# Patient Record
Sex: FEMALE | Race: WHITE
[De-identification: names, ages, dates, MRNs, and addresses within clinical notes are randomized per-mention and may not be internally consistent; named-entity substitution may affect disease eponyms.]

---

## 2017-07-17 ENCOUNTER — HOSPITAL ENCOUNTER (EMERGENCY)
Dept: HOSPITAL 56 - MW.ED | Age: 82
Discharge: HOME | End: 2017-07-17
Payer: MEDICARE

## 2017-07-17 VITALS — SYSTOLIC BLOOD PRESSURE: 146 MMHG | DIASTOLIC BLOOD PRESSURE: 74 MMHG

## 2017-07-17 DIAGNOSIS — Z90.710: ICD-10-CM

## 2017-07-17 DIAGNOSIS — Z88.2: ICD-10-CM

## 2017-07-17 DIAGNOSIS — Z96.659: ICD-10-CM

## 2017-07-17 DIAGNOSIS — K21.9: ICD-10-CM

## 2017-07-17 DIAGNOSIS — Z91.040: ICD-10-CM

## 2017-07-17 DIAGNOSIS — E03.9: ICD-10-CM

## 2017-07-17 DIAGNOSIS — L03.115: Primary | ICD-10-CM

## 2017-07-17 DIAGNOSIS — Z88.5: ICD-10-CM

## 2017-07-17 DIAGNOSIS — Z98.49: ICD-10-CM

## 2017-07-17 DIAGNOSIS — Z88.0: ICD-10-CM

## 2017-07-17 DIAGNOSIS — Z87.891: ICD-10-CM

## 2017-07-17 DIAGNOSIS — Z88.6: ICD-10-CM

## 2017-07-17 DIAGNOSIS — Z79.899: ICD-10-CM

## 2017-07-17 LAB
CHLORIDE SERPL-SCNC: 111 MMOL/L (ref 98–110)
SODIUM SERPL-SCNC: 140 MMOL/L (ref 136–146)

## 2017-07-17 PROCEDURE — 86140 C-REACTIVE PROTEIN: CPT

## 2017-07-17 PROCEDURE — 85025 COMPLETE CBC W/AUTO DIFF WBC: CPT

## 2017-07-17 PROCEDURE — 96375 TX/PRO/DX INJ NEW DRUG ADDON: CPT

## 2017-07-17 PROCEDURE — 36415 COLL VENOUS BLD VENIPUNCTURE: CPT

## 2017-07-17 PROCEDURE — 80053 COMPREHEN METABOLIC PANEL: CPT

## 2017-07-17 PROCEDURE — 87040 BLOOD CULTURE FOR BACTERIA: CPT

## 2017-07-17 PROCEDURE — 96365 THER/PROPH/DIAG IV INF INIT: CPT

## 2017-07-17 PROCEDURE — 73562 X-RAY EXAM OF KNEE 3: CPT

## 2017-07-17 PROCEDURE — 99284 EMERGENCY DEPT VISIT MOD MDM: CPT

## 2017-07-17 PROCEDURE — 85652 RBC SED RATE AUTOMATED: CPT

## 2017-07-17 NOTE — EDM.PDOC
ED HPI GENERAL MEDICAL PROBLEM





- General


Chief Complaint: Lower Extremity Injury/Pain


Stated Complaint: RIGHT KNEE PAIN


Time Seen by Provider: 07/17/17 06:59


Source of Information: Reports: Patient





- History of Present Illness


INITIAL COMMENTS - FREE TEXT/NARRATIVE: 


History of present illness:


[]Patient has a knee replacement on her right knee and started having 

increasing pain and redness and swelling 3 days ago. She states she has chills 

but denies any fevers or recent trauma to the knee. Patient did have a wound 

infection after the knee replacement and has been going to wound care at [facility].





Review of systems: 


As per history of present illness and below otherwise all systems reviewed and 

negative.





Past medical history: 


As per history of present illness and as reviewed below otherwise 

noncontributory.





Surgical history: 


As per history of present illness and as reviewed below otherwise 

noncontributory.





Social history: 


No reported history of drug or alcohol abuse.





Family history: 


As per history of present illness and as reviewed below otherwise 

noncontributory.





Physical exam:


General: Well developed, well nourished in NAD


HEENT: Atraumatic, normocephalic, pupils reactive, negative for conjunctival 

pallor or scleral icterus, mucous membranes moist, throat clear, neck supple, 

nontender, trachea midline.


Lungs: Clear to auscultation, breath sounds equal bilaterally, chest nontender.


Heart: S1S2, regular, negative for clicks, rubs, or JVD.


Abdomen: Soft, nondistended, nontender. Negative for masses or 

hepatosplenomegaly. Negative for costovertebral tenderness.


Pelvis: Stable nontender.


Genitourinary: Deferred.


Rectal: Deferred.


Extremities: Atraumatic, right lateral knee with erythema, warmth and 

tenderness to palpation there is no wound drainage from chronic wound on the 

lower medial knee, no effusion, no distal edema negative for cords or calf 

pain. Neurovascular unremarkable.


Neuro: Awake, alert, oriented. Cranial nerves II through XII unremarkable. 

Cerebellum unremarkable. Motor and sensory unremarkable throughout. Exam 

nonfocal.





Diagnostics:


[]X-ray negative, CBC with elevated white count with a shift





Therapeutics:


[]Pain meds and IV antibiotics started





Impression: 


[]Cellulitis over a prosthetic knee





Plan:


[]I consulted Dr. Pope who sent her PA for evaluation. Follow-up with 

orthopedics, Levaquin daily for 10 days, Toradol and Zofran for pain and nausea 

as needed.





Definitive disposition and diagnosis as appropriate pending reevaluation and 

review of above.





  ** Right Knee


Pain Score (Numeric/FACES): 10





- Related Data


 Allergies











Allergy/AdvReac Type Severity Reaction Status Date / Time


 


codeine Allergy  Vomiting Verified 08/27/16 13:55


 


latex Allergy  Rash Verified 08/29/16 11:51


 


morphine Allergy  Difficulty Verified 08/27/16 13:55





   Breathing  


 


oxycodone HCl [From Percocet] Allergy  Vomiting Verified 08/27/16 13:55


 


Penicillins Allergy  Anaphylactic Verified 08/27/16 13:55





   Shock  


 


Sulfa (Sulfonamide Allergy  Anaphylactic Verified 08/27/16 13:55





Antibiotics)   Shock  


 


all pain meds Allergy  Nausea Uncoded 07/17/17 07:03











Home Meds: 


 Home Meds





amLODIPine Besylate [Amlodipine Besylate] 10 mg PO DAILY 12/26/14 [History]


Omeprazole Magnesium [Prilosec Otc] 20 mg PO ACBRK 12/30/14 [History]


Promethazine [Phenergan] 25 mg PO Q6H PRN #30 tablet 01/02/15 [Rx]


Denosumab [Prolia] 1 dose IM ASDIRECTED 07/28/16 [History]


Levothyroxine [Synthroid] 1 tab PO DAILY 07/28/16 [History]


Ketorolac [Toradol] 10 mg PO Q6H PRN #20 tablet 07/17/17 [Rx]


Levofloxacin [Levaquin] 750 mg PO DAILY #9 tablet 07/17/17 [Rx]


Ondansetron HCl [Zofran] 4 mg PO Q8HR PRN #16 tablet 07/17/17 [Rx]











Past Medical History





- Past Health History


Medical/Surgical History: Denies Medical/Surgical History


HEENT History: Reports: Cataract


Other HEENT History: has upper denture and lower partial


Cardiovascular History: Reports: None


Respiratory History: Reports: None


Gastrointestinal History: Reports: GERD, Irritable Bowel Syndrome, Other (See 

Below)


Other Gastrointestinal History: IBS-Diarrhea


Genitourinary History: Reports: Other (See Below)


Other Genitourinary History: overactive bladder


OB/GYN History: Reports: Pregnancy


Musculoskeletal History: Reports: Arthritis


Neurological History: Reports: Other (See Below)


Other Neuro History: hx of motion sickness


Psychiatric History: Reports: None


Endocrine/Metabolic History: Reports: Hypothyroidism


Hematologic History: Reports: None


Immunologic History: Reports: Other (See Below)


Other Immunologic History: CREST syndrome


Oncologic (Cancer) History: Reports: None


Dermatologic History: Reports: None, Other (See Below)


Other Dermatologic History: Raynaud's Syndrome





- Past Surgical History


HEENT Surgical History: Reports: Cataract Surgery


Female  Surgical History: Reports: Hysterectomy, Other (See Below)


Musculoskeletal Surgical History: Reports: Knee Replacement, ORIF, Shoulder 

Surgery, Other (See Below)





Social & Family History





- Family History


Family Medical History: Noncontributory


HEENT: Reports: Cataract


Respiratory: Reports: COPD


Musculoskeletal: Reports: Arthritis


Psychiatric: Reports: Depression


Endocrine/Metabolic: Reports: Diabetes, type II





- Tobacco Use


Smoking Status *Q: Former Smoker


Years of Tobacco use: 10


Used Tobacco, but Quit: No


Second Hand Smoke Exposure: No





- Alcohol Use


Days Per Week of Alcohol Use: 0


Number of Drinks Per Day: 0


Total Drinks Per Week: 0





- Recreational Drug Use


Recreational Drug Use: No


Drug Use in Last 12 Months: No





Review of Systems





- Review of Systems


Review Of Systems: See Below (See history of present illness)





ED EXAM, GENERAL





- Physical Exam


Exam: See Below (See history of present illness)





Course





- Vital Signs


Last Recorded V/S: 


 Last Vital Signs











Temp  37.1 C   07/17/17 07:03


 


Pulse  88   07/17/17 10:30


 


Resp  16   07/17/17 10:30


 


BP  122/54 L  07/17/17 10:30


 


Pulse Ox  90 L  07/17/17 10:30














- Orders/Labs/Meds


Orders: 


 Active Orders 24 hr











 Category Date Time Status


 


 CULTURE BLOOD [BC] Stat Lab  07/17/17 07:25 Received


 


 CULTURE BLOOD [BC] Stat Lab  07/17/17 07:39 Received


 


 Sodium Chloride 0.9% [Saline Flush] Med  07/17/17 07:14 Active





 10 ml FLUSH ASDIRECTED PRN   


 


 Sodium Chloride 0.9% [Saline Flush] Med  07/17/17 07:14 Active





 2.5 ml FLUSH ASDIRECTED PRN   


 


 Blood Culture x2 Reflex Set [OM.PC] Stat Oth  07/17/17 07:13 Ordered


 


 Saline Lock Insert [OM.PC] Stat Oth  07/17/17 07:14 Ordered








 Medication Orders





Sodium Chloride (Saline Flush)  10 ml FLUSH ASDIRECTED PRN


   PRN Reason: Keep Vein Open


Sodium Chloride (Saline Flush)  2.5 ml FLUSH ASDIRECTED PRN


   PRN Reason: Keep Vein Open








Labs: 


 Laboratory Tests











  07/17/17 07/17/17 07/17/17 Range/Units





  07:25 07:25 07:25 


 


WBC  11.54 H    (4.0-11.0)  K/uL


 


RBC  4.03 L    (4.30-5.90)  M/uL


 


Hgb  11.5 L    (12.0-16.0)  g/dL


 


Hct  36.6    (36.0-46.0)  %


 


MCV  90.8    (80.0-98.0)  fL


 


MCH  28.5    (27.0-32.0)  pg


 


MCHC  31.4    (31.0-37.0)  g/dL


 


RDW Std Deviation  50.3    (28.0-62.0)  fl


 


RDW Coeff of Rai  15    (11.0-15.0)  %


 


Plt Count  189    (150-400)  K/uL


 


MPV  10.80    (7.40-12.00)  fL


 


Neut % (Auto)  83.2 H    (48.0-80.0)  %


 


Lymph % (Auto)  9.3 L    (16.0-40.0)  %


 


Mono % (Auto)  7.0    (0.0-15.0)  %


 


Eos % (Auto)  0.3    (0.0-7.0)  %


 


Baso % (Auto)  0.2    (0.0-1.5)  %


 


Neut # (Auto)  9.6 H    (1.4-5.7)  K/uL


 


Lymph # (Auto)  1.1    (0.6-2.4)  K/uL


 


Mono # (Auto)  0.8    (0.0-0.8)  K/uL


 


Eos # (Auto)  0.0    (0.0-0.7)  K/uL


 


Baso # (Auto)  0.0    (0.0-0.1)  K/uL


 


Nucleated RBC %  0.0    /100WBC


 


Nucleated RBCs #  0    K/uL


 


ESR    44 H  (0-29)  mm/hr


 


Sodium   140   (136-146)  mmol/L


 


Potassium   3.7   (3.5-5.1)  mmol/L


 


Chloride   111 H   ()  mmol/L


 


Carbon Dioxide   23   (21-31)  mmol/L


 


BUN   13   (6.0-23.0)  mg/dL


 


Creatinine   0.8   (0.6-1.5)  mg/dL


 


Est Cr Clr Drug Dosing   44.36   mL/min


 


Estimated GFR (MDRD)   > 60.0   ml/min


 


Glucose   101   ()  mg/dL


 


Calcium   8.5 L   (8.8-10.8)  mg/dL


 


Total Bilirubin   0.5   (0.1-1.5)  mg/dL


 


AST   13   (5-40)  IU/L


 


ALT   8   (8-54)  IU/L


 


Alkaline Phosphatase   84   ()  


 


C-Reactive Protein     (0.0-0.5)  mg/dL


 


Total Protein   7.1   (6.0-8.0)  g/dL


 


Albumin   3.6   (3.4-4.8)  g/dL


 


Globulin   3.5   (2.0-3.5)  g/dL


 


Albumin/Globulin Ratio   1.0 L   (1.3-2.8)  














  07/17/17 Range/Units





  07:25 


 


WBC   (4.0-11.0)  K/uL


 


RBC   (4.30-5.90)  M/uL


 


Hgb   (12.0-16.0)  g/dL


 


Hct   (36.0-46.0)  %


 


MCV   (80.0-98.0)  fL


 


MCH   (27.0-32.0)  pg


 


MCHC   (31.0-37.0)  g/dL


 


RDW Std Deviation   (28.0-62.0)  fl


 


RDW Coeff of Rai   (11.0-15.0)  %


 


Plt Count   (150-400)  K/uL


 


MPV   (7.40-12.00)  fL


 


Neut % (Auto)   (48.0-80.0)  %


 


Lymph % (Auto)   (16.0-40.0)  %


 


Mono % (Auto)   (0.0-15.0)  %


 


Eos % (Auto)   (0.0-7.0)  %


 


Baso % (Auto)   (0.0-1.5)  %


 


Neut # (Auto)   (1.4-5.7)  K/uL


 


Lymph # (Auto)   (0.6-2.4)  K/uL


 


Mono # (Auto)   (0.0-0.8)  K/uL


 


Eos # (Auto)   (0.0-0.7)  K/uL


 


Baso # (Auto)   (0.0-0.1)  K/uL


 


Nucleated RBC %   /100WBC


 


Nucleated RBCs #   K/uL


 


ESR   (0-29)  mm/hr


 


Sodium   (136-146)  mmol/L


 


Potassium   (3.5-5.1)  mmol/L


 


Chloride   ()  mmol/L


 


Carbon Dioxide   (21-31)  mmol/L


 


BUN   (6.0-23.0)  mg/dL


 


Creatinine   (0.6-1.5)  mg/dL


 


Est Cr Clr Drug Dosing   mL/min


 


Estimated GFR (MDRD)   ml/min


 


Glucose   ()  mg/dL


 


Calcium   (8.8-10.8)  mg/dL


 


Total Bilirubin   (0.1-1.5)  mg/dL


 


AST   (5-40)  IU/L


 


ALT   (8-54)  IU/L


 


Alkaline Phosphatase   ()  


 


C-Reactive Protein  18.89 H  (0.0-0.5)  mg/dL


 


Total Protein   (6.0-8.0)  g/dL


 


Albumin   (3.4-4.8)  g/dL


 


Globulin   (2.0-3.5)  g/dL


 


Albumin/Globulin Ratio   (1.3-2.8)  











Meds: 


Medications











Generic Name Dose Route Start Last Admin





  Trade Name Freq  PRN Reason Stop Dose Admin


 


Sodium Chloride  10 ml  07/17/17 07:14  





  Saline Flush  FLUSH   





  ASDIRECTED PRN   





  Keep Vein Open   


 


Sodium Chloride  2.5 ml  07/17/17 07:14  





  Saline Flush  FLUSH   





  ASDIRECTED PRN   





  Keep Vein Open   














Discontinued Medications














Generic Name Dose Route Start Last Admin





  Trade Name Freq  PRN Reason Stop Dose Admin


 


Levofloxacin/Dextrose 750 mg/  150 mls @ 100 mls/hr  07/17/17 10:02  07/17/17 10

:36





  Premix  IV  07/17/17 11:31  100 mls/hr





  ONETIME ONE   Administration


 


Ketorolac Tromethamine  15 mg  07/17/17 08:02  07/17/17 08:09





  Toradol  IVPUSH  07/17/17 08:03  15 mg





  ONETIME ONE   Administration


 


Ondansetron HCl  4 mg  07/17/17 08:03  07/17/17 08:09





  Zofran  IVPUSH  07/17/17 08:04  4 mg





  ONETIME ONE   Administration














Departure





- Departure


Time of Disposition: 12:10


Disposition: Home, Self-Care 01


Condition: Good


Clinical Impression: 


 Cellulitis of knee, right








- Discharge Information


Prescriptions: 


Ondansetron HCl [Zofran] 4 mg PO Q8HR PRN #16 tablet


 PRN Reason: Nausea


Ketorolac [Toradol] 10 mg PO Q6H PRN #20 tablet


 PRN Reason: Pain


Levofloxacin [Levaquin] 750 mg PO DAILY #9 tablet


Instructions:  Cellulitis, Adult, Easy-to-Read


Referrals: 


PCP,None [Primary Care Provider] - 


Forms:  ED Department Discharge


Additional Instructions: 


The following information is given to patients seen in the emergency department 

who are being discharged to home. This information is to outline your options 

for follow-up care. We provide all patients seen in our emergency department 

with a follow-up referral.





The need for follow-up, as well as the timing and circumstances, are variable 

depending upon the specifics of your emergency department visit.





If you don't have a primary care physician on staff, we will provide you with a 

referral. We always advise you to contact your personal physician following an 

emergency department visit to inform them of the circumstance of the visit and 

for follow-up with them and/or the need for any referrals to a consulting 

specialist.





The emergency department will also refer you to a specialist when appropriate. 

This referral assures that you have the opportunity for follow-up care with a 

specialist. All of these measure are taken in an effort to provide you with 

optimal care, which includes your follow-up.





Under all circumstances we always encourage you to contact your private 

physician who remains a resource for coordinating your care. When calling for 

follow-up care, please make the office aware that this follow-up is from your 

recent emergency room visit. If for any reason you are refused follow-up, 

please contact the [facility] Emergency 

Department at [phone] and asked to speak to the emergency department 

charge nurse.








Levaquin daily for 10 days, tramadol and Zofran for pain and nausea





Follow-up with Dr. Niko [facility]


Specialty [facility] - [facility]


[address], [address]


[place] [address]


Phone: [phone]


Fax: [phone]





- My Orders


Last 24 Hours: 


My Active Orders





07/17/17 07:13


Blood Culture x2 Reflex Set [OM.PC] Stat 





07/17/17 07:14


Sodium Chloride 0.9% [Saline Flush]   10 ml FLUSH ASDIRECTED PRN 


Sodium Chloride 0.9% [Saline Flush]   2.5 ml FLUSH ASDIRECTED PRN 


Saline Lock Insert [OM.PC] Stat 





07/17/17 07:25


CULTURE BLOOD [BC] Stat 





07/17/17 07:39


CULTURE BLOOD [BC] Stat 














- Assessment/Plan


Last 24 Hours: 


My Active Orders





07/17/17 07:13


Blood Culture x2 Reflex Set [OM.PC] Stat 





07/17/17 07:14


Sodium Chloride 0.9% [Saline Flush]   10 ml FLUSH ASDIRECTED PRN 


Sodium Chloride 0.9% [Saline Flush]   2.5 ml FLUSH ASDIRECTED PRN 


Saline Lock Insert [OM.PC] Stat 





07/17/17 07:25


CULTURE BLOOD [BC] Stat 





07/17/17 07:39


CULTURE BLOOD [BC] Stat

## 2017-07-17 NOTE — CR
EXAMINATION: Right knee

 

HISTORY: Pain

 

COMPARISON: CT dated 8/30/2016

 

TECHNIQUE: 3 views

 

FINDINGS: There is no acute osseous abnormality, dislocation, or fracture identified. Right revision
 arthroplasty hardware noted in stable position and alignment. Postoperative soft tissue changes not
ed within the right knee without evidence of a joint effusion. Bone mineralization is otherwise norm
al.

 

IMPRESSION: Right total knee hardware without an acute osseous abnormality.

## 2017-07-17 NOTE — PCM.CONS
<Mando Marmolejo - Last Filed: 07/17/17 10:08>





H&P History of Present Illness





- General


Date of Service: 07/17/17


Source of Information: Patient, Family


History Limitations: Reports: No Limitations





- History of Present Illness


Onset of Symptoms: Reports: Gradual


Duration of Symptoms: Reports: Day(s): (4)


Location: Reports: Lower Extremity, Right


Quality: Reports: Sharp, Stabbing, Throbbing


Improves with: Reports: Medication (Toradol)


Worsens with: Reports: Movement


Associated Symptoms: Reports: No Other Symptoms.  Denies: Fever/Chills


  ** Right Knee


Pain Score (Numeric/FACES): 10





- Related Data


Allergies/Adverse Reactions: 


 Allergies











Allergy/AdvReac Type Severity Reaction Status Date / Time


 


codeine Allergy  Vomiting Verified 08/27/16 13:55


 


latex Allergy  Rash Verified 08/29/16 11:51


 


morphine Allergy  Difficulty Verified 08/27/16 13:55





   Breathing  


 


oxycodone HCl [From Percocet] Allergy  Vomiting Verified 08/27/16 13:55


 


Penicillins Allergy  Anaphylactic Verified 08/27/16 13:55





   Shock  


 


Sulfa (Sulfonamide Allergy  Anaphylactic Verified 08/27/16 13:55





Antibiotics)   Shock  


 


all pain meds Allergy  Nausea Uncoded 07/17/17 07:03











Home Medications: 


 Home Meds





amLODIPine Besylate [Amlodipine Besylate] 10 mg PO DAILY 12/26/14 [History]


Omeprazole Magnesium [Prilosec Otc] 20 mg PO ACBRK 12/30/14 [History]


Promethazine [Phenergan] 25 mg PO Q6H PRN #30 tablet 01/02/15 [Rx]


Denosumab [Prolia] 1 dose IM ASDIRECTED 07/28/16 [History]


Levothyroxine [Synthroid] 1 tab PO DAILY 07/28/16 [History]


Ketorolac [Toradol] 10 mg PO Q6H PRN #20 tablet 07/17/17 [Rx]


Levofloxacin [Levaquin] 750 mg PO DAILY #9 tablet 07/17/17 [Rx]


Ondansetron HCl [Zofran] 4 mg PO Q8HR PRN #16 tablet 07/17/17 [Rx]











Past Medical History





- Past Health History


Medical/Surgical History: Denies Medical/Surgical History


HEENT History: Reports: Cataract


Other HEENT History: has upper denture and lower partial


Cardiovascular History: Reports: None


Respiratory History: Reports: None


Gastrointestinal History: Reports: GERD, Irritable Bowel Syndrome, Other (See 

Below)


Other Gastrointestinal History: IBS-Diarrhea


Genitourinary History: Reports: Other (See Below)


Other Genitourinary History: overactive bladder


OB/GYN History: Reports: Pregnancy


Musculoskeletal History: Reports: Arthritis


Neurological History: Reports: Other (See Below)


Other Neuro History: hx of motion sickness


Psychiatric History: Reports: None


Endocrine/Metabolic History: Reports: Hypothyroidism


Hematologic History: Reports: None


Immunologic History: Reports: Other (See Below)


Other Immunologic History: CREST syndrome


Oncologic (Cancer) History: Reports: None


Dermatologic History: Reports: None, Other (See Below)


Other Dermatologic History: Raynaud's Syndrome





- Infectious Disease History


Infectious Disease History: Reports: Chicken Pox, Measles, Shingles





- Past Surgical History


HEENT Surgical History: Reports: Cataract Surgery


Female  Surgical History: Reports: Hysterectomy, Other (See Below)


Musculoskeletal Surgical History: Reports: Knee Replacement, ORIF, Shoulder 

Surgery, Other (See Below)





Social & Family History





- Family History


Family Medical History: Noncontributory


HEENT: Reports: Cataract


Respiratory: Reports: COPD


Musculoskeletal: Reports: Arthritis


Psychiatric: Reports: Depression


Endocrine/Metabolic: Reports: Diabetes, type II





- Tobacco Use


Smoking Status *Q: Former Smoker


Years of Tobacco use: 10


Used Tobacco, but Quit: No


Second Hand Smoke Exposure: No





- Caffeine Use


Caffeine Use: Reports: Coffee, Soda





- Alcohol Use


Days Per Week of Alcohol Use: 0


Number of Drinks Per Day: 0


Total Drinks Per Week: 0





- Recreational Drug Use


Recreational Drug Use: No


Drug Use in Last 12 Months: No





H&P Review of Systems





- Review of Systems:


Review Of Systems: See Below


General: Denies: Fever, Chills, Malaise, Weakness, Fatigue


HEENT: Reports: No Symptoms


Pulmonary: Reports: No Symptoms


Cardiovascular: Reports: No Symptoms


Gastrointestinal: Reports: No Symptoms


Genitourinary: Reports: No Symptoms


Musculoskeletal: Reports: Leg Pain, Joint Pain, Joint Swelling


Skin: Reports: Erythema (Medial aspect of right knee.)


Psychiatric: Reports: No Symptoms


Neurological: Reports: No Symptoms


Hematologic/Lymphatic: Reports: No Symptoms


Immunologic: Reports: No Symptoms





Exam





- Exam


Exam: See Below





- Vital Signs


Vital Signs: 


 Last Vital Signs











Temp  37.1 C   07/17/17 07:03


 


Pulse  110 H  07/17/17 07:03


 


Resp  18   07/17/17 07:03


 


BP  158/76 H  07/17/17 07:03


 


Pulse Ox  97   07/17/17 07:03











Weight: 50.349 kg





- Exam


General: Alert, Oriented


HEENT: Conjunctiva Clear, Hearing Intact, Pupils Equal, Pupils Reactive


Neck: Trachea Midline


Lungs: Normal Respiratory Effort


Extremities: Normal Pulses, Other (Exam of right knee reveals no obvious 

deformities. Midline incision is clean, dry and well healed. There is an area 

approximately 3cm in diamater on the inferior medial aspect of the knee. This 

appears to be healing. It is dry, no drainage. There is erythema surrounding 

this. Joint is warm with palpation. Pain with palpation of lateral joint line. 

ROM 0-90 degrees. Stable with varus and valgus stress. EHL, gastroc, anterior 

tibialis strength 5/5. ).  No: Calf Tenderness


Skin: Warm, Dry, Intact


Neuro Extensive - Mental Status: Alert, Oriented x3, Normal Mood/Affect, Memory 

Intact


Neuro Extensive - Motor, Sensory, Reflexes: CN II-XII Intact





- Patient Data


Lab Results Last 24 hrs: 


 Laboratory Results - last 24 hr











  07/17/17 07/17/17 Range/Units





  07:25 07:25 


 


WBC  11.54 H   (4.0-11.0)  K/uL


 


RBC  4.03 L   (4.30-5.90)  M/uL


 


Hgb  11.5 L   (12.0-16.0)  g/dL


 


Hct  36.6   (36.0-46.0)  %


 


MCV  90.8   (80.0-98.0)  fL


 


MCH  28.5   (27.0-32.0)  pg


 


MCHC  31.4   (31.0-37.0)  g/dL


 


RDW Std Deviation  50.3   (28.0-62.0)  fl


 


RDW Coeff of Rai  15   (11.0-15.0)  %


 


Plt Count  189   (150-400)  K/uL


 


MPV  10.80   (7.40-12.00)  fL


 


Neut % (Auto)  83.2 H   (48.0-80.0)  %


 


Lymph % (Auto)  9.3 L   (16.0-40.0)  %


 


Mono % (Auto)  7.0   (0.0-15.0)  %


 


Eos % (Auto)  0.3   (0.0-7.0)  %


 


Baso % (Auto)  0.2   (0.0-1.5)  %


 


Neut # (Auto)  9.6 H   (1.4-5.7)  K/uL


 


Lymph # (Auto)  1.1   (0.6-2.4)  K/uL


 


Mono # (Auto)  0.8   (0.0-0.8)  K/uL


 


Eos # (Auto)  0.0   (0.0-0.7)  K/uL


 


Baso # (Auto)  0.0   (0.0-0.1)  K/uL


 


Nucleated RBC %  0.0   /100WBC


 


Nucleated RBCs #  0   K/uL


 


Sodium   140  (136-146)  mmol/L


 


Potassium   3.7  (3.5-5.1)  mmol/L


 


Chloride   111 H  ()  mmol/L


 


Carbon Dioxide   23  (21-31)  mmol/L


 


BUN   13  (6.0-23.0)  mg/dL


 


Creatinine   0.8  (0.6-1.5)  mg/dL


 


Est Cr Clr Drug Dosing   44.36  mL/min


 


Estimated GFR (MDRD)   > 60.0  ml/min


 


Glucose   101  ()  mg/dL


 


Calcium   8.5 L  (8.8-10.8)  mg/dL


 


Total Bilirubin   0.5  (0.1-1.5)  mg/dL


 


AST   13  (5-40)  IU/L


 


ALT   8  (8-54)  IU/L


 


Alkaline Phosphatase   84  ()  


 


Total Protein   7.1  (6.0-8.0)  g/dL


 


Albumin   3.6  (3.4-4.8)  g/dL


 


Globulin   3.5  (2.0-3.5)  g/dL


 


Albumin/Globulin Ratio   1.0 L  (1.3-2.8)  











Result Diagrams: 


 07/17/17 07:25





 07/17/17 07:25





Consult PN Assessment/Plan


Procedures: 


Procedures





BLOOD CULTURE FOR BACTERIA (08/27/16)


BLOOD TYPING SEROLOGIC ABO (12/29/14)


BLOOD TYPING SEROLOGIC RH(D) (12/29/14)


C-REACTIVE PROTEIN (08/29/16)


COMPATIBILITY TEST ANTIGLOB (12/29/14)


COMPATIBILITY TEST INCUBATE (12/29/14)


COMPATIBILITY TEST SPIN (12/29/14)


COMPLETE CBC AUTOMATED (04/29/16)


COMPLETE CBC W/AUTO DIFF WBC (08/29/16)


COMPREHEN METABOLIC PANEL (08/27/16)


CT LOWER EXTREMITY W/DYE (03/29/17)


CT LOWER EXTREMITY W/O DYE (10/14/14)


CULTR BACTERIA EXCEPT BLOOD (08/27/16)


CULTURE OTHR SPECIMN AEROBIC (08/27/16)


MAGGIE MUSC/FASCIA 20 SQ CM/< (07/29/16)


MAGGIE SUBQ TISSUE 20 SQ CM/< (04/29/16)


DECALCIFY TISSUE (12/29/14)


ELECTROCARDIOGRAM TRACING (04/29/16)


EMERGENCY DEPT VISIT (08/27/16)


EXTREMITY STUDY (08/31/16)


FLUOROSCOPE EXAMINATION (11/21/14)


GAIT TRAINING THERAPY (03/27/15)


HEMATOCRIT (12/29/14)


HEMOGLOBIN (12/29/14)


HOT OR COLD PACKS THERAPY (03/27/15)


LEG SURGERY PROCEDURE (12/29/14)


METABOLIC PANEL TOTAL CA (04/29/16)


MRI JNT OF LWR EXTRE W/O DYE (11/22/14)


OFFICE/OUTPATIENT VISIT EST (08/29/16)


OFFICE/OUTPATIENT VISIT EST (03/31/15)


PT EVALUATION (01/30/15)


RBC ANTIBODY SCREEN (12/29/14)


RBC SED RATE AUTOMATED (08/29/16)


REMOVAL OF SUPPORT IMPLANT (11/21/14)


ROUTINE VENIPUNCTURE (08/29/16)


SMEAR GRAM STAIN (07/29/16)


THER/PROPH/DIAG INJ IV PUSH (08/27/16)


THER/PROPH/DIAG INJ SC/IM (08/27/16)


THERAPEUTIC EXERCISES (03/27/15)


TISSUE EXAM BY PATHOLOGIST (12/29/14)


TOTAL KNEE ARTHROPLASTY (12/29/14)


TX/PRO/DX INJ NEW DRUG ADDON (08/27/16)


URINALYSIS AUTO W/SCOPE (12/29/14)


URINE BACTERIA CULTURE (12/29/14)


VASOPNEUMATIC DEVICE THERAPY (02/26/15)


X-RAY EXAM KNEE 4 OR MORE (07/28/16)


X-RAY EXAM OF ANKLE (11/20/14)


X-RAY EXAM OF FOOT (04/07/14)


X-RAY EXAM OF KNEE 1 OR 2 (08/29/16)


X-RAY EXAM OF KNEE 3 (11/24/15)


X-RAY EXAM OF LOWER LEG (08/27/16)








Problem List Initiated/Reviewed/Updated: Yes


Plan: 





ESR and CRP ordered.


D/C home on Levaquin 750 mg for 10 days and Toradol 10 mg for 5 days.


Activity as tolerated. May use walker if needed.


Followup in the clinic on Thursday 07/20 1:30 pm with Mando Marmolejo PA-C.


Questions or concerns prior to followup, call the ortho clinic [phone].











<Mary Carmen Pope R - Last Filed: 07/17/17 12:18>





H&P History of Present Illness





- History of Present Illness


Initial Comments - Free Text/Narative: 





Pt has h/o TKA ~1.5 years ago. Postoperative course complicated by persistent 

wound along medial aspect of knee. She has been followed by wound nurse at 

[facility] for this. Denies new injury. Denies fever/chills. Noted increased pain 

over past few days. Pain improved with Toradol. She has h/o CREST syndrome.





Exam





- Vital Signs


Vital Signs: 


 Last Vital Signs











Temp  98.7 F   07/17/17 07:03


 


Pulse  88   07/17/17 10:30


 


Resp  16   07/17/17 10:30


 


BP  122/54 L  07/17/17 10:30


 


Pulse Ox  90 L  07/17/17 10:30














- Patient Data


Lab Results Last 24 hrs: 


 Laboratory Results - last 24 hr











  07/17/17 07/17/17 07/17/17 Range/Units





  07:25 07:25 07:25 


 


WBC  11.54 H    (4.0-11.0)  K/uL


 


RBC  4.03 L    (4.30-5.90)  M/uL


 


Hgb  11.5 L    (12.0-16.0)  g/dL


 


Hct  36.6    (36.0-46.0)  %


 


MCV  90.8    (80.0-98.0)  fL


 


MCH  28.5    (27.0-32.0)  pg


 


MCHC  31.4    (31.0-37.0)  g/dL


 


RDW Std Deviation  50.3    (28.0-62.0)  fl


 


RDW Coeff of Rai  15    (11.0-15.0)  %


 


Plt Count  189    (150-400)  K/uL


 


MPV  10.80    (7.40-12.00)  fL


 


Neut % (Auto)  83.2 H    (48.0-80.0)  %


 


Lymph % (Auto)  9.3 L    (16.0-40.0)  %


 


Mono % (Auto)  7.0    (0.0-15.0)  %


 


Eos % (Auto)  0.3    (0.0-7.0)  %


 


Baso % (Auto)  0.2    (0.0-1.5)  %


 


Neut # (Auto)  9.6 H    (1.4-5.7)  K/uL


 


Lymph # (Auto)  1.1    (0.6-2.4)  K/uL


 


Mono # (Auto)  0.8    (0.0-0.8)  K/uL


 


Eos # (Auto)  0.0    (0.0-0.7)  K/uL


 


Baso # (Auto)  0.0    (0.0-0.1)  K/uL


 


Nucleated RBC %  0.0    /100WBC


 


Nucleated RBCs #  0    K/uL


 


ESR    44 H  (0-29)  mm/hr


 


Sodium   140   (136-146)  mmol/L


 


Potassium   3.7   (3.5-5.1)  mmol/L


 


Chloride   111 H   ()  mmol/L


 


Carbon Dioxide   23   (21-31)  mmol/L


 


BUN   13   (6.0-23.0)  mg/dL


 


Creatinine   0.8   (0.6-1.5)  mg/dL


 


Est Cr Clr Drug Dosing   44.36   mL/min


 


Estimated GFR (MDRD)   > 60.0   ml/min


 


Glucose   101   ()  mg/dL


 


Calcium   8.5 L   (8.8-10.8)  mg/dL


 


Total Bilirubin   0.5   (0.1-1.5)  mg/dL


 


AST   13   (5-40)  IU/L


 


ALT   8   (8-54)  IU/L


 


Alkaline Phosphatase   84   ()  


 


C-Reactive Protein     (0.0-0.5)  mg/dL


 


Total Protein   7.1   (6.0-8.0)  g/dL


 


Albumin   3.6   (3.4-4.8)  g/dL


 


Globulin   3.5   (2.0-3.5)  g/dL


 


Albumin/Globulin Ratio   1.0 L   (1.3-2.8)  














  07/17/17 Range/Units





  07:25 


 


WBC   (4.0-11.0)  K/uL


 


RBC   (4.30-5.90)  M/uL


 


Hgb   (12.0-16.0)  g/dL


 


Hct   (36.0-46.0)  %


 


MCV   (80.0-98.0)  fL


 


MCH   (27.0-32.0)  pg


 


MCHC   (31.0-37.0)  g/dL


 


RDW Std Deviation   (28.0-62.0)  fl


 


RDW Coeff of Rai   (11.0-15.0)  %


 


Plt Count   (150-400)  K/uL


 


MPV   (7.40-12.00)  fL


 


Neut % (Auto)   (48.0-80.0)  %


 


Lymph % (Auto)   (16.0-40.0)  %


 


Mono % (Auto)   (0.0-15.0)  %


 


Eos % (Auto)   (0.0-7.0)  %


 


Baso % (Auto)   (0.0-1.5)  %


 


Neut # (Auto)   (1.4-5.7)  K/uL


 


Lymph # (Auto)   (0.6-2.4)  K/uL


 


Mono # (Auto)   (0.0-0.8)  K/uL


 


Eos # (Auto)   (0.0-0.7)  K/uL


 


Baso # (Auto)   (0.0-0.1)  K/uL


 


Nucleated RBC %   /100WBC


 


Nucleated RBCs #   K/uL


 


ESR   (0-29)  mm/hr


 


Sodium   (136-146)  mmol/L


 


Potassium   (3.5-5.1)  mmol/L


 


Chloride   ()  mmol/L


 


Carbon Dioxide   (21-31)  mmol/L


 


BUN   (6.0-23.0)  mg/dL


 


Creatinine   (0.6-1.5)  mg/dL


 


Est Cr Clr Drug Dosing   mL/min


 


Estimated GFR (MDRD)   ml/min


 


Glucose   ()  mg/dL


 


Calcium   (8.8-10.8)  mg/dL


 


Total Bilirubin   (0.1-1.5)  mg/dL


 


AST   (5-40)  IU/L


 


ALT   (8-54)  IU/L


 


Alkaline Phosphatase   ()  


 


C-Reactive Protein  18.89 H  (0.0-0.5)  mg/dL


 


Total Protein   (6.0-8.0)  g/dL


 


Albumin   (3.4-4.8)  g/dL


 


Globulin   (2.0-3.5)  g/dL


 


Albumin/Globulin Ratio   (1.3-2.8)  











Result Diagrams: 


 07/17/17 07:25





 07/17/17 07:25





Consult PN Assessment/Plan


Procedures: 


Procedures





BLOOD CULTURE FOR BACTERIA (08/27/16)


BLOOD TYPING SEROLOGIC ABO (12/29/14)


BLOOD TYPING SEROLOGIC RH(D) (12/29/14)


C-REACTIVE PROTEIN (08/29/16)


COMPATIBILITY TEST ANTIGLOB (12/29/14)


COMPATIBILITY TEST INCUBATE (12/29/14)


COMPATIBILITY TEST SPIN (12/29/14)


COMPLETE CBC AUTOMATED (04/29/16)


COMPLETE CBC W/AUTO DIFF WBC (08/29/16)


COMPREHEN METABOLIC PANEL (08/27/16)


CT LOWER EXTREMITY W/DYE (03/29/17)


CT LOWER EXTREMITY W/O DYE (10/14/14)


CULTR BACTERIA EXCEPT BLOOD (08/27/16)


CULTURE OTHR SPECIMN AEROBIC (08/27/16)


MAGGIE MUSC/FASCIA 20 SQ CM/< (07/29/16)


MAGGIE SUBQ TISSUE 20 SQ CM/< (04/29/16)


DECALCIFY TISSUE (12/29/14)


ELECTROCARDIOGRAM TRACING (04/29/16)


EMERGENCY DEPT VISIT (08/27/16)


EXTREMITY STUDY (08/31/16)


FLUOROSCOPE EXAMINATION (11/21/14)


GAIT TRAINING THERAPY (03/27/15)


HEMATOCRIT (12/29/14)


HEMOGLOBIN (12/29/14)


HOT OR COLD PACKS THERAPY (03/27/15)


LEG SURGERY PROCEDURE (12/29/14)


METABOLIC PANEL TOTAL CA (04/29/16)


MRI JNT OF LWR EXTRE W/O DYE (11/22/14)


OFFICE/OUTPATIENT VISIT EST (08/29/16)


OFFICE/OUTPATIENT VISIT EST (03/31/15)


PT EVALUATION (01/30/15)


RBC ANTIBODY SCREEN (12/29/14)


RBC SED RATE AUTOMATED (08/29/16)


REMOVAL OF SUPPORT IMPLANT (11/21/14)


ROUTINE VENIPUNCTURE (08/29/16)


SMEAR GRAM STAIN (07/29/16)


THER/PROPH/DIAG INJ IV PUSH (08/27/16)


THER/PROPH/DIAG INJ SC/IM (08/27/16)


THERAPEUTIC EXERCISES (03/27/15)


TISSUE EXAM BY PATHOLOGIST (12/29/14)


TOTAL KNEE ARTHROPLASTY (12/29/14)


TX/PRO/DX INJ NEW DRUG ADDON (08/27/16)


URINALYSIS AUTO W/SCOPE (12/29/14)


URINE BACTERIA CULTURE (12/29/14)


VASOPNEUMATIC DEVICE THERAPY (02/26/15)


X-RAY EXAM KNEE 4 OR MORE (07/28/16)


X-RAY EXAM OF ANKLE (11/20/14)


X-RAY EXAM OF FOOT (04/07/14)


X-RAY EXAM OF KNEE 1 OR 2 (08/29/16)


X-RAY EXAM OF KNEE 3 (11/24/15)


X-RAY EXAM OF LOWER LEG (08/27/16)

## 2017-07-20 ENCOUNTER — HOSPITAL ENCOUNTER (OUTPATIENT)
Dept: HOSPITAL 56 - MW.MS | Age: 82
Setting detail: OBSERVATION
LOS: 2 days | Discharge: HOME | End: 2017-07-22
Attending: ORTHOPAEDIC SURGERY | Admitting: ORTHOPAEDIC SURGERY
Payer: MEDICARE

## 2017-07-20 DIAGNOSIS — Z88.2: ICD-10-CM

## 2017-07-20 DIAGNOSIS — Z88.5: ICD-10-CM

## 2017-07-20 DIAGNOSIS — Z88.8: ICD-10-CM

## 2017-07-20 DIAGNOSIS — M17.11: ICD-10-CM

## 2017-07-20 DIAGNOSIS — Z79.899: ICD-10-CM

## 2017-07-20 DIAGNOSIS — M79.89: ICD-10-CM

## 2017-07-20 DIAGNOSIS — T81.4XXA: Primary | ICD-10-CM

## 2017-07-20 DIAGNOSIS — Z96.652: ICD-10-CM

## 2017-07-20 DIAGNOSIS — Z98.890: ICD-10-CM

## 2017-07-20 DIAGNOSIS — Z79.82: ICD-10-CM

## 2017-07-20 DIAGNOSIS — Z96.651: ICD-10-CM

## 2017-07-20 LAB
CHLORIDE SERPL-SCNC: 103 MMOL/L (ref 98–110)
SODIUM SERPL-SCNC: 136 MMOL/L (ref 136–146)

## 2017-07-20 PROCEDURE — 96376 TX/PRO/DX INJ SAME DRUG ADON: CPT

## 2017-07-20 PROCEDURE — 73700 CT LOWER EXTREMITY W/O DYE: CPT

## 2017-07-20 PROCEDURE — 80048 BASIC METABOLIC PNL TOTAL CA: CPT

## 2017-07-20 PROCEDURE — G0379 DIRECT REFER HOSPITAL OBSERV: HCPCS

## 2017-07-20 PROCEDURE — 36415 COLL VENOUS BLD VENIPUNCTURE: CPT

## 2017-07-20 PROCEDURE — 96361 HYDRATE IV INFUSION ADD-ON: CPT

## 2017-07-20 PROCEDURE — 85025 COMPLETE CBC W/AUTO DIFF WBC: CPT

## 2017-07-20 PROCEDURE — 96374 THER/PROPH/DIAG INJ IV PUSH: CPT

## 2017-07-20 PROCEDURE — 87040 BLOOD CULTURE FOR BACTERIA: CPT

## 2017-07-20 PROCEDURE — 96375 TX/PRO/DX INJ NEW DRUG ADDON: CPT

## 2017-07-20 PROCEDURE — G0378 HOSPITAL OBSERVATION PER HR: HCPCS

## 2017-07-20 PROCEDURE — 86140 C-REACTIVE PROTEIN: CPT

## 2017-07-20 PROCEDURE — 73562 X-RAY EXAM OF KNEE 3: CPT

## 2017-07-20 PROCEDURE — 85652 RBC SED RATE AUTOMATED: CPT

## 2017-07-20 RX ADMIN — SODIUM CHLORIDE SCH MLS/HR: 9 INJECTION, SOLUTION INTRAMUSCULAR; INTRAVENOUS; SUBCUTANEOUS at 16:25

## 2017-07-20 RX ADMIN — ONDANSETRON PRN MG: 2 INJECTION, SOLUTION INTRAMUSCULAR; INTRAVENOUS at 16:25

## 2017-07-20 RX ADMIN — KETOROLAC TROMETHAMINE SCH MG: 15 INJECTION, SOLUTION INTRAMUSCULAR; INTRAVENOUS at 22:32

## 2017-07-20 RX ADMIN — KETOROLAC TROMETHAMINE SCH MG: 15 INJECTION, SOLUTION INTRAMUSCULAR; INTRAVENOUS at 16:25

## 2017-07-20 NOTE — PCM.SN
- Free Text/Narrative


Note: 





Full H&P from clinic note available in paper chart--was faxed to floor earlier.





Patient is s/p R TKA in the past. She had a persistent medial wound that was 

treated with I&D approximately one year ago. No involvement of the knee joint 

was noted. The wound eventually healed. On Sunday she noted increase pain, 

swelling, and drainage. Presented to the ER on Monday. Was evaluated that day 

and started on Levaquin. Has noticed an improvement in her pain and swelling 

but has noticed persistent drainage. Evaluated in clinic. Due to proximity to 

the knee joint, I recommended admission and IV abx.





Exam of LLE shows TKA incision to be well healed. Small area of erythema and 

fluctuance along the medial joint line. Serous drainage expressed from wound. 

ROM 0-95 degrees with minimal pain. Stable to varus/valgus stressing. Patella 

tracks centrally. NVI.





XR reviewed from ER show good position of the TKA prosthesis without evidence 

of loosening.





Ass: L knee soft tissue infection





1. knee immobilizer


2. WBAT RLE


3. Cubicin


4. ESR/CRP elevated, WBC normal--continue to monitor


5. BC x 2


6. consider aspiration if pain/swelling persists

## 2017-07-21 RX ADMIN — SODIUM CHLORIDE SCH MLS/HR: 9 INJECTION, SOLUTION INTRAMUSCULAR; INTRAVENOUS; SUBCUTANEOUS at 16:02

## 2017-07-21 RX ADMIN — KETOROLAC TROMETHAMINE SCH MG: 15 INJECTION, SOLUTION INTRAMUSCULAR; INTRAVENOUS at 05:05

## 2017-07-21 RX ADMIN — KETOROLAC TROMETHAMINE SCH MG: 15 INJECTION, SOLUTION INTRAMUSCULAR; INTRAVENOUS at 16:02

## 2017-07-21 RX ADMIN — KETOROLAC TROMETHAMINE SCH MG: 15 INJECTION, SOLUTION INTRAMUSCULAR; INTRAVENOUS at 10:10

## 2017-07-21 RX ADMIN — KETOROLAC TROMETHAMINE SCH MG: 15 INJECTION, SOLUTION INTRAMUSCULAR; INTRAVENOUS at 23:40

## 2017-07-21 RX ADMIN — ONDANSETRON PRN MG: 2 INJECTION, SOLUTION INTRAMUSCULAR; INTRAVENOUS at 12:53

## 2017-07-21 NOTE — CT
EXAM DATE: 17



PATIENT'S AGE: 81





Patient: [employer]



Facility: [place], [place]

Patient ID: [id number]

Site Patient ID: [id number].

Site Accession #: [id number].

: 1935

Study: CT Knee Right [id number]/21/2017 1:12:44 PM

Ordering Physician: Niko Lentz



Final Report: 

Indication:

Draining wound. History of knee prosthesis.



Comparison:

2017 CT.



Technique:

Noncontrast images centered on the right knee prosthesis. Axial, coronal and 
sagittal 2D reformats.



Findings:

Moderately large somewhat complex appearing knee joint effusion. Prominent 
artifact from the prosthesis. No periarticular fluid collection of significance 
appreciated. Femoral stem of the prosthesis extends above the field of view. No 
loosening of the femoral prosthesis or fracture of the visualized femur. Tibial 
implant shows no subsidence, osteolysis or fracture. The fibula is intact. 
Patellar resurfacing is unchanged in appearance. 



Impression:

Slight increase from comparison small to moderate knee joint effusion. No extra-
articular abscess. No CT findings for osteomyelitis although sensitivity is 
low. No CT evidence for complication associated with total knee arthroplasty.



Please note that all CT scans at this facility use dose modulation, iterative 
reconstruction, and/or weight-based dosing when appropriate to reduce radiation 
dose to as low as reasonably achievable.



Dictated by Patrick Jacome MD @ 2017 1:40PM

(Electronic Signature)



Report Signed by Proxy.
MON[facility]

## 2017-07-21 NOTE — PCM.SN
[id number] Agree with above note. Patient states she feels better. Labs 

improved. BC pending.





Exam of RLE shows less erythema and swelling. Minimal joint effusion. ~1cm 

pustule medially. TTP directly over the pustule. With palpation around the 

pustule, copious amounts of cloudy fluid expressed. Unable to palpate any 

fluctuance or induration. Unsure of site of drainage. No calf TTP. AT/EHL/

gastroc 5/5. Sensation intact. DP 2+.





Recommended that we formally explore the wound. Verbal consent obtained from 

the patient. Area around wound cleansed with Chloroprep solution. Small sterile 

hemostat was used to probe wound. Move drainage noted. Sterile Qtip used to 

probe wound. No tunneling noted. Patient tolerated well. Wet 2x2 placed into 

wound and dry dressing applied over.





1. continue wet-dry dressings bid


2. continue Cubicin


3. recheck labs in am


4. await BC results


5. discontinue immobilizaer


6. possible discharge home tomorrow on Zyvox vs continued Cubicin








<Chuyita Mata R - Last Filed: 07/21/17 13:02>





- Free Text/Narrative


Note: 


patient resting comfortably in bed


reports good nights sleep with sleeping pill


minimal pain


has been wearing immobilizer - difficult to ambulate with


reports overall feeling better since admission





vss, afeb (tmax 99.1)


knee immobilizer in place


gauze dressing with moderate serous drainage


ROM at knee deferred


WBC 10.44 (13.36 on 7/20)


ESR 73 (74 on 7/20)


CRP 27.31 (31.42 on 7/20)


blood cultures pending





s/p L TKA, L knee soft tissue infection





continue Cubicin IV today


activity as tolerated


will consider d/ch to home today

## 2017-07-22 VITALS — SYSTOLIC BLOOD PRESSURE: 127 MMHG | DIASTOLIC BLOOD PRESSURE: 58 MMHG

## 2017-07-22 RX ADMIN — KETOROLAC TROMETHAMINE SCH MG: 15 INJECTION, SOLUTION INTRAMUSCULAR; INTRAVENOUS at 04:48

## 2017-07-22 RX ADMIN — KETOROLAC TROMETHAMINE SCH MG: 15 INJECTION, SOLUTION INTRAMUSCULAR; INTRAVENOUS at 11:30

## 2017-07-22 RX ADMIN — SODIUM CHLORIDE SCH MLS/HR: 9 INJECTION, SOLUTION INTRAMUSCULAR; INTRAVENOUS; SUBCUTANEOUS at 15:15

## 2017-07-22 NOTE — PCM.PN
- General Info


Date of Service: 07/22/17


Functional Status: Reports: Pain Controlled





- Review of Systems


General: Reports: No Symptoms


HEENT: Reports: No Symptoms


Pulmonary: Reports: No Symptoms


Cardiovascular: Reports: No Symptoms


Gastrointestinal: Reports: No Symptoms


Genitourinary: Reports: No Symptoms


Systems Review Comment:: 


Patient states she is feeling much better. No c/o pain in knee. Denies fever/

chills. CT showed no abscess formation with mild joint effusion.








- Patient Data


Vitals - most recent: 


 Last Vital Signs











Temp  97.5 F   07/22/17 08:00


 


Pulse  84   07/22/17 08:00


 


Resp  22 H  07/22/17 08:00


 


BP  127/58 L  07/22/17 09:34


 


Pulse Ox  95   07/22/17 08:00











Weight - most recent: 116.3 kg


I&O - last 24 hours: 


 Intake & Output











 07/21/17 07/22/17 07/22/17





 22:59 06:59 14:59


 


Intake Total 630 400 


 


Output Total 450 800 


 


Balance 180 -400 











Lab Results last 24 hrs: 


 Laboratory Results - last 24 hr











  07/22/17 07/22/17 Range/Units





  06:30 06:30 


 


WBC  9.41   (4.0-11.0)  K/uL


 


RBC  3.40 L   (4.30-5.90)  M/uL


 


Hgb  9.5 L   (12.0-16.0)  g/dL


 


Hct  30.4 L   (36.0-46.0)  %


 


MCV  89.4   (80.0-98.0)  fL


 


MCH  27.9   (27.0-32.0)  pg


 


MCHC  31.3   (31.0-37.0)  g/dL


 


RDW Std Deviation  49.1   (28.0-62.0)  fl


 


RDW Coeff of Rai  15   (11.0-15.0)  %


 


Plt Count  229   (150-400)  K/uL


 


MPV  10.10   (7.40-12.00)  fL


 


Neut % (Auto)  73.6   (48.0-80.0)  %


 


Lymph % (Auto)  15.8 L   (16.0-40.0)  %


 


Mono % (Auto)  8.3   (0.0-15.0)  %


 


Eos % (Auto)  2.0   (0.0-7.0)  %


 


Baso % (Auto)  0.3   (0.0-1.5)  %


 


Neut # (Auto)  6.9 H   (1.4-5.7)  K/uL


 


Lymph # (Auto)  1.5   (0.6-2.4)  K/uL


 


Mono # (Auto)  0.8   (0.0-0.8)  K/uL


 


Eos # (Auto)  0.2   (0.0-0.7)  K/uL


 


Baso # (Auto)  0.0   (0.0-0.1)  K/uL


 


Nucleated RBC %  0.0   /100WBC


 


Nucleated RBCs #  0   K/uL


 


ESR  98 H   (0-29)  mm/hr


 


C-Reactive Protein   21.70 H  (0.0-0.5)  mg/dL











Jose J Results last 24 hrs: 


 Microbiology











 07/20/17 18:00 Aerobic Blood Culture - Preliminary





 Blood - Venous - Lab Draw    NO GROWTH AFTER 1 DAY





 Anaerobic Blood Culture - Preliminary





    NO GROWTH AFTER 1 DAY


 


 07/20/17 17:48 Aerobic Blood Culture - Preliminary





 Blood - Venous    NO GROWTH AFTER 1 DAY





 Anaerobic Blood Culture - Preliminary





    NO GROWTH AFTER 1 DAY











Med Orders - Current: 


 Current Medications





Acetaminophen (Tylenol Extra Strength)  1,000 mg PO Q6H PRN


   PRN Reason: Pain


   Last Admin: 07/22/17 09:35 Dose:  1,000 mg


Amlodipine Besylate (Norvasc)  10 mg PO DAILY [facility]


   Last Admin: 07/22/17 09:34 Dose:  10 mg


Hydromorphone HCl (Dilaudid)  0.5 - 1 mg IVPUSH Q3H PRN


   PRN Reason: Pain


   Last Admin: 07/21/17 11:01 Dose:  1 mg


Daptomycin 200 mg/ Sodium (Chloride)  10 mls @ 300 mls/hr IV Q24H [facility]


   Last Admin: 07/21/17 16:02 Dose:  300 mls/hr


Ketorolac Tromethamine (Toradol)  15 mg IVPUSH Q6H [facility]


   Stop: 07/25/17 14:36


   Last Admin: 07/22/17 04:48 Dose:  15 mg


Levothyroxine Sodium (Synthroid)  88 mcg PO DAILY@0730 [facility]


   Last Admin: 07/22/17 06:48 Dose:  88 mcg


Non-Formulary Medication (Denosumab [Prolia])  1 dose IM ASDIRECTED [facility]


Ondansetron HCl (Zofran)  4 mg IV Q6HR PRN


   PRN Reason: NAUSEA/VOMITING


   Last Admin: 07/21/17 12:53 Dose:  4 mg


Promethazine HCl (Phenergan)  12.5 mg IM Q6H PRN


   PRN Reason: Nausea


Sodium Chloride (Saline Flush)  10 ml FLUSH ASDIRECTED PRN


   PRN Reason: Keep Vein Open


Sodium Chloride (Saline Flush)  2.5 ml FLUSH ASDIRECTED PRN


   PRN Reason: Keep Vein Open


Temazepam (Restoril)  15 mg PO BEDTIME PRN


   PRN Reason: Insomnia


   Last Admin: 07/20/17 22:33 Dose:  15 mg





Discontinued Medications





Daptomycin (Cubicin)  200 mg IVPUSH DAILY [facility]


Lactated Ringer's (Ringers, Lactated)  1,000 mls @ 100 mls/hr IV ASDIRECTED [facility]


   Last Admin: 07/21/17 01:47 Dose:  100 mls/hr


Ketorolac Tromethamine (Toradol)  15 mg IVPUSH Q6H [facility]


   Stop: 07/25/17 14:36











- Exam


General: alert, oriented


Physical Findings Comments:: 


Exam of RLE shows wound over medial aspect of knee. Much less drainage on 

dressing than previous dressing changes. No surrounding erythema, induration. 

ROM 0-100 degrees without pain. TTP over wound, but also improved. No calf TTP. 

AT/EHL/gastroc 5/5. Sensation intact. DP 2+.








- Problem List Review


Problem List Initiated/Reviewed/Updated: Yes





- My Orders


Last 24 Hours: 


My Active Orders





07/21/17 10:51


Sodium Chloride 0.9% [Saline Flush]   10 ml FLUSH ASDIRECTED PRN 


Sodium Chloride 0.9% [Saline Flush]   2.5 ml FLUSH ASDIRECTED PRN 


Convert IV to Saline Lock [OM.PC] Routine 





07/21/17 10:52


HYDROmorphone [Dilaudid]   0.5 - 1 mg IVPUSH Q3H PRN 





07/21/17 11:30


Dressing Change [Wound Care] [RC] Q12H 


Denosumab [Prolia]   1 dose IM ASDIRECTED 


Levothyroxine [Synthroid]   88 mcg PO DAILY@0730 


amLODIPine [Norvasc]   10 mg PO DAILY 














- Plan


Plan:: 


1. cubicin today


2. d/c home after Cubicin


3. dressing change-wet to dry prior to discharge. Patient instructed to change 

dressing tomorrow. She has wound nurse visit on Monday.


4. no immobilizer


5. ROM and WBAT RLE


6. Zyvox 600mg po bid x 10 days--prescription called to [facility]


7. Tramadol prn for pain control


8. f/u with me Tuesday 11am


9. advised to call if symptoms increase prior to f/u appointment

## 2017-08-28 ENCOUNTER — HOSPITAL ENCOUNTER (INPATIENT)
Dept: HOSPITAL 56 - MW.MS | Age: 82
LOS: 3 days | Discharge: HOME | DRG: 546 | End: 2017-08-31
Attending: ORTHOPAEDIC SURGERY | Admitting: ORTHOPAEDIC SURGERY
Payer: MEDICARE

## 2017-08-28 DIAGNOSIS — Z79.899: ICD-10-CM

## 2017-08-28 DIAGNOSIS — M34.1: Primary | ICD-10-CM

## 2017-08-28 DIAGNOSIS — E03.9: ICD-10-CM

## 2017-08-28 DIAGNOSIS — Z91.040: ICD-10-CM

## 2017-08-28 DIAGNOSIS — T81.4XXA: ICD-10-CM

## 2017-08-28 DIAGNOSIS — Z88.0: ICD-10-CM

## 2017-08-28 DIAGNOSIS — Z88.8: ICD-10-CM

## 2017-08-28 RX ADMIN — SODIUM CHLORIDE SCH MLS/HR: 9 INJECTION, SOLUTION INTRAMUSCULAR; INTRAVENOUS; SUBCUTANEOUS at 18:25

## 2017-08-28 RX ADMIN — KETOROLAC TROMETHAMINE SCH MG: 30 INJECTION, SOLUTION INTRAMUSCULAR at 18:15

## 2017-08-28 NOTE — PCM.HP
<Mando Marmolejo - Last Filed: 08/28/17 17:53>





H&P History of Present Illness





- General


Date of Service: 08/28/17


Admit Problem/Dx: 


 Admission Diagnosis/Problem





Admission Diagnosis/Problem      Wound of skin








Source of Information: Patient


History Limitations: Reports: No Limitations





- History of Present Illness


Onset of Symptoms: Reports: Gradual


Duration of Symptoms: Reports: Day(s): (5 Days.), Getting Worse


Location: Reports: Lower Extremity, Right


Worsens with: Reports: Other (Wrose with activity.)


Associated Symptoms: Reports: No Other Symptoms





- Related Data


Allergies/Adverse Reactions: 


 Allergies











Allergy/AdvReac Type Severity Reaction Status Date / Time


 


codeine Allergy  Vomiting Verified 08/27/16 13:55


 


latex Allergy  Rash Verified 08/29/16 11:51


 


morphine Allergy  Difficulty Verified 08/27/16 13:55





   Breathing  


 


oxycodone HCl [From Percocet] Allergy  Vomiting Verified 08/27/16 13:55


 


Penicillins Allergy  Anaphylactic Verified 08/27/16 13:55





   Shock  


 


Sulfa (Sulfonamide Allergy  Anaphylactic Verified 08/27/16 13:55





Antibiotics)   Shock  


 


all pain meds Allergy  Nausea Uncoded 07/17/17 07:03











Home Medications: 


 Home Meds





amLODIPine Besylate [Amlodipine Besylate] 10 mg PO DAILY 12/26/14 [History]


Omeprazole Magnesium [Prilosec Otc] 20 mg PO ACBRK 12/30/14 [History]


Promethazine [Phenergan] 25 mg PO Q6H PRN #30 tablet 01/02/15 [Rx]


Denosumab [Prolia] 60 mg IM Q180D 07/28/16 [History]


Levothyroxine [Synthroid] 88 mcg PO ACBREAKFAST 07/28/16 [History]


Ondansetron HCl [Zofran] 4 mg PO Q8HR PRN #16 tablet 07/17/17 [Rx]


traMADol [Ultram] 100 mg PO Q6H PRN #80 tablet 07/22/17 [Rx]











Past Medical History





- Past Health History


Medical/Surgical History: Denies Medical/Surgical History


HEENT History: Reports: Cataract


Other HEENT History: has upper denture and lower partial


Cardiovascular History: Reports: None


Respiratory History: Reports: None


Gastrointestinal History: Reports: GERD, Irritable Bowel Syndrome, Other (See 

Below)


Other Gastrointestinal History: IBS-Diarrhea


Genitourinary History: Reports: Other (See Below)


Other Genitourinary History: overactive bladder


OB/GYN History: Reports: Pregnancy


Musculoskeletal History: Reports: Arthritis


Neurological History: Reports: Other (See Below)


Other Neuro History: hx of motion sickness


Psychiatric History: Reports: None


Endocrine/Metabolic History: Reports: Hypothyroidism


Hematologic History: Reports: None


Immunologic History: Reports: Other (See Below)


Other Immunologic History: CREST syndrome


Oncologic (Cancer) History: Reports: None


Dermatologic History: Reports: None, Other (See Below)


Other Dermatologic History: Raynaud's Syndrome





- Infectious Disease History


Infectious Disease History: Reports: Chicken Pox, Measles, MRSA, Mumps, Rubella

, Shingles





- Past Surgical History


Head Surgeries/Procedures: Reports: None


HEENT Surgical History: Reports: Cataract Surgery


Female  Surgical History: Reports: Hysterectomy


Musculoskeletal Surgical History: Reports: Knee Replacement, ORIF, Shoulder 

Surgery, Other (See Below)





Social & Family History





- Family History


Family Medical History: Noncontributory


HEENT: Reports: Cataract


Respiratory: Reports: COPD


Musculoskeletal: Reports: Arthritis


Psychiatric: Reports: Depression


Endocrine/Metabolic: Reports: Diabetes, type II





- Tobacco Use


Smoking Status *Q: Never Smoker


Years of Tobacco use: 10


Used Tobacco, but Quit: No


Second Hand Smoke Exposure: No





- Caffeine Use


Caffeine Use: Reports: Coffee





- Alcohol Use


Days Per Week of Alcohol Use: 0


Number of Drinks Per Day: 0


Total Drinks Per Week: 0





- Recreational Drug Use


Recreational Drug Use: No


Drug Use in Last 12 Months: No





H&P Review of Systems





- Review of Systems:


Review Of Systems: See Below


General: Reports: No Symptoms


Pulmonary: Reports: No Symptoms


Cardiovascular: Reports: No Symptoms


Gastrointestinal: Reports: No Symptoms


Genitourinary: Reports: No Symptoms


Musculoskeletal: Reports: Leg Pain, Joint Pain, Joint Swelling


Skin: Reports: Other (Medial wound on right knee. Draining.)


Psychiatric: Reports: No Symptoms


Neurological: Reports: No Symptoms





Exam





- Exam


Exam: See Below





- Vital Signs


Weight: 116.3 kg





- Exam


General: Alert, Oriented


HEENT: Conjunctiva Clear, Hearing Intact, Mucosa Moist & Pink


Neck: Trachea Midline


Lungs: Normal Respiratory Effort


Cardiovascular: Regular Rate


GI/Abdominal Exam: Soft, Non-Tender (Right knee ROM- lacking 15 degrees of full 

extension to 90 degrees flexion. Knee is warm with palpation. Erythema on 

lateral aspect of knee.)


Neurological: Cranial Nerves Intact


Neuro Extensive - Mental Status: Alert, Oriented x3, Normal Mood/Affect


Psychiatric: Alert, Normal Affect, Normal Mood





*Q Meaningful Use (ADM)





- VTE *Q


VTE Criteria *Q: 








- Stroke *Q


Stroke Criteria *Q: 








- AMI *Q


AMI Criteria *Q: 





Problem List Initiated/Reviewed/Updated: Yes


Orders Last 24hrs: 


 Active Orders 24 hr











 Category Date Time Status


 


 Patient Status [ADT] Routine ADT  08/28/17 17:35 Active


 


 Activity as Tolerated [RC] .Routine Care  08/28/17 17:41 Active


 


 Antiembolic Devices [RC] PER UNIT ROUTINE Care  08/28/17 17:42 Active


 


 Dressing Change [Wound Care] [RC] ASDIRECTED Care  08/28/17 17:41 Active


 


 Neurovascular Check [RC] Q2HR Care  08/28/17 17:42 Active


 


 RT Incentive Spirometry [RC] ASDIRECTED Care  08/28/17 17:41 Active


 


 Vital Signs [RC] Q4H Care  08/28/17 17:41 Active


 


 Regular Diet [DIET] Diet  08/28/17 Breakfast Active


 


 Acetaminophen [Tylenol Extra Strength] Med  08/28/17 17:43 Active





 1,000 mg PO Q6H PRN   


 


 DAPTOmycin [Cubicin] 500 mg Med  08/28/17 17:45 Active





 Sodium Chloride 0.9% [Normal Saline] 10 ml   





 IVPUSH Q24H   


 


 Denosumab [Prolia] Med  08/28/17 17:45 Active





 60 mg SUBCUT ASDIRECTED   


 


 Ketorolac [Toradol] Med  08/28/17 17:45 Active





 30 mg IVPUSH Q6H   


 


 Levothyroxine [Synthroid] Med  08/29/17 07:30 Active





 88 mcg PO ACBREAKFAST   


 


 Linezolid [Zyvox] Med  08/28/17 18:00 Active





 600 mg PO Q12H   


 


 Omeprazole Med  08/29/17 07:30 Active





 20 mg PO ACBRK   


 


 Ondansetron [Zofran] Med  08/28/17 17:43 Active





 4 mg IV Q6HR PRN   


 


 Promethazine [Phenergan] Med  08/28/17 17:43 Active





 25 mg PO Q6H PRN   


 


 amLODIPine [Norvasc] Med  08/29/17 09:00 Active





 10 mg PO DAILY   


 


 traMADol [Ultram] Med  08/28/17 17:43 Active





 100 mg PO Q6H PRN   


 


 Antiembolic Hose [OM.PC] Routine Oth  08/28/17 17:42 Ordered


 


 Ice Therapy [OM.PC] Routine Oth  08/28/17 17:41 Ordered








 Medication Orders





Acetaminophen (Tylenol Extra Strength)  1,000 mg PO Q6H PRN


   PRN Reason: Pain


Amlodipine Besylate (Norvasc)  10 mg PO DAILY [facility]


Denosumab (Prolia)  60 mg SUBCUT ASDIRECTED [facility]


Daptomycin 500 mg/ Sodium (Chloride)  10 mls @ 200 mls/hr IVPUSH Q24H [facility]


Ketorolac Tromethamine (Toradol)  30 mg IVPUSH Q6H [facility]


Levothyroxine Sodium (Synthroid)  88 mcg PO ACBREAKFAST [facility]


Linezolid (Zyvox)  600 mg PO Q12H [facility]


Omeprazole (Omeprazole)  20 mg PO ACBRK [facility]


Ondansetron HCl (Zofran)  4 mg IV Q6HR PRN


   PRN Reason: NAUSEA/VOMITING


Promethazine HCl (Phenergan)  25 mg PO Q6H PRN


   PRN Reason: Nausea/Vomiting


Tramadol HCl (Ultram)  100 mg PO Q6H PRN


   PRN Reason: Pain








Assessment/Plan Comment:: 





Admit for IV Cubicin 500mg daily.








<Mary Carmen Pope R - Last Filed: 08/29/17 10:57>





H&P History of Present Illness





- General


Admit Problem/Dx: 


 Admission Diagnosis/Problem





Admission Diagnosis/Problem      Wound of skin











Exam





- Vital Signs


Vital Signs: 


 Last Vital Signs











Temp  96.8 F   08/29/17 08:34


 


Pulse  68   08/29/17 08:34


 


Resp  20   08/29/17 08:34


 


BP  110/53 L  08/29/17 09:10


 


Pulse Ox  93 L  08/29/17 08:34














*Q Meaningful Use (ADM)





- VTE *Q


VTE Criteria *Q: 








- Stroke *Q


Stroke Criteria *Q: 








- AMI *Q


AMI Criteria *Q: 





Orders Last 24hrs: 


 Active Orders 24 hr











 Category Date Time Status


 


 Patient Status [ADT] Routine ADT  08/28/17 17:35 Active


 


 Activity as Tolerated [RC] .Routine Care  08/28/17 17:41 Active


 


 Antiembolic Devices [RC] PER UNIT ROUTINE Care  08/28/17 17:42 Active


 


 Dressing Change [Wound Care] [RC] Q12H Care  08/28/17 17:41 Active


 


 Neurovascular Check [RC] Q2HR Care  08/28/17 17:42 Active


 


 RT Incentive Spirometry [RC] ASDIRECTED Care  08/28/17 17:41 Active


 


 Vital Signs [RC] Q4H Care  08/28/17 17:41 Active


 


 Acetaminophen [Tylenol Extra Strength] Med  08/28/17 17:43 Active





 1,000 mg PO Q6H PRN   


 


 DAPTOmycin [Cubicin] 500 mg Med  08/28/17 17:45 Active





 Sodium Chloride 0.9% [Normal Saline] 10 ml   





 IVPUSH Q24H   


 


 Ketorolac [Toradol] Med  08/29/17 12:00 Active





 15 mg IVPUSH Q6H   


 


 Levothyroxine [Synthroid] Med  08/29/17 07:30 Active





 88 mcg PO ACBREAKFAST   


 


 Omeprazole Med  08/29/17 07:30 Active





 20 mg PO ACBRK   


 


 Ondansetron [Zofran] Med  08/28/17 17:43 Active





 4 mg IV Q6HR PRN   


 


 Promethazine [Phenergan] Med  08/28/17 17:43 Active





 25 mg PO Q6H PRN   


 


 amLODIPine [Norvasc] Med  08/29/17 09:00 Active





 10 mg PO DAILY   


 


 traMADol [Ultram] Med  08/28/17 17:43 Active





 100 mg PO Q6H PRN   


 


 Antiembolic Hose [OM.PC] Routine Oth  08/28/17 17:42 Ordered


 


 Ice Therapy [OM.PC] Routine Oth  08/28/17 17:41 Ordered








 Medication Orders





Acetaminophen (Tylenol Extra Strength)  1,000 mg PO Q6H PRN


   PRN Reason: Pain


   Last Admin: 08/29/17 07:23  Dose: 1,000 mg


   Admin: 08/28/17 21:15  Dose: 1,000 mg


Amlodipine Besylate (Norvasc)  10 mg PO DAILY [facility]


   Last Admin: 08/29/17 09:10  Dose: 10 mg


Daptomycin 500 mg/ Sodium (Chloride)  10 mls @ 200 mls/hr IVPUSH Q24H [facility]


   Last Admin: 08/28/17 18:25  Dose: 200 mls/hr


Ketorolac Tromethamine (Toradol)  15 mg IVPUSH Q6H [facility]


Levothyroxine Sodium (Synthroid)  88 mcg PO ACBREAKFAST [facility]


   Last Admin: 08/29/17 06:44  Dose: 88 mcg


Omeprazole (Omeprazole)  20 mg PO ACBRK [facility]


   Last Admin: 08/29/17 06:44  Dose: 20 mg


Ondansetron HCl (Zofran)  4 mg IV Q6HR PRN


   PRN Reason: NAUSEA/VOMITING


   Last Admin: 08/29/17 09:13  Dose: 4 mg


Promethazine HCl (Phenergan)  25 mg PO Q6H PRN


   PRN Reason: Nausea/Vomiting


Tramadol HCl (Ultram)  100 mg PO Q6H PRN


   PRN Reason: Pain








Assessment/Plan Comment:: 


Late addendum: Patient was seen and examined at 1800 on August 28, 2017. The 

patient was resting comfortably in her bed. She was noted to have a fever of 

101.





Exam of the right knee shows no obvious effusion. The incision site is clean 

and well-healed. The medial wound is present. There is cloudy fluid that is 

expressed from the wound. There is surrounding erythema. Her motion is 0-90. 

She has no calf tenderness. AT/EHL/gastroc 5/5.  Sensation grossly intact. DP/

PT pulses 2+.





At this time I discussed treatment options with the patient. I am unsure why 

she continues to have a draining wound. She had completely healed the wound on 

her last visit and her inflammatory labs had returned returned to normal. There 

does not appear to be any sign of involvement of the left knee joint. She 

responded well to the Cubicin on her last visit. I would like to start her on 

this and see how she does. If she fails to improve, we may need to consider a 

formal irrigation and debridement. We will continue to follow the patient 

closely in the hospital. She agrees with the plan.

## 2017-08-29 RX ADMIN — KETOROLAC TROMETHAMINE SCH MG: 30 INJECTION, SOLUTION INTRAMUSCULAR at 00:30

## 2017-08-29 RX ADMIN — KETOROLAC TROMETHAMINE SCH MG: 30 INJECTION, SOLUTION INTRAMUSCULAR at 05:44

## 2017-08-29 RX ADMIN — SODIUM CHLORIDE SCH MLS/HR: 9 INJECTION, SOLUTION INTRAMUSCULAR; INTRAVENOUS; SUBCUTANEOUS at 17:18

## 2017-08-29 RX ADMIN — OMEPRAZOLE SCH MG: 20 CAPSULE, DELAYED RELEASE ORAL at 06:44

## 2017-08-29 NOTE — PCM.PN
- General Info


Date of Service: 08/29/17


Admission Dx/Problem (Free Text): 





Painful R total knee


Functional Status: Reports: Pain Controlled, Tolerating Diet, Ambulating, 

Urinating





- Review of Systems


General: Reports: No Symptoms


HEENT: Reports: No Symptoms


Pulmonary: Reports: No Symptoms


Cardiovascular: Reports: No Symptoms


Gastrointestinal: Reports: No Symptoms


Musculoskeletal: Reports: Leg Pain, Joint Pain, Joint Swelling


Skin: Reports: Other (Right knee medial wound, draining.)


Neurological: Reports: No Symptoms





- Patient Data


Vitals - Most Recent: 


 Last Vital Signs











Temp  36.4 C   08/29/17 05:00


 


Pulse  70   08/29/17 05:00


 


Resp  20   08/29/17 05:00


 


BP  104/46 L  08/29/17 05:00


 


Pulse Ox  92 L  08/29/17 05:00











Weight - Most Recent: 49.487 kg


I&O - Last 24 Hours: 


 Intake & Output











 08/28/17 08/29/17 08/29/17





 22:59 06:59 14:59


 


Intake Total  150 


 


Output Total  650 


 


Balance  -500 











Med Orders - Current: 


 Current Medications





Acetaminophen (Tylenol Extra Strength)  1,000 mg PO Q6H PRN


   PRN Reason: Pain


   Last Admin: 08/29/17 07:23 Dose:  1,000 mg


Amlodipine Besylate (Norvasc)  10 mg PO DAILY Pending sale to [facility]


Denosumab (Prolia)  60 mg SUBCUT ASDIRECTED Pending sale to [facility]


Daptomycin 500 mg/ Sodium (Chloride)  10 mls @ 200 mls/hr IVPUSH Q24H Pending [facility] to [facility]


   Last Admin: 08/28/17 18:25 Dose:  200 mls/hr


Ketorolac Tromethamine (Toradol)  30 mg IVPUSH Q6H Pending sale to [facility]


   Last Admin: 08/29/17 05:44 Dose:  30 mg


Levothyroxine Sodium (Synthroid)  88 mcg PO ACBREAKFAST Pending sale to [facility]


   Last Admin: 08/29/17 06:44 Dose:  88 mcg


Linezolid (Zyvox)  600 mg PO Q12H Pending sale to [facility]


   Last Admin: 08/29/17 05:43 Dose:  600 mg


Omeprazole (Omeprazole)  20 mg PO ACBRK Pending sale to [facility]


   Last Admin: 08/29/17 06:44 Dose:  20 mg


Ondansetron HCl (Zofran)  4 mg IV Q6HR PRN


   PRN Reason: NAUSEA/VOMITING


Promethazine HCl (Phenergan)  25 mg PO Q6H PRN


   PRN Reason: Nausea/Vomiting


Tramadol HCl (Ultram)  100 mg PO Q6H PRN


   PRN Reason: Pain











- Exam


General: Alert, Oriented


HEENT: Pupils Reactive


Neck: Trachea Midline


Lungs: Normal Respiratory Effort


Cardiovascular: Regular Rate


Extremities: Other (Dressing clean, dry and in place. Right knee ROM- lacking 10

 of extension, 90 flexion. Anterior tibialis, extensor hallucis longus and 

gastrocnemius strength +5/5 bilaterally. Sensation intact. Dorsalis pedis and 

posterior tibial pulses +2 bilaterally. )


Wound/Incisions: Dressing Dry and Intact


Neurological: No New Focal Deficit


Psy/Mental Status: Alert, Normal Affect, Normal Mood





- Problem List Review


Problem List Initiated/Reviewed/Updated: Yes





- My Orders


Last 24 Hours: 


My Active Orders





08/28/17 17:41


Activity as Tolerated [RC] .Routine 


Dressing Change [Wound Care] [RC] Q12H 


RT Incentive Spirometry [RC] ASDIRECTED 


Vital Signs [RC] Q4H 


Ice Therapy [OM.PC] Routine 





08/28/17 17:42


Antiembolic Devices [RC] PER UNIT ROUTINE 


Neurovascular Check [RC] Q2HR 


Antiembolic Hose [OM.PC] Routine 





08/28/17 17:43


Acetaminophen [Tylenol Extra Strength]   1,000 mg PO Q6H PRN 


Ondansetron [Zofran]   4 mg IV Q6HR PRN 


Promethazine [Phenergan]   25 mg PO Q6H PRN 


traMADol [Ultram]   100 mg PO Q6H PRN 





08/28/17 17:45


DAPTOmycin [Cubicin] 500 mg   Sodium Chloride 0.9% [Normal Saline] 10 ml IVPUSH 

Q24H 


Denosumab [Prolia]   60 mg SUBCUT ASDIRECTED 


Ketorolac [Toradol]   30 mg IVPUSH Q6H 





08/28/17 18:00


Linezolid [Zyvox]   600 mg PO Q12H 





08/29/17 07:30


Levothyroxine [Synthroid]   88 mcg PO ACBREAKFAST 


Omeprazole   20 mg PO ACBRK 





08/29/17 09:00


amLODIPine [Norvasc]   10 mg PO DAILY 














- Assessment


Assessment:: 





Patient up in bed this AM.


States she is feeling better.


Pain controlled.


Tolerating diet.


Ambulating.








- Plan


Plan:: 





Continue Cubicin 500mg daily.


Continue pain management.


Encourage ambulation.

## 2017-08-29 NOTE — PCM.SN
- Free Text/Narrative


Note: 


Patient seen and examined. Agree with Jaleel PAC note. Patient states that her 

knee pain is markedly better today. She has been ambulating to the bathroom. 

She has no other complaints.





Exam of the knee shows no significant swelling. No joint effusion is noted. 

Motion is 0-95 without pain. There is some cloudy serous drainage present on 

the dressing, however this is markedly less than yesterday. She has mild 

tenderness over the open wound site. There are no other areas of tenderness or 

swelling. AT/EHL/gastroc 5/5.  Sensation grossly intact. DP/PT pulses 2+.





At this time will plan on continuing IV antibiotics. We are awaiting blood 

cultures. Will plan on performing repeat CBC, ESR, and CRP tomorrow. Further 

treatment options will be discussed following that. Patient and family agree 

with the plan.

## 2017-08-30 PROCEDURE — 0S9C3ZX DRAINAGE OF RIGHT KNEE JOINT, PERCUTANEOUS APPROACH, DIAGNOSTIC: ICD-10-PCS | Performed by: ORTHOPAEDIC SURGERY

## 2017-08-30 RX ADMIN — OMEPRAZOLE SCH MG: 20 CAPSULE, DELAYED RELEASE ORAL at 06:30

## 2017-08-30 RX ADMIN — SODIUM CHLORIDE SCH MLS/HR: 9 INJECTION, SOLUTION INTRAMUSCULAR; INTRAVENOUS; SUBCUTANEOUS at 17:13

## 2017-08-30 NOTE — PCM.SN
- Free Text/Narrative


Note: 





Patients inflammatory labs elevated. Patient still with some pain in knee 

although feels it is improving. Drainage persists.





VSS, afeb





Exam of R knee shows persistent medial wound. Cloudy drainage persists. ROM 0-

90 degrees. Minimal erythema, much improved. No calf TTP. AT/EHL/gastroc 5/5. 

Sensation intact. DP 2+.





ASS: R medial knee wound





Plan:


1. Due to increased inflammatory labs recommend R knee aspiration. Risks of 

procedure d/w patient, she agrees to proceed. Informed consent was obtained. 

Using sterile technique, ~7ml of purulent fluid aspirated. Patient tolerated 

procedure well.


2. Synovial studies show findings c/w R KNEE PERIPROSTHETIC INFECTION. Most 

likely chronic due to persistent medial knee wound. 


3. Discussed case with Dr. Gilberto Breen at [facility] in [place]. I feel 

should would be better served at a higher level of care due to availability of 

infectious disease, rehab, etc. He will see the patient for definitive surgery. 

If she remains stable, we will plan to send as outpatient.


4. Discussed plan with Agata. She would like to discuss with her family. 


5. Would plan to continue Cubicin in the interim to suppress infection.


6. Await final cultures--blood and synovial fluid.

## 2017-08-30 NOTE — PCM.PN
- General Info


Date of Service: 08/30/17


Functional Status: Reports: Pain Controlled, Tolerating Diet, Ambulating, 

Urinating





- Review of Systems


General: Reports: No Symptoms


HEENT: Reports: No Symptoms


Pulmonary: Reports: No Symptoms


Cardiovascular: Reports: No Symptoms


Gastrointestinal: Reports: No Symptoms


Musculoskeletal: Reports: Leg Pain, Joint Pain, Joint Swelling


Neurological: Reports: No Symptoms


Psychiatric: Reports: No Symptoms





- Patient Data


Vitals - Most Recent: 


 Last Vital Signs











Temp  37.2 C   08/30/17 04:00


 


Pulse  96   08/30/17 04:00


 


Resp  14   08/30/17 04:00


 


BP  120/55 L  08/30/17 04:00


 


Pulse Ox  97   08/30/17 04:00











Weight - Most Recent: 49.487 kg


I&O - Last 24 Hours: 


 Intake & Output











 08/29/17 08/30/17 08/30/17





 22:59 06:59 14:59


 


Intake Total 350 700 


 


Output Total 700 875 


 


Balance -350 -175 











Lab Results Last 24 Hours: 


 Laboratory Results - last 24 hr











  08/30/17 Range/Units





  07:25 


 


WBC  11.83 H  (4.0-11.0)  K/uL


 


RBC  3.52 L  (4.30-5.90)  M/uL


 


Hgb  9.8 L  (12.0-16.0)  g/dL


 


Hct  31.6 L  (36.0-46.0)  %


 


MCV  89.8  (80.0-98.0)  fL


 


MCH  27.8  (27.0-32.0)  pg


 


MCHC  31.0  (31.0-37.0)  g/dL


 


RDW Std Deviation  49.5  (28.0-62.0)  fl


 


RDW Coeff of Rai  15  (11.0-15.0)  %


 


Plt Count  475 H  (150-400)  K/uL


 


MPV  9.70  (7.40-12.00)  fL


 


Neut % (Auto)  74.6  (48.0-80.0)  %


 


Lymph % (Auto)  14.1 L  (16.0-40.0)  %


 


Mono % (Auto)  6.9  (0.0-15.0)  %


 


Eos % (Auto)  4.1  (0.0-7.0)  %


 


Baso % (Auto)  0.3  (0.0-1.5)  %


 


Neut # (Auto)  8.8 H  (1.4-5.7)  K/uL


 


Lymph # (Auto)  1.7  (0.6-2.4)  K/uL


 


Mono # (Auto)  0.8  (0.0-0.8)  K/uL


 


Eos # (Auto)  0.5  (0.0-0.7)  K/uL


 


Baso # (Auto)  0.0  (0.0-0.1)  K/uL


 


Nucleated RBC %  0.0  /100WBC


 


Nucleated RBCs #  0  K/uL


 


ESR  83 H  (0-29)  mm/hr











Med Orders - Current: 


 Current Medications





Acetaminophen (Tylenol Extra Strength)  1,000 mg PO Q6H PRN


   PRN Reason: Pain


   Last Admin: 08/30/17 03:41 Dose:  1,000 mg


Amlodipine Besylate (Norvasc)  10 mg PO DAILY [facility]


   Last Admin: 08/29/17 09:10 Dose:  10 mg


Daptomycin 500 mg/ Sodium (Chloride)  10 mls @ 200 mls/hr IVPUSH Q24H [facility]


   Last Admin: 08/29/17 17:18 Dose:  200 mls/hr


Levothyroxine Sodium (Synthroid)  88 mcg PO ACBREAKFAST [facility]


   Last Admin: 08/30/17 06:30 Dose:  88 mcg


Omeprazole (Omeprazole)  20 mg PO ACBRK [facility]


   Last Admin: 08/30/17 06:30 Dose:  20 mg


Ondansetron HCl (Zofran)  4 mg IV Q6HR PRN


   PRN Reason: NAUSEA/VOMITING


   Last Admin: 08/29/17 09:13 Dose:  4 mg


Promethazine HCl (Phenergan)  25 mg PO Q6H PRN


   PRN Reason: Nausea/Vomiting


   Last Admin: 08/29/17 13:27 Dose:  12.5 mg


Tramadol HCl (Ultram)  100 mg PO Q6H PRN


   PRN Reason: Pain





Discontinued Medications





Ketorolac Tromethamine (Toradol)  30 mg IVPUSH Q6H [facility]


   Last Admin: 08/29/17 05:44 Dose:  30 mg


Ketorolac Tromethamine (Toradol)  15 mg IVPUSH Q6H [facility]


   Last Admin: 08/29/17 12:08 Dose:  15 mg


Linezolid (Zyvox)  600 mg PO Q12H [facility]


   Last Admin: 08/29/17 05:43 Dose:  600 mg











- Exam


General: Alert, Oriented


HEENT: Pupils Equal, Pupils Reactive


Neck: Trachea Midline


Lungs: Normal Respiratory Effort


Cardiovascular: Regular Rate


Extremities: Other (Right knee: Again noted cloudy serous drainage present on 

the dressing. Eryhtema over lateral aspect of the knee is resolved. Warmth with 

palpation also resolved. Swelling improved. She is not tender with palpation. 

Painless ROM 0-95 degrees. AT/EHL/gastroc 5/5. Sensation intact. DP/PT pulses +

2 bilaterally.)





- Problem List Review


Problem List Initiated/Reviewed/Updated: Yes





- My Orders


Last 24 Hours: 


My Active Orders





08/29/17 07:30


Levothyroxine [Synthroid]   88 mcg PO ACBREAKFAST 


Omeprazole   20 mg PO ACBRK 





08/29/17 09:00


amLODIPine [Norvasc]   10 mg PO DAILY 





08/30/17 07:25


C-REACTIVE PROTEIN [CHEM] Routine 














- Assessment


Assessment:: 





Patient up to chair this AM.


States she is feeling better.


Pain controlled.


Tolerating diet.


Ambulating.








- Plan


Plan:: 


Inflammatory labs repeated this AM.


White count is trending down.


CRP and ESR again elevated.


Continue IV antibiotics and pain medication.


Encourage ambulation.

## 2017-08-31 VITALS — SYSTOLIC BLOOD PRESSURE: 115 MMHG | DIASTOLIC BLOOD PRESSURE: 56 MMHG

## 2017-08-31 PROCEDURE — 02HV33Z INSERTION OF INFUSION DEVICE INTO SUPERIOR VENA CAVA, PERCUTANEOUS APPROACH: ICD-10-PCS

## 2017-08-31 RX ADMIN — OMEPRAZOLE SCH MG: 20 CAPSULE, DELAYED RELEASE ORAL at 06:30

## 2017-08-31 RX ADMIN — SODIUM CHLORIDE SCH MLS/HR: 9 INJECTION, SOLUTION INTRAMUSCULAR; INTRAVENOUS; SUBCUTANEOUS at 17:33

## 2017-08-31 NOTE — PCM.SN
- Free Text/Narrative


Note: 


Patient is an 81-year-old female who has a long history of right knee 

difficulties. Her pain began in June 2014 without a specific injury. It 

progressed to the point where she was unable to bear weight. She does have a 

history of crest syndrome. 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 X-rays 

taken at that time along with a CAT scan showed a depression fracture of the 

right lateral tibial plateau along with an insufficiency fracture through the 

posterior aspect of the lateral femoral condyle. We treated this initially with 

nonweightbearing. She subsequently underwent a right total knee arthroplasty on 

December 29, 2014. Due to her bony deformity, we did proceed with an LCCK 

implant. She progressed well with physical therapy following the procedure.





On April 27, 2016 she presented to clinic with pain and induration along the 

medial aspect of her knee. This was not associated with the previous incision. 

She failed to respond to conservative treatment and on April 29, 2016 she 

underwent irrigation and debridement of the wound. At the time of surgery 

cultures were obtained which were positive for MSSA. The wound tunneled 

medially. There was no intrusion into the knee joint. She had a long course 

postoperatively that required wound therapy and VAC placement. Eventually the 

wound fully healed.





On July 20, 2017, she developed an open area along the medial aspect of the 

knee again. We did treat this with a course of IV Cubicin which did decrease 

the pain and swelling in her knee. She also underwent a CT scan of the knee 

which did not show any collection of fluid or other abnormality. She continued 

with wound therapy. On her appointment on August 14, 2017 the wound had 

completely healed and there was no further drainage.





Unfortunately, she noticed increased swelling and drainage from the right knee 

on August 27, 2017. She developed increased inflammatory laboratory studies and 

was hospitalized. She was started on Cubicin. An aspirate of her knee performed 

on August 30, 2017 showed a white count of 126,000. No crystals were noted. The 

Gram stain was positive for moderate gram-positive cocci in clusters. Final 

culture is currently pending. Blood cultures were negative 2 days. Her white 

count has normalized. A PICC line was placed today. Will plan on continuing the 

Cubicin.





We did contact the orthopedic department at [facility] in [place], [place]. 

copies of her x-rays will be FedEx to them. Copies of her notes and final 

culture results will be sent as well. The family has requested that she be 

referred to [place] as they do have family in that area. The orthopedic 

department has said that they will contact the patient once they have received 

all of the information.





I did discuss the plan with both the patient and her daughter. They are in 

agreement with this. If she develops increased drainage or begins having more 

pain over the weekend, she is advised to return to the hospital.

## 2017-08-31 NOTE — PCM.PN
- General Info


Date of Service: 08/31/17


Functional Status: Reports: Pain Controlled, Tolerating Diet, Ambulating, 

Urinating





- Review of Systems


General: Reports: No Symptoms


HEENT: Reports: No Symptoms


Pulmonary: Reports: No Symptoms


Cardiovascular: Reports: No Symptoms


Gastrointestinal: Reports: No Symptoms


Musculoskeletal: Reports: Leg Pain, Joint Pain, Joint Swelling


Neurological: Reports: No Symptoms





- Patient Data


Vitals - Most Recent: 


 Last Vital Signs











Temp  36.8 C   08/31/17 04:00


 


Pulse  90   08/31/17 04:00


 


Resp  14   08/31/17 04:00


 


BP  110/52 L  08/31/17 04:00


 


Pulse Ox  94 L  08/31/17 04:00











Weight - Most Recent: 49.487 kg


I&O - Last 24 Hours: 


 Intake & Output











 08/30/17 08/31/17 08/31/17





 22:59 06:59 14:59


 


Intake Total 860 760 


 


Output Total 400 1050 


 


Balance 460 -290 











Lab Results Last 24 Hours: 


 Laboratory Results - last 24 hr











  08/30/17 08/30/17 08/30/17 Range/Units





  07:25 07:25 11:00 


 


WBC     (4.0-11.0)  K/uL


 


RBC     (4.30-5.90)  M/uL


 


Hgb     (12.0-16.0)  g/dL


 


Hct     (36.0-46.0)  %


 


MCV     (80.0-98.0)  fL


 


MCH     (27.0-32.0)  pg


 


MCHC     (31.0-37.0)  g/dL


 


RDW Std Deviation     (28.0-62.0)  fl


 


RDW Coeff of Rai     (11.0-15.0)  %


 


Plt Count     (150-400)  K/uL


 


MPV     (7.40-12.00)  fL


 


Neut % (Auto)     (48.0-80.0)  %


 


Lymph % (Auto)     (16.0-40.0)  %


 


Mono % (Auto)     (0.0-15.0)  %


 


Eos % (Auto)     (0.0-7.0)  %


 


Baso % (Auto)     (0.0-1.5)  %


 


Neut # (Auto)     (1.4-5.7)  K/uL


 


Lymph # (Auto)     (0.6-2.4)  K/uL


 


Mono # (Auto)     (0.0-0.8)  K/uL


 


Eos # (Auto)     (0.0-0.7)  K/uL


 


Baso # (Auto)     (0.0-0.1)  K/uL


 


Nucleated RBC %     /100WBC


 


Nucleated RBCs #     K/uL


 


ESR  83 H    (0-29)  mm/hr


 


C-Reactive Protein   34.50 H   (0.0-0.5)  mg/dL


 


Fluid Type    SYN  


 


Fluid Color    RED  


 


Fluid Appearance    CLOUDY  


 


Fluid WBC    126.39  K/uL


 


Fluid RBC    0.26  M/uL


 


Fluid Mononuclear Cell    3.5  %


 


Fl Polymorphonucl Cell    96.5  %


 


Fluid Crystals    NONE SEEN  














  08/31/17 Range/Units





  06:14 


 


WBC  9.83  (4.0-11.0)  K/uL


 


RBC  3.32 L  (4.30-5.90)  M/uL


 


Hgb  9.2 L  (12.0-16.0)  g/dL


 


Hct  29.4 L  (36.0-46.0)  %


 


MCV  88.6  (80.0-98.0)  fL


 


MCH  27.7  (27.0-32.0)  pg


 


MCHC  31.3  (31.0-37.0)  g/dL


 


RDW Std Deviation  48.5  (28.0-62.0)  fl


 


RDW Coeff of Rai  15  (11.0-15.0)  %


 


Plt Count  452 H  (150-400)  K/uL


 


MPV  9.40  (7.40-12.00)  fL


 


Neut % (Auto)  75.1  (48.0-80.0)  %


 


Lymph % (Auto)  14.1 L  (16.0-40.0)  %


 


Mono % (Auto)  6.9  (0.0-15.0)  %


 


Eos % (Auto)  3.5  (0.0-7.0)  %


 


Baso % (Auto)  0.4  (0.0-1.5)  %


 


Neut # (Auto)  7.4 H  (1.4-5.7)  K/uL


 


Lymph # (Auto)  1.4  (0.6-2.4)  K/uL


 


Mono # (Auto)  0.7  (0.0-0.8)  K/uL


 


Eos # (Auto)  0.3  (0.0-0.7)  K/uL


 


Baso # (Auto)  0.0  (0.0-0.1)  K/uL


 


Nucleated RBC %  0.0  /100WBC


 


Nucleated RBCs #  0  K/uL


 


ESR   (0-29)  mm/hr


 


C-Reactive Protein   (0.0-0.5)  mg/dL


 


Fluid Type   


 


Fluid Color   


 


Fluid Appearance   


 


Fluid WBC   K/uL


 


Fluid RBC   M/uL


 


Fluid Mononuclear Cell   %


 


Fl Polymorphonucl Cell   %


 


Fluid Crystals   











Jose J Results Last 24 Hours: 


 Microbiology











 08/30/17 11:00 Gram Stain - Final





 Joint / Synovial Fluid - Knee, Right 











Med Orders - Current: 


 Current Medications





Acetaminophen (Tylenol Extra Strength)  1,000 mg PO Q6H PRN


   PRN Reason: Pain


   Last Admin: 08/31/17 03:43 Dose:  1,000 mg


Amlodipine Besylate (Norvasc)  10 mg PO DAILY [facility]


   Last Admin: 08/30/17 09:42 Dose:  10 mg


Daptomycin 500 mg/ Sodium (Chloride)  10 mls @ 200 mls/hr IVPUSH Q24H [facility]


   Last Admin: 08/30/17 17:13 Dose:  200 mls/hr


Levothyroxine Sodium (Synthroid)  88 mcg PO ACBREAKFAST [facility]


   Last Admin: 08/31/17 06:30 Dose:  88 mcg


Omeprazole (Omeprazole)  20 mg PO ACBRK [facility]


   Last Admin: 08/31/17 06:30 Dose:  20 mg


Ondansetron HCl (Zofran)  4 mg IV Q6HR PRN


   PRN Reason: NAUSEA/VOMITING


   Last Admin: 08/29/17 09:13 Dose:  4 mg


Promethazine HCl (Phenergan)  25 mg PO Q6H PRN


   PRN Reason: Nausea/Vomiting


   Last Admin: 08/31/17 06:35 Dose:  12.5 mg


Tramadol HCl (Ultram)  100 mg PO Q6H PRN


   PRN Reason: Pain





Discontinued Medications





Ketorolac Tromethamine (Toradol)  30 mg IVPUSH Q6H [facility]


   Last Admin: 08/29/17 05:44 Dose:  30 mg


Ketorolac Tromethamine (Toradol)  15 mg IVPUSH Q6H [facility]


   Last Admin: 08/29/17 12:08 Dose:  15 mg


Linezolid (Zyvox)  600 mg PO Q12H [facility]


   Last Admin: 08/29/17 05:43 Dose:  600 mg











- Exam


General: Alert, Oriented


HEENT: Pupils Equal, Pupils Reactive


Neck: Trachea Midline


Lungs: Normal Respiratory Effort


Cardiovascular: Regular Rate


Extremities: Other (Right knee: Again noted cloudy serous drainage present on 

the dressing. Painless ROM 0-95 degrees. AT/EHL/gastroc 5/5. Sensation intact. 

DP/PT pulses +2 bilaterally.)





- Problem List Review


Problem List Initiated/Reviewed/Updated: Yes





- My Orders


Last 24 Hours: 


My Active Orders





08/30/17 11:00


CULTURE BODY FLUID + SMEAR [RM] Stat 














- Assessment


Assessment:: 





Patient up to chair this AM.


Pain controlled.


Tolerating diet.


Ambulating.








- Plan


Plan:: 


WBC repeated today- 9.83.


R knee synovial studies did show infection-- Dr. Pope did discuss plan with 

patient yesterday.


Continue Cubicin 500 mg IV daily. 


Continue pain management and dressing changes.

## 2017-08-31 NOTE — PCM.SN
- Free Text/Narrative


Note: 


Discharge summary





Dressing change prior to discharge.


See discharge plan for complete list of discharge medications and instructions.





Dictation #: [id number]

## 2017-09-01 ENCOUNTER — HOSPITAL ENCOUNTER (INPATIENT)
Dept: HOSPITAL 56 - MW.ED | Age: 82
LOS: 2 days | Discharge: NURSING, REHAB OR LONG-TERM CARE | DRG: 561 | End: 2017-09-03
Attending: ORTHOPAEDIC SURGERY | Admitting: ORTHOPAEDIC SURGERY
Payer: MEDICARE

## 2017-09-01 DIAGNOSIS — E03.9: ICD-10-CM

## 2017-09-01 DIAGNOSIS — R11.2: ICD-10-CM
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
## 2017-09-01 DIAGNOSIS — Z96.651: ICD-10-CM

## 2017-09-01 DIAGNOSIS — Z88.8: ICD-10-CM

## 2017-09-01 DIAGNOSIS — Z91.040: ICD-10-CM

## 2017-09-01 DIAGNOSIS — I73.00: ICD-10-CM

## 2017-09-01 DIAGNOSIS — T84.53XA: ICD-10-CM

## 2017-09-01 DIAGNOSIS — M00.9: Primary | ICD-10-CM

## 2017-09-01 DIAGNOSIS — B95.61: ICD-10-CM

## 2017-09-01 DIAGNOSIS — Z79.899: ICD-10-CM

## 2017-09-01 DIAGNOSIS — T40.695A: ICD-10-CM

## 2017-09-01 DIAGNOSIS — M34.1: ICD-10-CM

## 2017-09-01 DIAGNOSIS — Z88.0: ICD-10-CM

## 2017-09-01 DIAGNOSIS — T39.395A: ICD-10-CM

## 2017-09-01 LAB
CHLORIDE SERPL-SCNC: 102 MMOL/L (ref 98–110)
SODIUM SERPL-SCNC: 138 MMOL/L (ref 136–146)

## 2017-09-01 PROCEDURE — 96374 THER/PROPH/DIAG INJ IV PUSH: CPT

## 2017-09-01 PROCEDURE — 71010: CPT

## 2017-09-01 PROCEDURE — 85652 RBC SED RATE AUTOMATED: CPT

## 2017-09-01 PROCEDURE — 87040 BLOOD CULTURE FOR BACTERIA: CPT

## 2017-09-01 PROCEDURE — 82150 ASSAY OF AMYLASE: CPT

## 2017-09-01 PROCEDURE — 83605 ASSAY OF LACTIC ACID: CPT

## 2017-09-01 PROCEDURE — G0480 DRUG TEST DEF 1-7 CLASSES: HCPCS

## 2017-09-01 PROCEDURE — 96375 TX/PRO/DX INJ NEW DRUG ADDON: CPT

## 2017-09-01 PROCEDURE — 96376 TX/PRO/DX INJ SAME DRUG ADON: CPT

## 2017-09-01 PROCEDURE — 86140 C-REACTIVE PROTEIN: CPT

## 2017-09-01 PROCEDURE — 73560 X-RAY EXAM OF KNEE 1 OR 2: CPT

## 2017-09-01 PROCEDURE — 85610 PROTHROMBIN TIME: CPT

## 2017-09-01 PROCEDURE — 99285 EMERGENCY DEPT VISIT HI MDM: CPT

## 2017-09-01 PROCEDURE — 85025 COMPLETE CBC W/AUTO DIFF WBC: CPT

## 2017-09-01 PROCEDURE — 96361 HYDRATE IV INFUSION ADD-ON: CPT

## 2017-09-01 PROCEDURE — 80074 ACUTE HEPATITIS PANEL: CPT

## 2017-09-01 PROCEDURE — 83690 ASSAY OF LIPASE: CPT

## 2017-09-01 PROCEDURE — 80053 COMPREHEN METABOLIC PANEL: CPT

## 2017-09-01 PROCEDURE — 36415 COLL VENOUS BLD VENIPUNCTURE: CPT

## 2017-09-01 PROCEDURE — 76705 ECHO EXAM OF ABDOMEN: CPT

## 2017-09-01 PROCEDURE — 83540 ASSAY OF IRON: CPT

## 2017-09-01 RX ADMIN — KETOROLAC TROMETHAMINE PRN MG: 15 INJECTION, SOLUTION INTRAMUSCULAR; INTRAVENOUS at 17:14

## 2017-09-01 NOTE — PCM.HP
H&P History of Present Illness





- General


Date of Service: 09/01/17


Source of Information: Patient, Family, Old Records


History Limitations: Reports: No Limitations





- History of Present Illness


Initial Comments - Free Text/Narative: 





Patient is an 81-year-old female who has a long history of right knee 

difficulties. Her pain began in June 2014 without a specific injury. It 

progressed to the point where she was unable to bear weight. She does have a 

history of CREST syndrome. 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 X-rays 

taken at that time along with a CAT scan showed a depression fracture of the 

right lateral tibial plateau along with an insufficiency fracture through the 

posterior aspect of the lateral femoral condyle. We treated this initially with 

nonweightbearing. She subsequently underwent a right total knee arthroplasty on 

December 29, 2014. Due to her bony deformity, we did proceed with an LCCK 

implant. She progressed well with physical therapy following the procedure.





On April 27, 2016 she presented to clinic with pain and induration along the 

medial aspect of her knee. This was not associated with the previous incision. 

She failed to respond to conservative treatment and on April 29, 2016 she 

underwent irrigation and debridement of the wound. At the time of surgery 

cultures were obtained which were positive for MSSA. The wound tunneled 

medially. There was no intrusion into the knee joint. She had a long course 

postoperatively that required wound therapy and VAC placement. Eventually the 

wound fully healed.





On July 20, 2017, she developed an open area along the medial aspect of the 

knee again. We did treat this with a course of IV Cubicin which did decrease 

the pain and swelling in her knee. She also underwent a CT scan of the knee 

which did not show any collection of fluid or other abnormality. She continued 

with wound therapy. On her appointment on August 14, 2017 the wound had 

completely healed and there was no further drainage.





Unfortunately, she noticed increased swelling and drainage from the right knee 

on August 27, 2017. She developed increased inflammatory laboratory studies and 

was hospitalized. She was started on Cubicin. An aspirate of her knee performed 

on August 30, 2017 showed a white count of 126,000. No crystals were noted. The 

Gram stain was positive for moderate gram-positive cocci in clusters. Final 

culture +MSSA. Blood cultures were negative 2 days. Her white count had 

normalized. A PICC line was placed.





We did contact the orthopedic department at [facility] in [place], [place]. 

copies of her x-rays will be FedEx to them. Copies of her notes and final 

culture results will be sent as well. The family has requested that she be 

referred to [facility] for definitive treatment of the knee as they do have family 

in that area. The orthopedic department has said that they will contact the 

patient once they have received all of the information.





Patient was discharged home yesterday after placement of a PICC line. Received 

Cubicin last evening. She was feeling quite good yesterday and wBC count had 

normalized. Over night and this am she began aching all over. [place] tired and 

was having trouble ambulating. I spoke with patient via telephone and 

recommended she go to the ER. In the ER, she had an extensive work up including 

labs, XR, and blood cultures. Vitals were stable. WBC elevated at 12. ESR and 

CRP elevated but trending down. she has been taking Tylenol scheduled for pain. 

She has an intolerance to most medications and takes phenergan regularly. Has 

not been able to drink much or eat since she was discharged last evening.


Quality: Reports: Dull


Severity: Moderate


Improves with: Reports: Rest


Worsens with: Reports: Movement


Associated Symptoms: Reports: Loss of Appetite, Nausea/Vomiting, Weakness.  

Denies: Chest Pain, Cough, Fever/Chills, Headaches, Shortness of Breath, Syncope


  ** Right Knee


Pain Score (Numeric/FACES): 10





- Related Data


Allergies/Adverse Reactions: 


 Allergies











Allergy/AdvReac Type Severity Reaction Status Date / Time


 


codeine Allergy  Vomiting Verified 09/01/17 12:10


 


latex Allergy  Rash Verified 09/01/17 12:10


 


morphine Allergy  Difficulty Verified 09/01/17 12:10





   Breathing  


 


oxycodone HCl [From Percocet] Allergy  Vomiting Verified 09/01/17 12:10


 


Penicillins Allergy  Anaphylactic Verified 09/01/17 12:10





   Shock  


 


Sulfa (Sulfonamide Allergy  Anaphylactic Verified 09/01/17 12:10





Antibiotics)   Shock  


 


all pain meds Allergy  Nausea Uncoded 07/17/17 07:03











Home Medications: 


 Home Meds





amLODIPine Besylate [Amlodipine Besylate] 10 mg PO DAILY 12/26/14 [History]


Omeprazole Magnesium [Prilosec Otc] 20 mg PO ACBRK 12/30/14 [History]


Promethazine [Phenergan] 25 mg PO Q6H PRN #30 tablet 01/02/15 [Rx]


Denosumab [Prolia] 60 mg IM Q180D 07/28/16 [History]


Levothyroxine [Synthroid] 88 mcg PO ACBREAKFAST 07/28/16 [History]


Ondansetron HCl [Zofran] 4 mg PO Q8HR PRN #16 tablet 07/17/17 [Rx]


traMADol [Ultram] 100 mg PO Q6H PRN #80 tablet 07/22/17 [Rx]


Acetaminophen [Tylenol Extra Strength] 1,000 mg PO Q6H PRN  tablet 08/31/17 [Rx]











Past Medical History





- Past Health History


Medical/Surgical History: Denies Medical/Surgical History


HEENT History: Reports: Cataract


Other HEENT History: has upper denture and lower partial


Cardiovascular History: Reports: None


Respiratory History: Reports: None


Gastrointestinal History: Reports: GERD, Irritable Bowel Syndrome, Other (See 

Below)


Other Gastrointestinal History: IBS-Diarrhea


Genitourinary History: Reports: Other (See Below)


Other Genitourinary History: overactive bladder


OB/GYN History: Reports: Pregnancy


Musculoskeletal History: Reports: Arthritis


Neurological History: Reports: Other (See Below)


Other Neuro History: hx of motion sickness


Psychiatric History: Reports: None


Endocrine/Metabolic History: Reports: Hypothyroidism


Hematologic History: Reports: None


Immunologic History: Reports: Other (See Below)


Other Immunologic History: CREST syndrome


Oncologic (Cancer) History: Reports: None


Dermatologic History: Reports: None, Other (See Below)


Other Dermatologic History: Raynaud's Syndrome





- Infectious Disease History


Infectious Disease History: Reports: Chicken Pox, Measles, Mumps, Shingles





- Past Surgical History


Head Surgeries/Procedures: Reports: None


HEENT Surgical History: Reports: Cataract Surgery


Female  Surgical History: Reports: Hysterectomy


Musculoskeletal Surgical History: Reports: Knee Replacement, ORIF, Shoulder 

Surgery





Social & Family History





- Family History


Family Medical History: Noncontributory


HEENT: Reports: Cataract


Respiratory: Reports: COPD


Musculoskeletal: Reports: Arthritis


Psychiatric: Reports: Depression


Endocrine/Metabolic: Reports: Diabetes, type II





- Tobacco Use


Smoking Status *Q: Never Smoker


Years of Tobacco use: 10


Used Tobacco, but Quit: No


Second Hand Smoke Exposure: No





- Caffeine Use


Caffeine Use: Reports: Coffee





- Alcohol Use


Days Per Week of Alcohol Use: 0


Number of Drinks Per Day: 0


Total Drinks Per Week: 0





- Recreational Drug Use


Recreational Drug Use: No


Drug Use in Last 12 Months: No





H&P Review of Systems





- Review of Systems:


Review Of Systems: See Below


General: Reports: Fatigue, Decreased Appetite.  Denies: Fever, Chills


HEENT: Reports: No Symptoms


Pulmonary: Reports: No Symptoms.  Denies: Shortness of Breath, Cough


Cardiovascular: Reports: No Symptoms.  Denies: Chest Pain, Palpitations


Gastrointestinal: Reports: No Symptoms.  Denies: Abdominal Pain


Genitourinary: Reports: No Symptoms


Musculoskeletal: Reports: Joint Pain


Skin: Reports: Wound


Psychiatric: Reports: No Symptoms


Neurological: Reports: Weakness.  Denies: Confusion, Headache


Hematologic/Lymphatic: Reports: No Symptoms


Immunologic: Reports: No Symptoms





Exam





- Exam


Exam: See Below





- Vital Signs


Vital Signs: 


 Last Vital Signs











Temp  97.5 F   09/01/17 12:05


 


Pulse  97   09/01/17 12:50


 


Resp  16   09/01/17 12:50


 


BP  152/85 H  09/01/17 12:50


 


Pulse Ox  94 L  09/01/17 12:50











Weight: 43.3 kg





- Exam


General: Alert, Oriented, 4


HEENT: Conjunctiva Clear, Hearing Intact, Nares Patent


Neck: Supple, Trachea Midline, 2


Lungs: Normal Respiratory Effort


Cardiovascular: Regular Rhythm


GI/Abdominal Exam: Soft, Non-Tender


 (Female) Exam: Deferred


Rectal (Female) Exam: Deferred


Psychiatric: Alert, Normal Affect, Normal Mood


Physical Exam Comments:: 


exam of RLE shows well healed anterior incision. Medial wound persists with 

cloudy drainage. No surrounding erythema or induration. ROM 0-90 degrees. No 

calf TTP. AT/EHL/gastroc 5/5. Sensation intact. DP 2+.








- Patient Data


Lab Results Last 24 hrs: 


 Laboratory Results - last 24 hr











  09/01/17 09/01/17 09/01/17 Range/Units





  13:00 13:00 13:00 


 


WBC  12.20 H    (4.0-11.0)  K/uL


 


RBC  3.52 L    (4.30-5.90)  M/uL


 


Hgb  9.8 L    (12.0-16.0)  g/dL


 


Hct  30.9 L    (36.0-46.0)  %


 


MCV  87.8    (80.0-98.0)  fL


 


MCH  27.8    (27.0-32.0)  pg


 


MCHC  31.7    (31.0-37.0)  g/dL


 


RDW Std Deviation  47.7    (28.0-62.0)  fl


 


RDW Coeff of Rai  15    (11.0-15.0)  %


 


Plt Count  492 H    (150-400)  K/uL


 


MPV  9.70    (7.40-12.00)  fL


 


Neut % (Auto)  84.7 H    (48.0-80.0)  %


 


Lymph % (Auto)  9.3 L    (16.0-40.0)  %


 


Mono % (Auto)  5.3    (0.0-15.0)  %


 


Eos % (Auto)  0.5    (0.0-7.0)  %


 


Baso % (Auto)  0.2    (0.0-1.5)  %


 


Neut # (Auto)  10.3 H    (1.4-5.7)  K/uL


 


Lymph # (Auto)  1.1    (0.6-2.4)  K/uL


 


Mono # (Auto)  0.7    (0.0-0.8)  K/uL


 


Eos # (Auto)  0.1    (0.0-0.7)  K/uL


 


Baso # (Auto)  0.0    (0.0-0.1)  K/uL


 


Nucleated RBC %  0.0    /100WBC


 


Nucleated RBCs #  0    K/uL


 


ESR  78 H    (0-29)  mm/hr


 


Lactate   1.0   (0.20-2.00)  mmol/L


 


Sodium    138  (136-146)  mmol/L


 


Potassium    3.9  (3.5-5.1)  mmol/L


 


Chloride    102  ()  mmol/L


 


Carbon Dioxide    23  (21-31)  mmol/L


 


BUN    15  (6.0-23.0)  mg/dL


 


Creatinine    0.7  (0.6-1.5)  mg/dL


 


Est Cr Clr Drug Dosing    43.81  mL/min


 


Estimated GFR (MDRD)    > 60.0  ml/min


 


Glucose    95  ()  mg/dL


 


Calcium    9.1  (8.8-10.8)  mg/dL


 


Total Bilirubin    0.2  (0.1-1.5)  mg/dL


 


AST    312 H  (5-40)  IU/L


 


ALT    115 H  (8-54)  IU/L


 


Alkaline Phosphatase    115  ()  


 


C-Reactive Protein    22.05 H  (0.0-0.5)  mg/dL


 


Total Protein    6.7  (6.0-8.0)  g/dL


 


Albumin    3.1 L  (3.4-4.8)  g/dL


 


Globulin    3.6 H  (2.0-3.5)  g/dL


 


Albumin/Globulin Ratio    0.9 L  (1.3-2.8)  











Result Diagrams: 


 09/01/17 13:00





 09/01/17 13:00


Jose J Results Last 24 hrs: 


XR R knee shows prosthesis in good position. No acute abnormality. Microbiology











 09/01/17 13:36 Anaerobic Blood Culture - Final





 Blood - Venous - Lab Draw 














*Q Meaningful Use (ADM)





- VTE *Q


VTE Criteria *Q: 








- Stroke *Q


Stroke Criteria *Q: 








- AMI *Q


AMI Criteria *Q: 








- Problem List


(1) Infection of total joint prosthesis


SNOMED Code(s): 764652029


   ICD Code: T84.50XA - INFECT/INFLM REACTION DUE TO UNSP INT JOINT PROSTH, 

INIT   Status: Acute   Current Visit: No   


Qualifiers: 


   Encounter type: subsequent encounter   Qualified Code(s): T84.50XD - 

Infection and inflammatory reaction due to unspecified internal joint prosthesis

, subsequent encounter   


Problem List Initiated/Reviewed/Updated: Yes


Orders Last 24hrs: 


 Active Orders 24 hr











 Category Date Time Status


 


 Echo 2D wo Cont [US] Stat Exams  09/01/17 14:36 Ordered


 


 ACETAMINOPHEN [CHEM] Stat Lab  09/01/17 14:32 Ordered


 


 CULTURE BLOOD [BC] Stat Lab  09/01/17 13:00 Received


 


 CULTURE BLOOD [BC] Stat Lab  09/01/17 13:36 Results


 


 INR,PT,PROTHROMBIN TIME [COAG] Stat Lab  09/01/17 14:35 Ordered


 


 Sodium Chloride 0.9% [Normal Saline] 1,000 ml Med  09/01/17 12:45 Active





 IV ASDIRECTED   


 


 Blood Culture x2 Reflex Set [OM.PC] Stat Oth  09/01/17 12:45 Ordered








 Medication Orders





Sodium Chloride (Normal Saline)  1,000 mls @ 999 mls/hr IV ASDIRECTED HANSA


   Last Admin: 09/01/17 13:03  Dose: 999 mls/hr








Assessment/Plan Comment:: 


1. Admit for continued observation and IV abx


2. BC ordered in ER--await results


3. watch labs--WBC, ALT/AST


4. hospitalist consult for assistance with medical management


5. dressing change R knee twice daily and prn

## 2017-09-01 NOTE — CR
EXAMINATION:  Fluoro and ultrasound guided left-sided PICC line placement.

 

HISTORY: Long-term antibiotics.

 

TECHNIQUE/FINDINGS:  After written informed consent was obtained from the patient using ultrasound an
d Fluoro guidance under aseptic conditions utilizing 1% lidocaine as local anesthesia left basilic ve
in was accessed and a dual lumen 5 [place] PICC catheter was deployed with its tip in the distal super
ior vena cava. The catheter flushes and withdraws blood well. The catheter is  flushed with the dilut
ed heparin. The catheter secured well.

 

 

IMPRESSION: Successful Fluoro and ultrasound guided PICC line placement.

## 2017-09-01 NOTE — US
EXAMINATION: Right upper quadrant ultrasound

 

HISTORY: Elevated liver function tests

 

COMPARISON: None

 

TECHNIQUE: Grayscale, color Doppler, and spectral Doppler images obtained.

 

FINDINGS: The visualized pancreas appears grossly normal. The liver is normal in contour and echotext
ure without a focal hepatic mass.

 

The gallbladder wall thickness is normal. No pericholecystic fluid or shadowing gallstone. Common fly
e duct measures 9 mm without a definite filling defect. Right kidney measures 10.1 cm [id number] wi
thout evidence of hydronephrosis. Small renal cortical cysts noted. Sonographic Aragon sign is negati
ve.

 

IMPRESSION: Grossly unremarkable right upper quadrant ultrasound.

## 2017-09-01 NOTE — US
EXAMINATION:  Fluoro and ultrasound guided left-sided PICC line placement.

 

HISTORY: Long-term antibiotics.

 

TECHNIQUE/FINDINGS:  After written informed consent was obtained from the patient using ultrasound an
d Fluoro guidance under aseptic conditions utilizing 1% lidocaine as local anesthesia left basilic ve
in was accessed and a dual lumen 5 [place] PICC catheter was deployed with its tip in the distal super
ior vena cava. The catheter flushes and withdraws blood well. The catheter is  flushed with the dilut
ed heparin. The catheter secured well.

 

 

IMPRESSION: Successful Fluoro and ultrasound guided PICC line placement.

## 2017-09-01 NOTE — CR
EXAMINATION: Portable chest radiograph.

 

HISTORY: Septic knee.

 

FINDINGS: 

The trachea is midline. The cardiomediastinal silhouette is within normal limits. No pulmonary infilt
rates, effusions or pneumothorax. There is a left-sided PICC line with tip in good position in the SV
C.

 

Degenerative changes noted within the right shoulder.

 

IMPRESSION: 

No acute cardiopulmonary process.

## 2017-09-01 NOTE — PCM.HP
H&P History of Present Illness





- General


Admit Problem/Dx: 


 Admission Diagnosis/Problem





Admission Diagnosis/Problem      Infection of knee











- History of Present Illness


Initial Comments - Free Text/Narative: 





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 

admission for MSSA prosthetic joint infection.  She is being treated with 

daptomycin.  She takes 6 grams ot tylenol a day for pain.  She reports unable 

to tolerated narcotics or NSAIDs due to nausea.  She presents to the ED today 

with complaint of nausea, vomiting and generalized weakness.  She denies any 

fevers or chills.  She denies any blood in her stool.  


  ** Right Knee


Pain Score (Numeric/FACES): 10





- Related Data


Allergies/Adverse Reactions: 


 Allergies











Allergy/AdvReac Type Severity Reaction Status Date / Time


 


codeine Allergy  Vomiting Verified 09/01/17 12:10


 


latex Allergy  Rash Verified 09/01/17 12:10


 


morphine Allergy  Difficulty Verified 09/01/17 12:10





   Breathing  


 


oxycodone HCl [From Percocet] Allergy  Vomiting Verified 09/01/17 12:10


 


Penicillins Allergy  Anaphylactic Verified 09/01/17 12:10





   Shock  


 


Sulfa (Sulfonamide Allergy  Anaphylactic Verified 09/01/17 12:10





Antibiotics)   Shock  


 


all pain meds Allergy  Nausea Uncoded 07/17/17 07:03











Home Medications: 


 Home Meds





amLODIPine Besylate [Amlodipine Besylate] 10 mg PO DAILY 12/26/14 [History]


Omeprazole Magnesium [Prilosec Otc] 20 mg PO ACBRK 12/30/14 [History]


Promethazine [Phenergan] 25 mg PO Q6H PRN #30 tablet 01/02/15 [Rx]


Denosumab [Prolia] 60 mg IM Q180D 07/28/16 [History]


Levothyroxine [Synthroid] 88 mcg PO ACBREAKFAST 07/28/16 [History]


Ondansetron HCl [Zofran] 4 mg PO Q8HR PRN #16 tablet 07/17/17 [Rx]


traMADol [Ultram] 100 mg PO Q6H PRN #80 tablet 07/22/17 [Rx]


Acetaminophen [Tylenol Extra Strength] 1,000 mg PO Q6H PRN  tablet 08/31/17 [Rx]


traMADol [Ultram] 100 mg PO Q6H PRN #80 tablet 09/03/17 [Rx]











Past Medical History





- Past Health History


Medical/Surgical History: Denies Medical/Surgical History


HEENT History: Reports: Cataract


Other HEENT History: has upper denture and lower partial


Cardiovascular History: Reports: None


Respiratory History: Reports: None


Gastrointestinal History: Reports: GERD, Irritable Bowel Syndrome, Other (See 

Below)


Other Gastrointestinal History: IBS-Diarrhea


Genitourinary History: Reports: Other (See Below)


Other Genitourinary History: overactive bladder


OB/GYN History: Reports: Pregnancy


Musculoskeletal History: Reports: Arthritis


Neurological History: Reports: Other (See Below)


Other Neuro History: hx of motion sickness


Psychiatric History: Reports: None


Endocrine/Metabolic History: Reports: Hypothyroidism


Hematologic History: Reports: None


Immunologic History: Reports: Other (See Below)


Other Immunologic History: CREST syndrome


Oncologic (Cancer) History: Reports: None


Dermatologic History: Reports: None, Other (See Below)


Other Dermatologic History: Raynaud's Syndrome





- Infectious Disease History


Infectious Disease History: Reports: Chicken Pox, Measles, Mumps, Shingles





- Past Surgical History


Head Surgeries/Procedures: Reports: None


HEENT Surgical History: Reports: Cataract Surgery


Female  Surgical History: Reports: Hysterectomy


Musculoskeletal Surgical History: Reports: Knee Replacement, ORIF, Shoulder 

Surgery





Social & Family History





- Family History


Family Medical History: Noncontributory


HEENT: Reports: Cataract


Respiratory: Reports: COPD


Musculoskeletal: Reports: Arthritis


Psychiatric: Reports: Depression


Endocrine/Metabolic: Reports: Diabetes, type II





- Tobacco Use


Smoking Status *Q: Never Smoker


Years of Tobacco use: 10


Used Tobacco, but Quit: No


Second Hand Smoke Exposure: No





- Caffeine Use


Caffeine Use: Reports: Coffee





- Alcohol Use


Days Per Week of Alcohol Use: 0


Number of Drinks Per Day: 0


Total Drinks Per Week: 0





- Recreational Drug Use


Recreational Drug Use: No


Drug Use in Last 12 Months: No





H&P Review of Systems





- Review of Systems:


Review Of Systems: ROS reveals no pertinent complaints other than HPI.





Exam





- Exam


Exam: See Below





- Vital Signs


Vital Signs: 


 Last Vital Signs











Temp  36.4 C   09/01/17 12:05


 


Pulse  100   09/01/17 14:57


 


Resp  16   09/01/17 14:57


 


BP  132/72   09/01/17 14:57


 


Pulse Ox  93 L  09/01/17 14:57











Weight: 43.3 kg





- Exam


General: Alert, Oriented, 4


Neck: Supple, Trachea Midline, 2


Lungs: Clear to Auscultation, Normal Respiratory Effort


Cardiovascular: Regular Rate, Regular Rhythm


Extremities: Normal Inspection, Non-Tender, No Pedal Edema


Skin: Warm, Dry, Intact


Neuro Extensive - Mental Status: Normal Cognition





- Patient Data


Lab Results Last 24 hrs: 


 Laboratory Results - last 24 hr











  09/01/17 09/01/17 09/01/17 Range/Units





  13:00 13:00 13:00 


 


WBC  12.20 H    (4.0-11.0)  K/uL


 


RBC  3.52 L    (4.30-5.90)  M/uL


 


Hgb  9.8 L    (12.0-16.0)  g/dL


 


Hct  30.9 L    (36.0-46.0)  %


 


MCV  87.8    (80.0-98.0)  fL


 


MCH  27.8    (27.0-32.0)  pg


 


MCHC  31.7    (31.0-37.0)  g/dL


 


RDW Std Deviation  47.7    (28.0-62.0)  fl


 


RDW Coeff of Rai  15    (11.0-15.0)  %


 


Plt Count  492 H    (150-400)  K/uL


 


MPV  9.70    (7.40-12.00)  fL


 


Neut % (Auto)  84.7 H    (48.0-80.0)  %


 


Lymph % (Auto)  9.3 L    (16.0-40.0)  %


 


Mono % (Auto)  5.3    (0.0-15.0)  %


 


Eos % (Auto)  0.5    (0.0-7.0)  %


 


Baso % (Auto)  0.2    (0.0-1.5)  %


 


Neut # (Auto)  10.3 H    (1.4-5.7)  K/uL


 


Lymph # (Auto)  1.1    (0.6-2.4)  K/uL


 


Mono # (Auto)  0.7    (0.0-0.8)  K/uL


 


Eos # (Auto)  0.1    (0.0-0.7)  K/uL


 


Baso # (Auto)  0.0    (0.0-0.1)  K/uL


 


Nucleated RBC %  0.0    /100WBC


 


Nucleated RBCs #  0    K/uL


 


ESR  78 H    (0-29)  mm/hr


 


INR     (0.86-1.11)  


 


Lactate   1.0   (0.20-2.00)  mmol/L


 


Sodium    138  (136-146)  mmol/L


 


Potassium    3.9  (3.5-5.1)  mmol/L


 


Chloride    102  ()  mmol/L


 


Carbon Dioxide    23  (21-31)  mmol/L


 


BUN    15  (6.0-23.0)  mg/dL


 


Creatinine    0.7  (0.6-1.5)  mg/dL


 


Est Cr Clr Drug Dosing    43.81  mL/min


 


Estimated GFR (MDRD)    > 60.0  ml/min


 


Glucose    95  ()  mg/dL


 


Calcium    9.1  (8.8-10.8)  mg/dL


 


Iron     ()  ug/dL


 


Total Bilirubin    0.2  (0.1-1.5)  mg/dL


 


AST    312 H  (5-40)  IU/L


 


ALT    115 H  (8-54)  IU/L


 


Alkaline Phosphatase    115  ()  


 


C-Reactive Protein    22.05 H  (0.0-0.5)  mg/dL


 


Total Protein    6.7  (6.0-8.0)  g/dL


 


Albumin    3.1 L  (3.4-4.8)  g/dL


 


Globulin    3.6 H  (2.0-3.5)  g/dL


 


Albumin/Globulin Ratio    0.9 L  (1.3-2.8)  


 


Amylase     (10-90)  U/L


 


Lipase     (7-80)  U/L


 


Acetaminophen     ug/mL














  09/01/17 09/01/17 09/01/17 Range/Units





  13:00 13:00 13:00 


 


WBC     (4.0-11.0)  K/uL


 


RBC     (4.30-5.90)  M/uL


 


Hgb     (12.0-16.0)  g/dL


 


Hct     (36.0-46.0)  %


 


MCV     (80.0-98.0)  fL


 


MCH     (27.0-32.0)  pg


 


MCHC     (31.0-37.0)  g/dL


 


RDW Std Deviation     (28.0-62.0)  fl


 


RDW Coeff of Rai     (11.0-15.0)  %


 


Plt Count     (150-400)  K/uL


 


MPV     (7.40-12.00)  fL


 


Neut % (Auto)     (48.0-80.0)  %


 


Lymph % (Auto)     (16.0-40.0)  %


 


Mono % (Auto)     (0.0-15.0)  %


 


Eos % (Auto)     (0.0-7.0)  %


 


Baso % (Auto)     (0.0-1.5)  %


 


Neut # (Auto)     (1.4-5.7)  K/uL


 


Lymph # (Auto)     (0.6-2.4)  K/uL


 


Mono # (Auto)     (0.0-0.8)  K/uL


 


Eos # (Auto)     (0.0-0.7)  K/uL


 


Baso # (Auto)     (0.0-0.1)  K/uL


 


Nucleated RBC %     /100WBC


 


Nucleated RBCs #     K/uL


 


ESR     (0-29)  mm/hr


 


INR   1.08   (0.86-1.11)  


 


Lactate     (0.20-2.00)  mmol/L


 


Sodium     (136-146)  mmol/L


 


Potassium     (3.5-5.1)  mmol/L


 


Chloride     ()  mmol/L


 


Carbon Dioxide     (21-31)  mmol/L


 


BUN     (6.0-23.0)  mg/dL


 


Creatinine     (0.6-1.5)  mg/dL


 


Est Cr Clr Drug Dosing     mL/min


 


Estimated GFR (MDRD)     ml/min


 


Glucose     ()  mg/dL


 


Calcium     (8.8-10.8)  mg/dL


 


Iron    18 L  ()  ug/dL


 


Total Bilirubin     (0.1-1.5)  mg/dL


 


AST     (5-40)  IU/L


 


ALT     (8-54)  IU/L


 


Alkaline Phosphatase     ()  


 


C-Reactive Protein     (0.0-0.5)  mg/dL


 


Total Protein     (6.0-8.0)  g/dL


 


Albumin     (3.4-4.8)  g/dL


 


Globulin     (2.0-3.5)  g/dL


 


Albumin/Globulin Ratio     (1.3-2.8)  


 


Amylase     (10-90)  U/L


 


Lipase     (7-80)  U/L


 


Acetaminophen  3.6    ug/mL














  09/01/17 Range/Units





  13:00 


 


WBC   (4.0-11.0)  K/uL


 


RBC   (4.30-5.90)  M/uL


 


Hgb   (12.0-16.0)  g/dL


 


Hct   (36.0-46.0)  %


 


MCV   (80.0-98.0)  fL


 


MCH   (27.0-32.0)  pg


 


MCHC   (31.0-37.0)  g/dL


 


RDW Std Deviation   (28.0-62.0)  fl


 


RDW Coeff of Rai   (11.0-15.0)  %


 


Plt Count   (150-400)  K/uL


 


MPV   (7.40-12.00)  fL


 


Neut % (Auto)   (48.0-80.0)  %


 


Lymph % (Auto)   (16.0-40.0)  %


 


Mono % (Auto)   (0.0-15.0)  %


 


Eos % (Auto)   (0.0-7.0)  %


 


Baso % (Auto)   (0.0-1.5)  %


 


Neut # (Auto)   (1.4-5.7)  K/uL


 


Lymph # (Auto)   (0.6-2.4)  K/uL


 


Mono # (Auto)   (0.0-0.8)  K/uL


 


Eos # (Auto)   (0.0-0.7)  K/uL


 


Baso # (Auto)   (0.0-0.1)  K/uL


 


Nucleated RBC %   /100WBC


 


Nucleated RBCs #   K/uL


 


ESR   (0-29)  mm/hr


 


INR   (0.86-1.11)  


 


Lactate   (0.20-2.00)  mmol/L


 


Sodium   (136-146)  mmol/L


 


Potassium   (3.5-5.1)  mmol/L


 


Chloride   ()  mmol/L


 


Carbon Dioxide   (21-31)  mmol/L


 


BUN   (6.0-23.0)  mg/dL


 


Creatinine   (0.6-1.5)  mg/dL


 


Est Cr Clr Drug Dosing   mL/min


 


Estimated GFR (MDRD)   ml/min


 


Glucose   ()  mg/dL


 


Calcium   (8.8-10.8)  mg/dL


 


Iron   ()  ug/dL


 


Total Bilirubin   (0.1-1.5)  mg/dL


 


AST   (5-40)  IU/L


 


ALT   (8-54)  IU/L


 


Alkaline Phosphatase   ()  


 


C-Reactive Protein   (0.0-0.5)  mg/dL


 


Total Protein   (6.0-8.0)  g/dL


 


Albumin   (3.4-4.8)  g/dL


 


Globulin   (2.0-3.5)  g/dL


 


Albumin/Globulin Ratio   (1.3-2.8)  


 


Amylase  26  (10-90)  U/L


 


Lipase  22  (7-80)  U/L


 


Acetaminophen   ug/mL











Result Diagrams: 


 09/03/17 05:24





 09/03/17 05:24


Jose J Results Last 24 hrs: 


 Microbiology











 09/01/17 13:36 Anaerobic Blood Culture - Final





 Blood - Venous - Lab Draw 














*Q Meaningful Use (ADM)





- VTE *Q


VTE Criteria *Q: 








- Stroke *Q


Stroke Criteria *Q: 








- AMI *Q


AMI Criteria *Q: 





Problem List Initiated/Reviewed/Updated: Yes


Orders Last 24hrs: 


 Active Orders 24 hr











 Category Date Time Status


 


 Admission Status [Patient Status] [ADT] Stat ADT  09/01/17 14:44 Active


 


 Activity as Tolerated [RC] .Routine Care  09/01/17 15:11 Active


 


 Antiembolic Devices [RC] PER UNIT ROUTINE Care  09/01/17 15:15 Active


 


 Dressing Change [Wound Care] [RC] ASDIRECTED Care  09/01/17 15:11 Active


 


 Intake and Output [RC] ASDIRECTED Care  09/01/17 15:10 Active


 


 May Shower [RC] ASDIRECTED Care  09/01/17 15:11 Active


 


 Neurovascular Check [RC] Q8HR Care  09/01/17 15:10 Active


 


 Notify Provider Consults [RC] ASDIRECTED Care  09/01/17 15:15 Active


 


 Notify Provider Vital Signs [RC] ASDIRECTED Care  09/01/17 15:11 Active


 


 Vital Signs [RC] Q8HR Care  09/01/17 15:10 Active


 


 Consult to Physician [CONS] Routine Cons  09/01/17 15:10 Active


 


 Regular Diet [DIET] Diet  09/01/17 Dinner Active


 


 COMPREHENSIVE METABOLIC PN,CMP [CHEM] AM Lab  09/02/17 05:11 Ordered


 


 COMPREHENSIVE METABOLIC PN,CMP [CHEM] AM Lab  09/03/17 05:11 Ordered


 


 COMPREHENSIVE METABOLIC PN,CMP [CHEM] AM Lab  09/04/17 05:11 Ordered


 


 CULTURE BLOOD [BC] Stat Lab  09/01/17 13:00 Received


 


 CULTURE BLOOD [BC] Stat Lab  09/01/17 13:36 Results


 


 HEPATITIS PANEL, ACUTE [REF] Stat Lab  09/01/17 14:48 Ordered


 


 DAPTOmycin [Cubicin] 500 mg Med  09/01/17 17:00 Active





 Sodium Chloride 0.9% [Normal Saline] 10 ml   





 IV Q24H   


 


 Enoxaparin [Lovenox] Med  09/01/17 15:30 Active





 30 mg SUBCUT Q24H   


 


 HYDROmorphone [Dilaudid] Med  09/01/17 15:14 Active





 0.5 - 1 mg IVPUSH Q3H PRN   


 


 Ketorolac [Toradol] Med  09/01/17 15:14 Active





 15 mg IVPUSH Q6H PRN   


 


 Lactated Ringers [Ringers, Lactated] 1,000 ml Med  09/01/17 15:15 Active





 IV ASDIRECTED   


 


 Levothyroxine [Synthroid] Med  09/02/17 07:30 Active





 88 mcg PO ACBREAKFAST   


 


 Omeprazole Med  09/02/17 07:30 Active





 20 mg PO ACBRK   


 


 Ondansetron [Zofran] Med  09/01/17 15:14 Active





 4 mg IV Q8HR PRN   


 


 Promethazine [Phenergan] Med  09/01/17 15:16 Active





 25 mg PO Q6H PRN   


 


 Scopolamine [Transderm-Scop] Med  09/01/17 15:15 Active





 1.5 mg TRDERM Q72H   


 


 Sodium Chloride 0.9% [Normal Saline] 1,000 ml Med  09/01/17 12:45 Active





 IV ASDIRECTED   


 


 amLODIPine [Norvasc] Med  09/02/17 09:00 Active





 10 mg PO DAILY   


 


 traMADol [Ultram] Med  09/01/17 15:23 Active





 100 mg PO Q6H PRN   


 


 Blood Culture x2 Reflex Set [OM.PC] Stat Oth  09/01/17 12:45 Ordered


 


 Sequential Compression Device [OM.PC] Routine Oth  09/01/17 15:10 Ordered








 Medication Orders





Amlodipine Besylate (Norvasc)  10 mg PO DAILY [facility]


Enoxaparin Sodium (Lovenox)  30 mg SUBCUT Q24H [facility]


   Last Admin: 09/01/17 15:58  Dose: 30 mg


Hydromorphone HCl (Dilaudid)  0.5 - 1 mg IVPUSH Q3H PRN


   PRN Reason: Pain


Sodium Chloride (Normal Saline)  1,000 mls @ 999 mls/hr IV ASDIRECTED [facility]


   Last Admin: 09/01/17 13:03  Dose: 999 mls/hr


Lactated Ringer's (Ringers, Lactated)  1,000 mls @ 125 mls/hr IV ASDIRECTED [facility]


   Last Admin: 09/01/17 15:59  Dose: 125 mls/hr


Daptomycin 500 mg/ Sodium (Chloride)  10 mls @ 300 mls/hr IV Q24H [facility]


Ketorolac Tromethamine (Toradol)  15 mg IVPUSH Q6H PRN


   PRN Reason: Pain


Levothyroxine Sodium (Synthroid)  88 mcg PO ACBREAKFAST [facility]


Omeprazole (Omeprazole)  20 mg PO ACBRK [facility]


Ondansetron HCl (Zofran)  4 mg IV Q8HR PRN


   PRN Reason: NAUSEA/VOMITING


Promethazine HCl (Phenergan)  25 mg PO Q6H PRN


   PRN Reason: Nausea/Vomiting


Scopolamine (Transderm-Scop)  1.5 mg TRDERM Q72H [facility]


   Stop: 09/04/17 15:16


   Last Admin: 09/01/17 15:58  Dose: 1.5 mg


Tramadol HCl (Ultram)  100 mg PO Q6H PRN


   PRN Reason: Breakthrough Pain








Assessment/Plan Comment:: 


82 yo female with joint prosthesis infection.  Patient is being treat with 

daptomycin


Elevated LFTs: would stop Tylenol. no evidence of acute Tylenol poisoning.  INR 

normal, no encephalopathy.  Acute viral hepatitis panel ordered,  liver 

ultrasound pending,  will continue to trend hepatic enzymes.

## 2017-09-01 NOTE — CR
EXAMINATION:  Fluoro and ultrasound guided left-sided PICC line placement.

 

HISTORY: Long-term antibiotics.

 

TECHNIQUE/FINDINGS:  After written informed consent was obtained from the patient using ultrasound an
d Fluoro guidance under aseptic conditions utilizing 1% lidocaine as local anesthesia left basilic ve
in was accessed and a dual lumen 5 [place] PICC catheter was deployed with its tip in the distal super
ior vena cava. The catheter flushes and withdraws blood well. The catheter is  flushed with the dilut
ed heparin. The catheter secured well.

 

 

IMPRESSION: Successful Fluoro and ultrasound guided PICC line placement.

## 2017-09-01 NOTE — DISCH
DATE OF DISCHARGE:

08/31/2017

 

PRIMARY CARE PHYSICIAN:

Mary Carmen Pope MD

 

ADMITTING DIAGNOSES:

1. Infection of total joint prosthesis.

2. Wound infection after surgery.

 

OTHER MEDICAL DIAGNOSES:

1. CREST syndrome.

2. Hypothyroidism.

 

DISCHARGE DIAGNOSES:

1. Infection of total joint prosthesis.

2. Wound infection after surgery.

3. CREST syndrome.

4. Hypothyroidism.

 

BRIEF HISTORY:

Agata Sebastian is an 81-year-old female, who underwent a total knee arthroplasty in

December of 2014.  She has had persistent drainage from the medial aspect of her

wound since April of 2017.  She did undergo irrigation and debridement of this 
in April of 2016. At that

time wound culture was + MSSA. Her wound eventually healed and she did well 
until recently.

She was healing well until approximately 2 weeks ago when she experienced

persistent drainage.  She was examined in the clinic on Monday, August 28th.

She was then admitted for IV antibiotics.

 

OPERATION:

None.

 

HOSPITAL COURSE:

Pain was controlled via p.o. pain medications.  She did receive 500 mg of

Cubicin IV daily.  Upon discharge, her vital signs were stable, she was

afebrile. Her WBC was WNL. Blood cultures remained negative. Wound culture was 

positive for MSSA.

 

DISCHARGE MEDICATIONS:

1. Cubicin  mg.  [facility] will [facility].

 

DISCHARGE INSTRUCTIONS:

She will follow up in the clinic as scheduled.

 

For a complete medication reconciliation and discharge instructions, please

refer to the patient's EHR.  If the patient has questions or concerns prior to

followup, she has been advised to call the clinic.

 

 

KEILA DIALLO

DD:  08/31/2017 15:29:25

DT:  09/01/2017 06:42:14

Job #:  [id number]

MONTSERRAT

## 2017-09-01 NOTE — EDM.PDOC
ED HPI GENERAL MEDICAL PROBLEM





- General


Chief Complaint: General


Stated Complaint: INFECTED KNEE


Time Seen by Provider: 09/01/17 12:42


Source of Information: Reports: Patient





- History of Present Illness


INITIAL COMMENTS - FREE TEXT/NARRATIVE: 





HISTORY AND PHYSICAL:





History of present illness:


Patient is an 89-year-old female that's brought to the emergency room with 

complaints of muscle aches and body pain. He was recently admitted to the 

hospital for a right septic knee and was treated with IV antibiotics and knee 

tap and drainage of excess fluid. Patient was discharged a day ago and kept on 

Cubicin through her PICC line. Since being discharged she has had increased 

body aches and pains. Reports that she's had thick purulent drainage from the 

right knee. Concerned that she is unable to move the right leg or bear weight 

as it causes too much pain.


History of a total right knee replacement approximately 2 years ago. States she 

plans on following up in [place] for removal and replacement of hardware. 





Review of systems: 


As per history of present illness and below otherwise all systems reviewed and 

negative.





Past medical history: 


As per history of present illness and as reviewed below otherwise 

noncontributory.





Surgical history: 


As per history of present illness and as reviewed below otherwise 

noncontributory.





Social history: 


No reported history of drug or alcohol abuse.





Family history: 


As per history of present illness and as reviewed below otherwise 

noncontributory.





Physical exam:


Gen.: Nontoxic appearing 81-year-old female, able to speak in full sentences 

without shortness of breath, alert and oriented 3.


HEENT: Atraumatic, normocephalic, pupils reactive, negative for conjunctival 

pallor or scleral icterus, mucous membranes moist, throat clear, neck supple, 

nontender, trachea midline.


Lungs: Clear to auscultation, breath sounds equal bilaterally, chest nontender.


Heart: S1S2, regular, negative for clicks, rubs, or JVD.


Abdomen: Soft, nondistended, nontender. Negative for masses or 

hepatosplenomegaly. Negative for costovertebral tenderness.


Pelvis: Stable nontender.


Genitourinary: Deferred.


Rectal: Deferred.


Skin: Open drainage site to right medial knee, just draining copious amounts of 

green purulent drainage. Skin is warm to touch. No erythema noted


Extremities: Atraumatic, negative for cords or calf pain. Neurovascular 

unremarkable.


Neuro: Awake, alert, oriented. Cranial nerves II through XII unremarkable. 

Cerebellum unremarkable. Motor and sensory unremarkable throughout. Exam 

nonfocal.








1245- Dr. Mary Carmen Pope was consulted 





Diagnostics:


CBC, CMP, lactic, ESR, CRP, chest x-ray, right knee x-ray





Therapeutics:


IV fluid, Toradol, Zofran, Dilaudid as needed





Impression: 


1. Septic arthritis





Definitive disposition and diagnosis as appropriate pending reevaluation and 

review of above.


  ** Right Knee


Pain Score (Numeric/FACES): 10





- Related Data


 Allergies











Allergy/AdvReac Type Severity Reaction Status Date / Time


 


codeine Allergy  Vomiting Verified 09/01/17 12:10


 


latex Allergy  Rash Verified 09/01/17 12:10


 


morphine Allergy  Difficulty Verified 09/01/17 12:10





   Breathing  


 


oxycodone HCl [From Percocet] Allergy  Vomiting Verified 09/01/17 12:10


 


Penicillins Allergy  Anaphylactic Verified 09/01/17 12:10





   Shock  


 


Sulfa (Sulfonamide Allergy  Anaphylactic Verified 09/01/17 12:10





Antibiotics)   Shock  


 


all pain meds Allergy  Nausea Uncoded 07/17/17 07:03











Home Meds: 


 Home Meds





amLODIPine Besylate [Amlodipine Besylate] 10 mg PO DAILY 12/26/14 [History]


Omeprazole Magnesium [Prilosec Otc] 20 mg PO ACBRK 12/30/14 [History]


Promethazine [Phenergan] 25 mg PO Q6H PRN #30 tablet 01/02/15 [Rx]


Denosumab [Prolia] 60 mg IM Q180D 07/28/16 [History]


Levothyroxine [Synthroid] 88 mcg PO ACBREAKFAST 07/28/16 [History]


Ondansetron HCl [Zofran] 4 mg PO Q8HR PRN #16 tablet 07/17/17 [Rx]


traMADol [Ultram] 100 mg PO Q6H PRN #80 tablet 07/22/17 [Rx]


Acetaminophen [Tylenol Extra Strength] 1,000 mg PO Q6H PRN  tablet 08/31/17 [Rx]











Past Medical History





- Past Health History


Medical/Surgical History: Denies Medical/Surgical History


HEENT History: Reports: Cataract


Other HEENT History: has upper denture and lower partial


Cardiovascular History: Reports: None


Respiratory History: Reports: None


Gastrointestinal History: Reports: GERD, Irritable Bowel Syndrome, Other (See 

Below)


Other Gastrointestinal History: IBS-Diarrhea


Genitourinary History: Reports: Other (See Below)


Other Genitourinary History: overactive bladder


OB/GYN History: Reports: Pregnancy


Musculoskeletal History: Reports: Arthritis


Neurological History: Reports: Other (See Below)


Other Neuro History: hx of motion sickness


Psychiatric History: Reports: None


Endocrine/Metabolic History: Reports: Hypothyroidism


Hematologic History: Reports: None


Immunologic History: Reports: Other (See Below)


Other Immunologic History: CREST syndrome


Oncologic (Cancer) History: Reports: None


Dermatologic History: Reports: None, Other (See Below)


Other Dermatologic History: Raynaud's Syndrome





- Infectious Disease History


Infectious Disease History: Reports: Chicken Pox, Measles, Mumps, Shingles





- Past Surgical History


Head Surgeries/Procedures: Reports: None


HEENT Surgical History: Reports: Cataract Surgery


Female  Surgical History: Reports: Hysterectomy


Musculoskeletal Surgical History: Reports: Knee Replacement, ORIF, Shoulder 

Surgery





Social & Family History





- Family History


Family Medical History: Noncontributory


HEENT: Reports: Cataract


Respiratory: Reports: COPD


Musculoskeletal: Reports: Arthritis


Psychiatric: Reports: Depression


Endocrine/Metabolic: Reports: Diabetes, type II





- Tobacco Use


Smoking Status *Q: Never Smoker


Years of Tobacco use: 10


Used Tobacco, but Quit: No


Second Hand Smoke Exposure: No





- Caffeine Use


Caffeine Use: Reports: Coffee





- Alcohol Use


Days Per Week of Alcohol Use: 0


Number of Drinks Per Day: 0


Total Drinks Per Week: 0





- Recreational Drug Use


Recreational Drug Use: No


Drug Use in Last 12 Months: No





ED ROS GENERAL





- Review of Systems


Review Of Systems: ROS reveals no pertinent complaints other than HPI.





ED EXAM, GENERAL





- Physical Exam


Exam: See Below (See dictation)





Course





- Vital Signs


Last Recorded V/S: 


 Last Vital Signs











Temp  37.2 C   09/01/17 20:00


 


Pulse  99   09/01/17 20:00


 


Resp  18   09/01/17 20:00


 


BP  128/86   09/01/17 20:00


 


Pulse Ox  91 L  09/01/17 20:00














- Orders/Labs/Meds


Orders: 


 Active Orders 24 hr











 Category Date Time Status


 


 Admission Status [Patient Status] [ADT] Stat ADT  09/01/17 14:44 Active


 


 Activity as Tolerated [RC] .Routine Care  09/01/17 15:11 Active


 


 Dressing Change [Wound Care] [RC] BID Care  09/01/17 15:11 Active


 


 Intake and Output [RC] ASDIRECTED Care  09/01/17 15:10 Active


 


 May Shower [RC] ASDIRECTED Care  09/01/17 15:11 Active


 


 Neurovascular Check [RC] Q8HR Care  09/01/17 15:10 Active


 


 Notify Provider Vital Signs [RC] ASDIRECTED Care  09/01/17 15:11 Active


 


 Vital Signs [RC] Q4H Care  09/01/17 15:10 Active


 


 Consult to Physician [CONS] Routine Cons  09/01/17 15:10 Active


 


 Regular Diet [DIET] Diet  09/01/17 Dinner Active


 


 COMPREHENSIVE METABOLIC PN,CMP [CHEM] AM Lab  09/02/17 05:11 Ordered


 


 COMPREHENSIVE METABOLIC PN,CMP [CHEM] AM Lab  09/03/17 05:11 Ordered


 


 COMPREHENSIVE METABOLIC PN,CMP [CHEM] AM Lab  09/04/17 05:11 Ordered


 


 CULTURE BLOOD [BC] Stat Lab  09/01/17 13:00 Received


 


 CULTURE BLOOD [BC] Stat Lab  09/01/17 13:36 Results


 


 HEPATITIS PANEL, ACUTE [REF] Stat Lab  09/01/17 14:48 Ordered


 


 HYDROmorphone [Dilaudid] Med  09/01/17 15:14 Active





 0.5 - 1 mg IVPUSH Q3H PRN   


 


 Ketorolac [Toradol] Med  09/01/17 15:14 Active





 15 mg IVPUSH Q6H PRN   


 


 Lactated Ringers [Ringers, Lactated] 1,000 ml Med  09/01/17 15:15 Active





 IV ASDIRECTED   


 


 Ondansetron [Zofran] Med  09/01/17 15:14 Active





 4 mg IV Q8HR PRN   


 


 Scopolamine [Transderm-Scop] Med  09/01/17 15:15 Active





 1.5 mg TRDERM Q72H   


 


 Sodium Chloride 0.9% [Normal Saline] 1,000 ml Med  09/01/17 12:45 Active





 IV ASDIRECTED   


 


 Blood Culture x2 Reflex Set [OM.PC] Stat Oth  09/01/17 12:45 Ordered


 


 Sequential Compression Device [OM.PC] Routine Oth  09/01/17 15:10 Ordered








 Medication Orders





Amlodipine Besylate (Norvasc)  10 mg PO DAILY [facility]AN[facility]


Enoxaparin Sodium (Lovenox)  30 mg SUBCUT Q24H [facility]ANSA


   Last Admin: 09/01/17 15:58  Dose: 30 mg


Hydromorphone HCl (Dilaudid)  0.5 - 1 mg IVPUSH Q3H PRN


   PRN Reason: Pain


   Last Admin: 09/01/17 18:47  Dose: 1 mg


Sodium Chloride (Normal Saline)  1,000 mls @ 999 mls/hr IV ASDIRECTED [facility]


   Last Admin: 09/01/17 13:03  Dose: 999 mls/hr


Lactated Ringer's (Ringers, Lactated)  1,000 mls @ 125 mls/hr IV ASDIRECTED [facility]


   Last Admin: 09/01/17 15:59  Dose: 125 mls/hr


Daptomycin 500 mg/ Sodium (Chloride)  10 mls @ 300 mls/hr IV Q24H [facility]


   Last Admin: 09/01/17 16:14  Dose: 300 mls/hr


Ketorolac Tromethamine (Toradol)  15 mg IVPUSH Q6H PRN


   PRN Reason: Pain


   Last Admin: 09/01/17 17:14  Dose: 15 mg


Levothyroxine Sodium (Synthroid)  88 mcg PO ACBREAKFAST [facility]ANSA


Omeprazole (Omeprazole)  20 mg PO ACBRK [facility]ANSA


Ondansetron HCl (Zofran)  4 mg IV Q8HR PRN


   PRN Reason: NAUSEA/VOMITING


Promethazine HCl (Phenergan)  25 mg PO Q6H PRN


   PRN Reason: Nausea/Vomiting


   Last Admin: 09/01/17 20:28  Dose: 25 mg


Scopolamine (Transderm-Scop)  1.5 mg TRDERM Q72H [facility]


   Stop: 09/04/17 15:16


   Last Admin: 09/01/17 15:58  Dose: 1.5 mg


Tramadol HCl (Ultram)  100 mg PO Q6H PRN


   PRN Reason: Breakthrough Pain


   Last Admin: 09/01/17 16:13  Dose: 100 mg








Labs: 


 Laboratory Tests











  09/01/17 09/01/17 09/01/17 Range/Units





  13:00 13:00 13:00 


 


WBC  12.20 H    (4.0-11.0)  K/uL


 


RBC  3.52 L    (4.30-5.90)  M/uL


 


Hgb  9.8 L    (12.0-16.0)  g/dL


 


Hct  30.9 L    (36.0-46.0)  %


 


MCV  87.8    (80.0-98.0)  fL


 


MCH  27.8    (27.0-32.0)  pg


 


MCHC  31.7    (31.0-37.0)  g/dL


 


RDW Std Deviation  47.7    (28.0-62.0)  fl


 


RDW Coeff of Rai  15    (11.0-15.0)  %


 


Plt Count  492 H    (150-400)  K/uL


 


MPV  9.70    (7.40-12.00)  fL


 


Neut % (Auto)  84.7 H    (48.0-80.0)  %


 


Lymph % (Auto)  9.3 L    (16.0-40.0)  %


 


Mono % (Auto)  5.3    (0.0-15.0)  %


 


Eos % (Auto)  0.5    (0.0-7.0)  %


 


Baso % (Auto)  0.2    (0.0-1.5)  %


 


Neut # (Auto)  10.3 H    (1.4-5.7)  K/uL


 


Lymph # (Auto)  1.1    (0.6-2.4)  K/uL


 


Mono # (Auto)  0.7    (0.0-0.8)  K/uL


 


Eos # (Auto)  0.1    (0.0-0.7)  K/uL


 


Baso # (Auto)  0.0    (0.0-0.1)  K/uL


 


Nucleated RBC %  0.0    /100WBC


 


Nucleated RBCs #  0    K/uL


 


ESR  78 H    (0-29)  mm/hr


 


INR     (0.86-1.11)  


 


Lactate   1.0   (0.20-2.00)  mmol/L


 


Sodium    138  (136-146)  mmol/L


 


Potassium    3.9  (3.5-5.1)  mmol/L


 


Chloride    102  ()  mmol/L


 


Carbon Dioxide    23  (21-31)  mmol/L


 


BUN    15  (6.0-23.0)  mg/dL


 


Creatinine    0.7  (0.6-1.5)  mg/dL


 


Est Cr Clr Drug Dosing    43.81  mL/min


 


Estimated GFR (MDRD)    > 60.0  ml/min


 


Glucose    95  ()  mg/dL


 


Calcium    9.1  (8.8-10.8)  mg/dL


 


Iron     ()  ug/dL


 


Total Bilirubin    0.2  (0.1-1.5)  mg/dL


 


AST    312 H  (5-40)  IU/L


 


ALT    115 H  (8-54)  IU/L


 


Alkaline Phosphatase    115  ()  


 


C-Reactive Protein    22.05 H  (0.0-0.5)  mg/dL


 


Total Protein    6.7  (6.0-8.0)  g/dL


 


Albumin    3.1 L  (3.4-4.8)  g/dL


 


Globulin    3.6 H  (2.0-3.5)  g/dL


 


Albumin/Globulin Ratio    0.9 L  (1.3-2.8)  


 


Amylase     (10-90)  U/L


 


Lipase     (7-80)  U/L


 


Acetaminophen     ug/mL














  09/01/17 09/01/17 09/01/17 Range/Units





  13:00 13:00 13:00 


 


WBC     (4.0-11.0)  K/uL


 


RBC     (4.30-5.90)  M/uL


 


Hgb     (12.0-16.0)  g/dL


 


Hct     (36.0-46.0)  %


 


MCV     (80.0-98.0)  fL


 


MCH     (27.0-32.0)  pg


 


MCHC     (31.0-37.0)  g/dL


 


RDW Std Deviation     (28.0-62.0)  fl


 


RDW Coeff of Rai     (11.0-15.0)  %


 


Plt Count     (150-400)  K/uL


 


MPV     (7.40-12.00)  fL


 


Neut % (Auto)     (48.0-80.0)  %


 


Lymph % (Auto)     (16.0-40.0)  %


 


Mono % (Auto)     (0.0-15.0)  %


 


Eos % (Auto)     (0.0-7.0)  %


 


Baso % (Auto)     (0.0-1.5)  %


 


Neut # (Auto)     (1.4-5.7)  K/uL


 


Lymph # (Auto)     (0.6-2.4)  K/uL


 


Mono # (Auto)     (0.0-0.8)  K/uL


 


Eos # (Auto)     (0.0-0.7)  K/uL


 


Baso # (Auto)     (0.0-0.1)  K/uL


 


Nucleated RBC %     /100WBC


 


Nucleated RBCs #     K/uL


 


ESR     (0-29)  mm/hr


 


INR   1.08   (0.86-1.11)  


 


Lactate     (0.20-2.00)  mmol/L


 


Sodium     (136-146)  mmol/L


 


Potassium     (3.5-5.1)  mmol/L


 


Chloride     ()  mmol/L


 


Carbon Dioxide     (21-31)  mmol/L


 


BUN     (6.0-23.0)  mg/dL


 


Creatinine     (0.6-1.5)  mg/dL


 


Est Cr Clr Drug Dosing     mL/min


 


Estimated GFR (MDRD)     ml/min


 


Glucose     ()  mg/dL


 


Calcium     (8.8-10.8)  mg/dL


 


Iron    18 L  ()  ug/dL


 


Total Bilirubin     (0.1-1.5)  mg/dL


 


AST     (5-40)  IU/L


 


ALT     (8-54)  IU/L


 


Alkaline Phosphatase     ()  


 


C-Reactive Protein     (0.0-0.5)  mg/dL


 


Total Protein     (6.0-8.0)  g/dL


 


Albumin     (3.4-4.8)  g/dL


 


Globulin     (2.0-3.5)  g/dL


 


Albumin/Globulin Ratio     (1.3-2.8)  


 


Amylase     (10-90)  U/L


 


Lipase     (7-80)  U/L


 


Acetaminophen  3.6    ug/mL














  09/01/17 Range/Units





  13:00 


 


WBC   (4.0-11.0)  K/uL


 


RBC   (4.30-5.90)  M/uL


 


Hgb   (12.0-16.0)  g/dL


 


Hct   (36.0-46.0)  %


 


MCV   (80.0-98.0)  fL


 


MCH   (27.0-32.0)  pg


 


MCHC   (31.0-37.0)  g/dL


 


RDW Std Deviation   (28.0-62.0)  fl


 


RDW Coeff of Rai   (11.0-15.0)  %


 


Plt Count   (150-400)  K/uL


 


MPV   (7.40-12.00)  fL


 


Neut % (Auto)   (48.0-80.0)  %


 


Lymph % (Auto)   (16.0-40.0)  %


 


Mono % (Auto)   (0.0-15.0)  %


 


Eos % (Auto)   (0.0-7.0)  %


 


Baso % (Auto)   (0.0-1.5)  %


 


Neut # (Auto)   (1.4-5.7)  K/uL


 


Lymph # (Auto)   (0.6-2.4)  K/uL


 


Mono # (Auto)   (0.0-0.8)  K/uL


 


Eos # (Auto)   (0.0-0.7)  K/uL


 


Baso # (Auto)   (0.0-0.1)  K/uL


 


Nucleated RBC %   /100WBC


 


Nucleated RBCs #   K/uL


 


ESR   (0-29)  mm/hr


 


INR   (0.86-1.11)  


 


Lactate   (0.20-2.00)  mmol/L


 


Sodium   (136-146)  mmol/L


 


Potassium   (3.5-5.1)  mmol/L


 


Chloride   ()  mmol/L


 


Carbon Dioxide   (21-31)  mmol/L


 


BUN   (6.0-23.0)  mg/dL


 


Creatinine   (0.6-1.5)  mg/dL


 


Est Cr Clr Drug Dosing   mL/min


 


Estimated GFR (MDRD)   ml/min


 


Glucose   ()  mg/dL


 


Calcium   (8.8-10.8)  mg/dL


 


Iron   ()  ug/dL


 


Total Bilirubin   (0.1-1.5)  mg/dL


 


AST   (5-40)  IU/L


 


ALT   (8-54)  IU/L


 


Alkaline Phosphatase   ()  


 


C-Reactive Protein   (0.0-0.5)  mg/dL


 


Total Protein   (6.0-8.0)  g/dL


 


Albumin   (3.4-4.8)  g/dL


 


Globulin   (2.0-3.5)  g/dL


 


Albumin/Globulin Ratio   (1.3-2.8)  


 


Amylase  26  (10-90)  U/L


 


Lipase  22  (7-80)  U/L


 


Acetaminophen   ug/mL











Meds: 


Medications











Generic Name Dose Route Start Last Admin





  Trade Name Freq  PRN Reason Stop Dose Admin


 


Amlodipine Besylate  10 mg  09/02/17 09:00  





  Norvasc  PO   





  DAILY HANSA   


 


Enoxaparin Sodium  30 mg  09/01/17 15:30  09/01/17 15:58





  Lovenox  SUBCUT   30 mg





  Q24H HANSA   Administration


 


Hydromorphone HCl  0.5 - 1 mg  09/01/17 15:14  09/01/17 18:47





  Dilaudid  IVPUSH   1 mg





  Q3H PRN   Administration





  Pain   


 


Sodium Chloride  1,000 mls @ 999 mls/hr  09/01/17 12:45  09/01/17 13:03





  Normal Saline  IV   999 mls/hr





  ASDIRECTED [facility]ANSA   Administration


 


Lactated Ringer's  1,000 mls @ 125 mls/hr  09/01/17 15:15  09/01/17 15:59





  Ringers, Lactated  IV   125 mls/hr





  ASDIRECTED [facility]ANSA   Administration


 


Daptomycin 500 mg/ Sodium  10 mls @ 300 mls/hr  09/01/17 17:00  09/01/17 16:14





  Chloride  IV   300 mls/hr





  Q24H HANSA   Administration


 


Ketorolac Tromethamine  15 mg  09/01/17 15:14  09/01/17 17:14





  Toradol  IVPUSH   15 mg





  Q6H PRN   Administration





  Pain   


 


Levothyroxine Sodium  88 mcg  09/02/17 07:30  





  Synthroid  PO   





  ACBREAKFAST HANSA   


 


Omeprazole  20 mg  09/02/17 07:30  





  Omeprazole  PO   





  ACBRK HANSA   


 


Ondansetron HCl  4 mg  09/01/17 15:14  





  Zofran  IV   





  Q8HR PRN   





  NAUSEA/VOMITING   


 


Promethazine HCl  25 mg  09/01/17 15:16  09/01/17 20:28





  Phenergan  PO   25 mg





  Q6H PRN   Administration





  Nausea/Vomiting   


 


Scopolamine  1.5 mg  09/01/17 15:15  09/01/17 15:58





  Transderm-Scop  TRDERM  09/04/17 15:16  1.5 mg





  Q72H HANSA   Administration


 


Tramadol HCl  100 mg  09/01/17 15:23  09/01/17 16:13





  Ultram  PO   100 mg





  Q6H PRN   Administration





  Breakthrough Pain   














Discontinued Medications














Generic Name Dose Route Start Last Admin





  Trade Name Freq  PRN Reason Stop Dose Admin


 


Daptomycin  500 mg  09/01/17 17:00  





  Cubicin  IVPUSH   





  Q24H HANSA   


 


Hydromorphone HCl  0.5 mg  09/01/17 12:45  09/01/17 13:27





  Dilaudid  IV  09/01/17 12:46  0.5 mg





  ONETIME ONE   Administration


 


Hydromorphone HCl  0.5 mg  09/01/17 13:58  09/01/17 14:07





  Dilaudid  IV  09/01/17 13:59  0.5 mg





  ONETIME ONE   Administration


 


Ketorolac Tromethamine  30 mg  09/01/17 12:42  09/01/17 13:03





  Toradol  IVPUSH  09/01/17 12:43  30 mg





  ONETIME ONE   Administration


 


Ondansetron HCl  4 mg  09/01/17 12:42  09/01/17 13:03





  Zofran  IVPUSH  09/01/17 12:43  4 mg





  ONETIME ONE   Administration














Departure





- Departure


Time of Disposition: 22:01


Disposition: Admitted As Inpatient 67


Condition: Good


Clinical Impression: 


 Septic arthritis








- Discharge Information





- My Orders


Last 24 Hours: 


My Active Orders





09/01/17 12:45


Sodium Chloride 0.9% [Normal Saline] 1,000 ml IV ASDIRECTED 


Blood Culture x2 Reflex Set [OM.PC] Stat 





09/01/17 13:00


CULTURE BLOOD [BC] Stat 





09/01/17 13:36


CULTURE BLOOD [BC] Stat 














- Assessment/Plan


Last 24 Hours: 


My Active Orders





09/01/17 12:45


Sodium Chloride 0.9% [Normal Saline] 1,000 ml IV ASDIRECTED 


Blood Culture x2 Reflex Set [OM.PC] Stat 





09/01/17 13:00


CULTURE BLOOD [BC] Stat 





09/01/17 13:36


CULTURE BLOOD [BC] Stat

## 2017-09-01 NOTE — CR
EXAMINATION: Right knee

 

HISTORY: Septic knee

 

COMPARISON: 8/28/2017

 

TECHNIQUE: 2 views

 

FINDINGS: Total knee revision hardware changes are demonstrated. There is likely a trace suprapatella
r joint effusion with induration of Hoffa's fat pad. No fracture or acute osseous abnormality demonst
rated. Bone mineralization otherwise appears normal.

 

IMPRESSION: 

1. Small suprapatellar joint effusion and total knee replacement hardware without acute osseous findi
ng.

## 2017-09-02 LAB
CHLORIDE SERPL-SCNC: 101 MMOL/L (ref 98–110)
SODIUM SERPL-SCNC: 136 MMOL/L (ref 136–146)

## 2017-09-02 RX ADMIN — KETOROLAC TROMETHAMINE PRN MG: 15 INJECTION, SOLUTION INTRAMUSCULAR; INTRAVENOUS at 17:09

## 2017-09-02 RX ADMIN — OMEPRAZOLE SCH MG: 20 CAPSULE, DELAYED RELEASE ORAL at 06:50

## 2017-09-02 RX ADMIN — KETOROLAC TROMETHAMINE PRN MG: 15 INJECTION, SOLUTION INTRAMUSCULAR; INTRAVENOUS at 10:20

## 2017-09-02 RX ADMIN — CLINDAMYCIN PHOSPHATE SCH MLS/HR: 12 INJECTION, SOLUTION INTRAVENOUS at 20:14

## 2017-09-02 RX ADMIN — CLINDAMYCIN PHOSPHATE SCH MLS/HR: 12 INJECTION, SOLUTION INTRAVENOUS at 14:45

## 2017-09-02 RX ADMIN — KETOROLAC TROMETHAMINE PRN MG: 15 INJECTION, SOLUTION INTRAMUSCULAR; INTRAVENOUS at 04:10

## 2017-09-02 NOTE — PCM.PN
- Review of Systems


Systems Review Comment:: 





patient reports myalgias





- Patient Data


Vitals - Most Recent: 


 Last Vital Signs











Temp  36.4 C   09/02/17 08:00


 


Pulse  99   09/02/17 08:00


 


Resp  16   09/02/17 08:00


 


BP  127/84   09/02/17 08:17


 


Pulse Ox  92 L  09/02/17 08:00











Weight - Most Recent: 50.5 kg


I&O - Last 24 Hours: 


 Intake & Output











 09/01/17 09/02/17 09/02/17





 22:59 06:59 14:59


 


Intake Total  1550 


 


Output Total  1333 


 


Balance  217 











Lab Results Last 24 Hours: 


 Laboratory Results - last 24 hr











  09/02/17 09/02/17 Range/Units





  04:45 05:04 


 


WBC   15.46 H  (4.0-11.0)  K/uL


 


RBC   3.64 L  (4.30-5.90)  M/uL


 


Hgb   10.1 L  (12.0-16.0)  g/dL


 


Hct   32.4 L  (36.0-46.0)  %


 


MCV   89.0  (80.0-98.0)  fL


 


MCH   27.7  (27.0-32.0)  pg


 


MCHC   31.2  (31.0-37.0)  g/dL


 


RDW Std Deviation   48.2  (28.0-62.0)  fl


 


RDW Coeff of Rai   15  (11.0-15.0)  %


 


Plt Count   472 H  (150-400)  K/uL


 


MPV   9.80  (7.40-12.00)  fL


 


Neut % (Auto)   84.8 H  (48.0-80.0)  %


 


Lymph % (Auto)   9.3 L  (16.0-40.0)  %


 


Mono % (Auto)   3.7  (0.0-15.0)  %


 


Eos % (Auto)   2.0  (0.0-7.0)  %


 


Baso % (Auto)   0.2  (0.0-1.5)  %


 


Neut # (Auto)   13.1 H  (1.4-5.7)  K/uL


 


Lymph # (Auto)   1.4  (0.6-2.4)  K/uL


 


Mono # (Auto)   0.6  (0.0-0.8)  K/uL


 


Eos # (Auto)   0.3  (0.0-0.7)  K/uL


 


Baso # (Auto)   0.0  (0.0-0.1)  K/uL


 


Nucleated RBC %   0.0  /100WBC


 


Nucleated RBCs #   0  K/uL


 


Sodium  136   (136-146)  mmol/L


 


Potassium  3.9   (3.5-5.1)  mmol/L


 


Chloride  101   ()  mmol/L


 


Carbon Dioxide  25   (21-31)  mmol/L


 


BUN  9   (6.0-23.0)  mg/dL


 


Creatinine  0.6   (0.6-1.5)  mg/dL


 


Est Cr Clr Drug Dosing  59.15   mL/min


 


Estimated GFR (MDRD)  > 60.0   ml/min


 


Glucose  94   ()  mg/dL


 


Calcium  8.6 L   (8.8-10.8)  mg/dL


 


Total Bilirubin  0.2   (0.1-1.5)  mg/dL


 


AST  443 H   (5-40)  IU/L


 


ALT  175 H   (8-54)  IU/L


 


Alkaline Phosphatase  107   ()  


 


Total Protein  6.1   (6.0-8.0)  g/dL


 


Albumin  2.8 L   (3.4-4.8)  g/dL


 


Globulin  3.3   (2.0-3.5)  g/dL


 


Albumin/Globulin Ratio  0.9 L   (1.3-2.8)  











Med Orders - Current: 


 Current Medications





Amlodipine Besylate (Norvasc)  10 mg PO DAILY [facility]


   Last Admin: 09/02/17 08:17 Dose:  10 mg


Enoxaparin Sodium (Lovenox)  30 mg SUBCUT Q24H [facility]


   Last Admin: 09/01/17 15:58 Dose:  30 mg


Hydromorphone HCl (Dilaudid)  0.5 - 1 mg IVPUSH Q3H PRN


   PRN Reason: Pain


   Last Admin: 09/02/17 05:53 Dose:  1 mg


Sodium Chloride (Normal Saline)  1,000 mls @ 999 mls/hr IV ASDIRECTED [facility]


   Last Admin: 09/01/17 13:03 Dose:  999 mls/hr


Clindamycin Phosphate 600 mg/ (Premix)  50 mls @ 100 mls/hr IV Q6H [facility]


Ketorolac Tromethamine (Toradol)  15 mg IVPUSH Q6H PRN


   PRN Reason: Pain


   Last Admin: 09/02/17 10:20 Dose:  15 mg


Levothyroxine Sodium (Synthroid)  88 mcg PO ACBREAKFAST [facility]


   Last Admin: 09/02/17 06:50 Dose:  88 mcg


Omeprazole (Omeprazole)  20 mg PO ACBRK [facility]


   Last Admin: 09/02/17 06:50 Dose:  20 mg


Ondansetron HCl (Zofran)  4 mg IV Q8HR PRN


   PRN Reason: NAUSEA/VOMITING


Promethazine HCl (Phenergan)  25 mg PO Q6H PRN


   PRN Reason: Nausea/Vomiting


   Last Admin: 09/02/17 05:57 Dose:  25 mg


Tramadol HCl (Ultram)  100 mg PO Q6H PRN


   PRN Reason: Breakthrough Pain


   Last Admin: 09/02/17 08:32 Dose:  100 mg





Discontinued Medications





Daptomycin (Cubicin)  500 mg IVPUSH Q24H [facility]


Hydromorphone HCl (Dilaudid)  0.5 mg IV ONETIME ONE


   Stop: 09/01/17 12:46


   Last Admin: 09/01/17 13:27 Dose:  0.5 mg


Hydromorphone HCl (Dilaudid)  0.5 mg IV ONETIME ONE


   Stop: 09/01/17 13:59


   Last Admin: 09/01/17 14:07 Dose:  0.5 mg


Lactated Ringer's (Ringers, Lactated)  1,000 mls @ 125 mls/hr IV ASDIRECTED [facility]


   Last Admin: 09/02/17 00:16 Dose:  125 mls/hr


Daptomycin 500 mg/ Sodium (Chloride)  10 mls @ 300 mls/hr IV Q24H [facility]


   Last Admin: 09/01/17 16:14 Dose:  300 mls/hr


Ketorolac Tromethamine (Toradol)  30 mg IVPUSH ONETIME ONE


   Stop: 09/01/17 12:43


   Last Admin: 09/01/17 13:03 Dose:  30 mg


Ondansetron HCl (Zofran)  4 mg IVPUSH ONETIME ONE


   Stop: 09/01/17 12:43


   Last Admin: 09/01/17 13:03 Dose:  4 mg


Scopolamine (Transderm-Scop)  1.5 mg TRDERM Q72H [facility]


   Stop: 09/04/17 15:16


   Last Admin: 09/01/17 15:58 Dose:  1.5 mg











- Exam


General: Alert, Oriented


Lungs: Clear to Auscultation, Normal Respiratory Effort


Cardiovascular: Regular Rate, Regular Rhythm


GI/Abdominal Exam: Normal Bowel Sounds, Soft, Non-Tender, No Distention


Skin: Warm, Dry, Intact





- Problem List Review


Problem List Initiated/Reviewed/Updated: Yes





- Plan


Plan:: 


80 yo female admitted with right periprosthetic MSSA knee infection.





Knee infection:switched to clindamycin, awating repeat cultures


Elevated LFTs: may be due to acute illness, acute viral hepatitis panel pending

, ultrasound of liver was unremarkable

## 2017-09-02 NOTE — PCM.PN
- General Info


Date of Service: 09/02/17


Functional Status: Reports: Pain Controlled





- Review of Systems


General: Reports: No Symptoms


HEENT: Reports: No Symptoms


Pulmonary: Reports: No Symptoms


Cardiovascular: Reports: No Symptoms


Gastrointestinal: Reports: No Symptoms


Genitourinary: Reports: No Symptoms


Musculoskeletal: Reports: Other (c/o achiness in all muscles in body)


Skin: Reports: No Symptoms


Neurological: Reports: No Symptoms


Psychiatric: Reports: No Symptoms


Systems Review Comment:: 


Patient states she is feeling a little better today. She noted that her 

symptoms began again last night after receiving Cubicin. She is somewhat sleepy 

now, but recently received Phenergan. Feels the scopalomine patch is not 

helping. Has been OOB. Eating and drinking appropriately.








- Patient Data


Vitals - Most Recent: 


 Last Vital Signs











Temp  97.6 F   09/02/17 08:00


 


Pulse  99   09/02/17 08:00


 


Resp  16   09/02/17 08:00


 


BP  127/84   09/02/17 08:17


 


Pulse Ox  92 L  09/02/17 08:00











Weight - Most Recent: 50.5 kg


I&O - Last 24 Hours: 


 Intake & Output











 09/01/17 09/02/17 09/02/17





 22:59 06:59 14:59


 


Intake Total  1550 


 


Output Total  1333 


 


Balance  217 











Lab Results Last 24 Hours: 


 Laboratory Results - last 24 hr











  09/02/17 Range/Units





  04:45 


 


Sodium  136  (136-146)  mmol/L


 


Potassium  3.9  (3.5-5.1)  mmol/L


 


Chloride  101  ()  mmol/L


 


Carbon Dioxide  25  (21-31)  mmol/L


 


BUN  9  (6.0-23.0)  mg/dL


 


Creatinine  0.6  (0.6-1.5)  mg/dL


 


Est Cr Clr Drug Dosing  59.15  mL/min


 


Estimated GFR (MDRD)  > 60.0  ml/min


 


Glucose  94  ()  mg/dL


 


Calcium  8.6 L  (8.8-10.8)  mg/dL


 


Total Bilirubin  0.2  (0.1-1.5)  mg/dL


 


AST  443 H  (5-40)  IU/L


 


ALT  175 H  (8-54)  IU/L


 


Alkaline Phosphatase  107  ()  


 


Total Protein  6.1  (6.0-8.0)  g/dL


 


Albumin  2.8 L  (3.4-4.8)  g/dL


 


Globulin  3.3  (2.0-3.5)  g/dL


 


Albumin/Globulin Ratio  0.9 L  (1.3-2.8)  











Med Orders - Current: 


 Current Medications





Amlodipine Besylate (Norvasc)  10 mg PO DAILY [facility]


   Last Admin: 09/02/17 08:17 Dose:  10 mg


Enoxaparin Sodium (Lovenox)  30 mg SUBCUT Q24H [facility]


   Last Admin: 09/01/17 15:58 Dose:  30 mg


Hydromorphone HCl (Dilaudid)  0.5 - 1 mg IVPUSH Q3H PRN


   PRN Reason: Pain


   Last Admin: 09/02/17 05:53 Dose:  1 mg


Sodium Chloride (Normal Saline)  1,000 mls @ 999 mls/hr IV ASDIRECTED [facility]


   Last Admin: 09/01/17 13:03 Dose:  999 mls/hr


Lactated Ringer's (Ringers, Lactated)  1,000 mls @ 125 mls/hr IV ASDIRECTED [facility]


   Last Admin: 09/02/17 00:16 Dose:  125 mls/hr


Daptomycin 500 mg/ Sodium (Chloride)  10 mls @ 300 mls/hr IV Q24H [facility]


   Last Admin: 09/01/17 16:14 Dose:  300 mls/hr


Ketorolac Tromethamine (Toradol)  15 mg IVPUSH Q6H PRN


   PRN Reason: Pain


   Last Admin: 09/02/17 04:10 Dose:  15 mg


Levothyroxine Sodium (Synthroid)  88 mcg PO ACBREAKFAST [facility]


   Last Admin: 09/02/17 06:50 Dose:  88 mcg


Omeprazole (Omeprazole)  20 mg PO ACBRK [facility]


   Last Admin: 09/02/17 06:50 Dose:  20 mg


Ondansetron HCl (Zofran)  4 mg IV Q8HR PRN


   PRN Reason: NAUSEA/VOMITING


Promethazine HCl (Phenergan)  25 mg PO Q6H PRN


   PRN Reason: Nausea/Vomiting


   Last Admin: 09/02/17 05:57 Dose:  25 mg


Scopolamine (Transderm-Scop)  1.5 mg TRDERM Q72H [facility]


   Stop: 09/04/17 15:16


   Last Admin: 09/01/17 15:58 Dose:  1.5 mg


Tramadol HCl (Ultram)  100 mg PO Q6H PRN


   PRN Reason: Breakthrough Pain


   Last Admin: 09/02/17 08:32 Dose:  100 mg





Discontinued Medications





Daptomycin (Cubicin)  500 mg IVPUSH Q24H [facility]


Hydromorphone HCl (Dilaudid)  0.5 mg IV ONETIME ONE


   Stop: 09/01/17 12:46


   Last Admin: 09/01/17 13:27 Dose:  0.5 mg


Hydromorphone HCl (Dilaudid)  0.5 mg IV ONETIME ONE


   Stop: 09/01/17 13:59


   Last Admin: 09/01/17 14:07 Dose:  0.5 mg


Ketorolac Tromethamine (Toradol)  30 mg IVPUSH ONETIME ONE


   Stop: 09/01/17 12:43


   Last Admin: 09/01/17 13:03 Dose:  30 mg


Ondansetron HCl (Zofran)  4 mg IVPUSH ONETIME ONE


   Stop: 09/01/17 12:43


   Last Admin: 09/01/17 13:03 Dose:  4 mg











- Exam


General: Alert, Oriented


Neurological: No New Focal Deficit


Psy/Mental Status: Alert, Normal Affect, Normal Mood


Physical Findings Comments:: 


Exam of RLE show no significant joint effusion. Medial wound persists. No 

surrounding erythema/induration. ROM 0-90 degrees without pain. No calf TTP. AT/

EHL/gastroc 5/5. Sensation intact. DP 2+.








- Problem List & Annotations


(1) Infection of total joint prosthesis


SNOMED Code(s): 588028869


   Code(s): T84.50XA - INFECT/INFLM REACTION DUE TO UNSP INT JOINT PROSTH, INIT

   Status: Acute   Current Visit: No   


Qualifiers: 


   Encounter type: subsequent encounter   Qualified Code(s): T84.50XD - 

Infection and inflammatory reaction due to unspecified internal joint prosthesis

, subsequent encounter   





- Problem List Review


Problem List Initiated/Reviewed/Updated: Yes





- My Orders


Last 24 Hours: 


My Active Orders





09/01/17 15:16


Promethazine [Phenergan]   25 mg PO Q6H PRN 





09/01/17 15:23


traMADol [Ultram]   100 mg PO Q6H PRN 





09/01/17 15:30


Enoxaparin [Lovenox]   30 mg SUBCUT Q24H 





09/01/17 17:00


DAPTOmycin [Cubicin] 500 mg   Sodium Chloride 0.9% [Normal Saline] 10 ml IV 

Q24H 





09/02/17 05:04


CBC WITH AUTO DIFF [HEME] Routine 





09/02/17 07:30


Levothyroxine [Synthroid]   88 mcg PO ACBREAKFAST 


Omeprazole   20 mg PO ACBRK 





09/02/17 09:00


amLODIPine [Norvasc]   10 mg PO DAILY 














- Assessment


Assessment:: 


Ass: R periprosthetic TKA infection








- Plan


Plan:: 


1. Elevated LFT's--increased today, hospitalist following


2. wound culture +MSSA--will try different antibiotic as patient is not 

tolerating Cubicin well


3. await CBC results


4. will need definitive procedure at outlying facility. Have not heard back 

from [place], may not hear until Tuesday. No urgency to procedure as patient is 

currently stable.


5. pain controlled with Tramadol


6. OOB as tolerated


7. d/c IVF as patient is eating/drinking

## 2017-09-03 VITALS — SYSTOLIC BLOOD PRESSURE: 120 MMHG | DIASTOLIC BLOOD PRESSURE: 62 MMHG

## 2017-09-03 LAB
CHLORIDE SERPL-SCNC: 102 MMOL/L (ref 98–110)
SODIUM SERPL-SCNC: 135 MMOL/L (ref 136–146)

## 2017-09-03 RX ADMIN — OMEPRAZOLE SCH MG: 20 CAPSULE, DELAYED RELEASE ORAL at 06:35

## 2017-09-03 RX ADMIN — CLINDAMYCIN PHOSPHATE SCH MLS/HR: 12 INJECTION, SOLUTION INTRAVENOUS at 02:13

## 2017-09-03 RX ADMIN — KETOROLAC TROMETHAMINE PRN MG: 15 INJECTION, SOLUTION INTRAMUSCULAR; INTRAVENOUS at 06:36

## 2017-09-03 RX ADMIN — CLINDAMYCIN PHOSPHATE SCH MLS/HR: 12 INJECTION, SOLUTION INTRAVENOUS at 08:01

## 2017-09-03 RX ADMIN — KETOROLAC TROMETHAMINE PRN MG: 15 INJECTION, SOLUTION INTRAMUSCULAR; INTRAVENOUS at 11:29

## 2017-09-03 NOTE — PCM.PN
- General Info


Date of Service: 09/03/17





- Review of Systems


General: Reports: Weakness, Appetite.  Denies: Fever, Chills, Night Sweats


HEENT: Reports: No Symptoms


Pulmonary: Reports: No Symptoms


Cardiovascular: Reports: No Symptoms


Gastrointestinal: Reports: No Symptoms


Genitourinary: Reports: No Symptoms


Musculoskeletal: Reports: Joint Pain


Skin: Reports: No Symptoms


Neurological: Reports: No Symptoms


Psychiatric: Reports: No Symptoms


Systems Review Comment:: 


Patient states that she still has muscle aches, despite the different 

antibiotic. Feels good. Minimal pain in knee. Has been OOB to the bathroom. No 

other complaints.








- Patient Data


Vitals - Most Recent: 


 Last Vital Signs











Temp  98.8 F   09/03/17 07:00


 


Pulse  100   09/03/17 03:43


 


Resp  18   09/03/17 07:00


 


BP  111/61   09/03/17 08:01


 


Pulse Ox  92 L  09/03/17 07:00











Weight - Most Recent: 50.5 kg


I&O - Last 24 Hours: 


 Intake & Output











 09/02/17 09/03/17 09/03/17





 22:59 06:59 14:59


 


Intake Total 1770 1220 


 


Output Total 355 1154 


 


Balance 1415 66 











Lab Results Last 24 Hours: 


 Laboratory Results - last 24 hr











  09/02/17 09/03/17 09/03/17 Range/Units





  05:04 05:24 05:24 


 


WBC  15.46 H   26.88 H  (4.0-11.0)  K/uL


 


RBC  3.64 L   3.38 L  (4.30-5.90)  M/uL


 


Hgb  10.1 L   9.3 L  (12.0-16.0)  g/dL


 


Hct  32.4 L   29.7 L  (36.0-46.0)  %


 


MCV  89.0   87.9  (80.0-98.0)  fL


 


MCH  27.7   27.5  (27.0-32.0)  pg


 


MCHC  31.2   31.3  (31.0-37.0)  g/dL


 


RDW Std Deviation  48.2   47.1  (28.0-62.0)  fl


 


RDW Coeff of Rai  15   15  (11.0-15.0)  %


 


Plt Count  472 H   448 H  (150-400)  K/uL


 


MPV  9.80   9.00  (7.40-12.00)  fL


 


Neut % (Auto)  84.8 H   93.9 H  (48.0-80.0)  %


 


Lymph % (Auto)  9.3 L   3.9 L  (16.0-40.0)  %


 


Mono % (Auto)  3.7   1.7  (0.0-15.0)  %


 


Eos % (Auto)  2.0   0.4  (0.0-7.0)  %


 


Baso % (Auto)  0.2   0.1  (0.0-1.5)  %


 


Neut # (Auto)  13.1 H   25.2 H  (1.4-5.7)  K/uL


 


Lymph # (Auto)  1.4   1.0  (0.6-2.4)  K/uL


 


Mono # (Auto)  0.6   0.5  (0.0-0.8)  K/uL


 


Eos # (Auto)  0.3   0.1  (0.0-0.7)  K/uL


 


Baso # (Auto)  0.0   0.0  (0.0-0.1)  K/uL


 


Nucleated RBC %  0.0   0.0  /100WBC


 


Nucleated RBCs #  0   0  K/uL


 


ESR     (0-29)  mm/hr


 


Sodium   135 L   (136-146)  mmol/L


 


Potassium   4.0   (3.5-5.1)  mmol/L


 


Chloride   102   ()  mmol/L


 


Carbon Dioxide   23   (21-31)  mmol/L


 


BUN   8   (6.0-23.0)  mg/dL


 


Creatinine   0.6   (0.6-1.5)  mg/dL


 


Est Cr Clr Drug Dosing   59.15   mL/min


 


Estimated GFR (MDRD)   > 60.0   ml/min


 


Glucose   97   ()  mg/dL


 


Calcium   7.9 L   (8.8-10.8)  mg/dL


 


Total Bilirubin   0.3   (0.1-1.5)  mg/dL


 


AST   548 H   (5-40)  IU/L


 


ALT   277 H   (8-54)  IU/L


 


Alkaline Phosphatase   96   ()  


 


C-Reactive Protein     (0.0-0.5)  mg/dL


 


Total Protein   5.3 L   (6.0-8.0)  g/dL


 


Albumin   2.5 L   (3.4-4.8)  g/dL


 


Globulin   2.8   (2.0-3.5)  g/dL


 


Albumin/Globulin Ratio   0.9 L   (1.3-2.8)  














  09/03/17 09/03/17 Range/Units





  05:24 05:24 


 


WBC    (4.0-11.0)  K/uL


 


RBC    (4.30-5.90)  M/uL


 


Hgb    (12.0-16.0)  g/dL


 


Hct    (36.0-46.0)  %


 


MCV    (80.0-98.0)  fL


 


MCH    (27.0-32.0)  pg


 


MCHC    (31.0-37.0)  g/dL


 


RDW Std Deviation    (28.0-62.0)  fl


 


RDW Coeff of Rai    (11.0-15.0)  %


 


Plt Count    (150-400)  K/uL


 


MPV    (7.40-12.00)  fL


 


Neut % (Auto)    (48.0-80.0)  %


 


Lymph % (Auto)    (16.0-40.0)  %


 


Mono % (Auto)    (0.0-15.0)  %


 


Eos % (Auto)    (0.0-7.0)  %


 


Baso % (Auto)    (0.0-1.5)  %


 


Neut # (Auto)    (1.4-5.7)  K/uL


 


Lymph # (Auto)    (0.6-2.4)  K/uL


 


Mono # (Auto)    (0.0-0.8)  K/uL


 


Eos # (Auto)    (0.0-0.7)  K/uL


 


Baso # (Auto)    (0.0-0.1)  K/uL


 


Nucleated RBC %    /100WBC


 


Nucleated RBCs #    K/uL


 


ESR  45 H   (0-29)  mm/hr


 


Sodium    (136-146)  mmol/L


 


Potassium    (3.5-5.1)  mmol/L


 


Chloride    ()  mmol/L


 


Carbon Dioxide    (21-31)  mmol/L


 


BUN    (6.0-23.0)  mg/dL


 


Creatinine    (0.6-1.5)  mg/dL


 


Est Cr Clr Drug Dosing    mL/min


 


Estimated GFR (MDRD)    ml/min


 


Glucose    ()  mg/dL


 


Calcium    (8.8-10.8)  mg/dL


 


Total Bilirubin    (0.1-1.5)  mg/dL


 


AST    (5-40)  IU/L


 


ALT    (8-54)  IU/L


 


Alkaline Phosphatase    ()  


 


C-Reactive Protein   25.13 H  (0.0-0.5)  mg/dL


 


Total Protein    (6.0-8.0)  g/dL


 


Albumin    (3.4-4.8)  g/dL


 


Globulin    (2.0-3.5)  g/dL


 


Albumin/Globulin Ratio    (1.3-2.8)  











Med Orders - Current: 


 Current Medications





Amlodipine Besylate (Norvasc)  10 mg PO DAILY [facility]


   Last Admin: 09/03/17 08:01 Dose:  10 mg


Daptomycin (Cubicin)  500 mg IVPUSH Q24H [facility]


Enoxaparin Sodium (Lovenox)  30 mg SUBCUT Q24H [facility]


   Last Admin: 09/02/17 14:45 Dose:  30 mg


Hydromorphone HCl (Dilaudid)  0.5 - 1 mg IVPUSH Q3H PRN


   PRN Reason: Pain


   Last Admin: 09/03/17 01:53 Dose:  1 mg


Sodium Chloride (Normal Saline)  1,000 mls @ 125 mls/hr IV ASDIRECTED [facility]


   Last Admin: 09/03/17 06:39 Dose:  125 mls/hr


Ketorolac Tromethamine (Toradol)  15 mg IVPUSH Q6H PRN


   PRN Reason: Pain


   Last Admin: 09/03/17 06:36 Dose:  15 mg


Levothyroxine Sodium (Synthroid)  88 mcg PO ACBREAKFAST [facility]


   Last Admin: 09/03/17 06:34 Dose:  88 mcg


Omeprazole (Omeprazole)  20 mg PO ACBRK [facility]


   Last Admin: 09/03/17 06:35 Dose:  20 mg


Ondansetron HCl (Zofran)  4 mg IV Q8HR PRN


   PRN Reason: NAUSEA/VOMITING


Promethazine HCl (Phenergan)  25 mg PO Q6H PRN


   PRN Reason: Nausea/Vomiting


   Last Admin: 09/03/17 06:34 Dose:  12.5 mg


Tramadol HCl (Ultram)  100 mg PO Q6H PRN


   PRN Reason: Breakthrough Pain


   Last Admin: 09/03/17 05:30 Dose:  100 mg





Discontinued Medications





Daptomycin (Cubicin)  500 mg IVPUSH Q24H [facility]


Hydromorphone HCl (Dilaudid)  0.5 mg IV ONETIME ONE


   Stop: 09/01/17 12:46


   Last Admin: 09/01/17 13:27 Dose:  0.5 mg


Hydromorphone HCl (Dilaudid)  0.5 mg IV ONETIME ONE


   Stop: 09/01/17 13:59


   Last Admin: 09/01/17 14:07 Dose:  0.5 mg


Sodium Chloride (Normal Saline)  1,000 mls @ 999 mls/hr IV ASDIRECTED [facility]


   Last Admin: 09/01/17 13:03 Dose:  999 mls/hr


Lactated Ringer's (Ringers, Lactated)  1,000 mls @ 125 mls/hr IV ASDIRECTED [facility]


   Last Admin: 09/02/17 00:16 Dose:  125 mls/hr


Daptomycin 500 mg/ Sodium (Chloride)  10 mls @ 300 mls/hr IV Q24H [facility]


   Last Admin: 09/01/17 16:14 Dose:  300 mls/hr


Clindamycin Phosphate 600 mg/ (Premix)  50 mls @ 100 mls/hr IV Q6H [facility]


   Last Admin: 09/03/17 08:01 Dose:  100 mls/hr


Ketorolac Tromethamine (Toradol)  30 mg IVPUSH ONETIME ONE


   Stop: 09/01/17 12:43


   Last Admin: 09/01/17 13:03 Dose:  30 mg


Ondansetron HCl (Zofran)  4 mg IVPUSH ONETIME ONE


   Stop: 09/01/17 12:43


   Last Admin: 09/01/17 13:03 Dose:  4 mg


Scopolamine (Transderm-Scop)  1.5 mg TRDERM Q72H [facility]


   Stop: 09/04/17 15:16


   Last Admin: 09/01/17 15:58 Dose:  1.5 mg











- Exam


General: Alert, Oriented


Lungs: Normal Respiratory Effort


Cardiovascular: Regular Rate


GI/Abdominal Exam: Soft


Neurological: No New Focal Deficit


Physical Findings Comments:: 


Exam of R knee shows no effusion. Medial wound persists, unchanged without 

erythema, induration. Minimal amount of drainage on dressing. ROM 0-90. No calf 

TTP. AT/EHL/gastroc 5/5. Sensation intact. DP 2+.








- Problem List & Annotations


(1) Infection of total joint prosthesis


SNOMED Code(s): 710462136


   Code(s): T84.50XA - INFECT/INFLM REACTION DUE TO UNSP INT JOINT PROSTH, INIT

   Status: Acute   Current Visit: No   


Qualifiers: 


   Encounter type: subsequent encounter   Qualified Code(s): T84.50XD - 

Infection and inflammatory reaction due to unspecified internal joint prosthesis

, subsequent encounter   





- Problem List Review


Problem List Initiated/Reviewed/Updated: Yes





- My Orders


Last 24 Hours: 


My Active Orders





09/02/17 09:00


amLODIPine [Norvasc]   10 mg PO DAILY 





09/02/17 17:03


Code Status [Resuscitation Status] Routine 





09/03/17 09:15


DAPTOmycin [Cubicin]   500 mg IVPUSH Q24H 














- Assessment


Assessment:: 


Ass: R periprosthetic TKA infection








- Plan


Plan:: 


1. changed to clindamycin yesterday due to patient's complaint of muscle aches 

from the cubicin. Muscle aches persist and WBC and inflammatory labs now 

elevated. Will d/c clindamycin and place back on cubicin.


2. elevated LFT's continue to rise--unknown reason


3. discussed plan with patient and family. They would like patient to go to 

[facility] for definitive care. I did speak with Dr. Gautam last night. He is 

currently out of town. He states that the soonest he could do definitive 

surgery would be ~10-14 days. I feel that with her current labs, something more 

definitive needs to be done sooner rather than later. I am recommending 

transfer to [facility]. I have discussed the patient with Dr. Gilberto Breen in 

the past. He has agreed to see the patient this week. I will discuss with ortho 

surgeon on call and transfer if appropriate. I did discuss the plan with both 

the patient and daughter.


4. VSS, afeb. BC negative. Lactic acid on admission normal.

## 2017-09-03 NOTE — PCM.DCSUM1
<Mando Marmolejo - Last Filed: 09/03/17 11:14>





**Discharge Summary





- Hospital Course


Brief History: Patient is an 81-year-old female who has a long history of right 

knee difficulties. Her pain began in June 2014 without a specific injury. It 

progressed to the point where she was unable to bear weight. She does have a 

history of CREST syndrome. 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 X-rays 

taken at that time along with a CAT scan showed a depression fracture of the 

right lateral tibial plateau along with an insufficiency fracture through the 

posterior aspect of the lateral femoral condyle. We treated this initially with 

nonweightbearing. She subsequently underwent a right total knee arthroplasty on 

December 29, 2014. Due to her bony deformity, we did proceed with an LCCK 

implant. She progressed well with physical therapy following the procedure.  On 

April 27, 2016 she presented to clinic with pain and induration along the 

medial aspect of her knee. This was not associated with the previous incision. 

She failed to respond to conservative treatment and on April 29, 2016 she 

underwent irrigation and debridement of the wound. At the time of surgery 

cultures were obtained which were positive for MSSA. The wound tunneled 

medially. There was no intrusion into the knee joint. She had a long course 

postoperatively that required wound therapy and VAC placement. Eventually the 

wound fully healed.  On July 20, 2017, she developed an open area along the 

medial aspect of the knee again. We did treat this with a course of IV Cubicin 

which did decrease the pain and swelling in her knee. She also underwent a CT 

scan of the knee which did not show any collection of fluid or other 

abnormality. She continued with wound therapy. On her appointment on August 14, 2017 the wound had completely healed and there was no further drainage.  

Unfortunately, she noticed increased swelling and drainage from the right knee 

on August 27, 2017. She developed increased inflammatory laboratory studies and 

was hospitalized. She was started on Cubicin. An aspirate of her knee performed 

on August 30, 2017 showed a white count of 126,000. No crystals were noted. The 

Gram stain was positive for moderate gram-positive cocci in clusters. Final 

culture +MSSA. Blood cultures were negative 2 days. Her white count had 

normalized. A PICC line was placed.  We did contact the orthopedic department 

at [facility] in [place], [place]. copies of her x-rays will be FedEx to them. 

Copies of her notes and final culture results will be sent as well. The family 

has requested that she be referred to [place] for definitive treatment of the 

knee as they do have family in that area. The orthopedic department has said 

that they will contact the patient once they have received all of the 

information.  Patient was discharged home 8/31 after placement of a PICC line. 

Received Cubicin evening of 8/31. She was feeling quite good and wBC count had 

normalized. Over night and morning of 9/1 she began aching all over. [place] tired 

and was having trouble ambulating. I spoke with patient via telephone and 

recommended she go to the ER. In the ER, she had an extensive work up including 

labs, XR, and blood cultures. Vitals were stable. WBC elevated at 12. ESR and 

CRP elevated but trending down. she has been taking Tylenol scheduled for pain. 

She has an intolerance to most medications and takes phenergan regularly. Has 

not been able to drink much or eat since she was discharged 8/31.  She was 

admitted 9/1 and restarted on Cubicin. Labs were drawin and showed elevated LFTs

, hospitalist was consulted. ESR, CRP and WBC again elevated. Pain controlled 

with Tramadol and Dialudid. Continued Cubicin daily. She is afebrile and VSS. 

Patient is ready for transfer.





- Discharge Data


Discharge Date: 09/03/17 ([facility])


Discharge Disposition: DC/Tfer to Acute Hospital 02


Condition: Fair





- Patient Instructions


Diet: Usual Diet as Tolerated


Activity: Apply Ice, As Tolerated, Elevate Extremity, Full Weight Bearing


Activity, Other: Use walker as needed.


Driving: Do Not Drive


Wound/Incision Care: Keep Operative Site/Wound Site Clean and Dry


Notify Provider of: Fever, Increased Pain, Swelling and Redness, Drainage, 

Nausea and/or Vomiting





- Discharge Plan


Prescriptions/Med Rec: 


traMADol [Ultram] 100 mg PO Q6H PRN #80 tablet


 PRN Reason: Pain


Home Medications: 


 Home Meds





amLODIPine Besylate [Amlodipine Besylate] 10 mg PO DAILY 12/26/14 [History]


Omeprazole Magnesium [Prilosec Otc] 20 mg PO ACBRK 12/30/14 [History]


Promethazine [Phenergan] 25 mg PO Q6H PRN #30 tablet 01/02/15 [Rx]


Denosumab [Prolia] 60 mg IM Q180D 07/28/16 [History]


Levothyroxine [Synthroid] 88 mcg PO ACBREAKFAST 07/28/16 [History]


Ondansetron HCl [Zofran] 4 mg PO Q8HR PRN #16 tablet 07/17/17 [Rx]


traMADol [Ultram] 100 mg PO Q6H PRN #80 tablet 07/22/17 [Rx]


Acetaminophen [Tylenol Extra Strength] 1,000 mg PO Q6H PRN  tablet 08/31/17 [Rx]


traMADol [Ultram] 100 mg PO Q6H PRN #80 tablet 09/03/17 [Rx]








Forms:  ED Department Discharge


Referrals: 


Keegan Pope MD [Physician] - 





- General Info


Functional Status: Reports: Pain Controlled, Tolerating Diet, Ambulating, 

Urinating





- Review of Systems


General: Reports: No Symptoms (Patient states that she feels good. Minimal pain 

in knee. Has been OOB to the bathroom. No other complaints.)


Pulmonary: Reports: No Symptoms


Cardiovascular: Reports: No Symptoms


Gastrointestinal: Reports: No Symptoms


Genitourinary: Reports: No Symptoms


Musculoskeletal: Reports: Leg Pain, Joint Pain, Joint Swelling


Neurological: Reports: No Symptoms


Psychiatric: Reports: No Symptoms





- Patient Data


Vitals - Most Recent: 


 Last Vital Signs











Temp  37.1 C   09/03/17 07:00


 


Pulse  100   09/03/17 03:43


 


Resp  18   09/03/17 07:00


 


BP  111/61   09/03/17 08:01


 


Pulse Ox  92 L  09/03/17 07:00











Weight - Most Recent: 50.5 kg


I&O - Last 24 hours: 


 Intake & Output











 09/02/17 09/03/17 09/03/17





 22:59 06:59 14:59


 


Intake Total 1770 1220 


 


Output Total 355 1154 


 


Balance 1415 66 











Lab Results - Last 24 hrs: 


 Laboratory Results - last 24 hr











  09/03/17 09/03/17 09/03/17 Range/Units





  05:24 05:24 05:24 


 


WBC   26.88 H   (4.0-11.0)  K/uL


 


RBC   3.38 L   (4.30-5.90)  M/uL


 


Hgb   9.3 L   (12.0-16.0)  g/dL


 


Hct   29.7 L   (36.0-46.0)  %


 


MCV   87.9   (80.0-98.0)  fL


 


MCH   27.5   (27.0-32.0)  pg


 


MCHC   31.3   (31.0-37.0)  g/dL


 


RDW Std Deviation   47.1   (28.0-62.0)  fl


 


RDW Coeff of Rai   15   (11.0-15.0)  %


 


Plt Count   448 H   (150-400)  K/uL


 


MPV   9.00   (7.40-12.00)  fL


 


Neut % (Auto)   93.9 H   (48.0-80.0)  %


 


Lymph % (Auto)   3.9 L   (16.0-40.0)  %


 


Mono % (Auto)   1.7   (0.0-15.0)  %


 


Eos % (Auto)   0.4   (0.0-7.0)  %


 


Baso % (Auto)   0.1   (0.0-1.5)  %


 


Neut # (Auto)   25.2 H   (1.4-5.7)  K/uL


 


Lymph # (Auto)   1.0   (0.6-2.4)  K/uL


 


Mono # (Auto)   0.5   (0.0-0.8)  K/uL


 


Eos # (Auto)   0.1   (0.0-0.7)  K/uL


 


Baso # (Auto)   0.0   (0.0-0.1)  K/uL


 


Nucleated RBC %   0.0   /100WBC


 


Nucleated RBCs #   0   K/uL


 


ESR    45 H  (0-29)  mm/hr


 


Sodium  135 L    (136-146)  mmol/L


 


Potassium  4.0    (3.5-5.1)  mmol/L


 


Chloride  102    ()  mmol/L


 


Carbon Dioxide  23    (21-31)  mmol/L


 


BUN  8    (6.0-23.0)  mg/dL


 


Creatinine  0.6    (0.6-1.5)  mg/dL


 


Est Cr Clr Drug Dosing  59.15    mL/min


 


Estimated GFR (MDRD)  > 60.0    ml/min


 


Glucose  97    ()  mg/dL


 


Calcium  7.9 L    (8.8-10.8)  mg/dL


 


Total Bilirubin  0.3    (0.1-1.5)  mg/dL


 


AST  548 H    (5-40)  IU/L


 


ALT  277 H    (8-54)  IU/L


 


Alkaline Phosphatase  96    ()  


 


C-Reactive Protein     (0.0-0.5)  mg/dL


 


Total Protein  5.3 L    (6.0-8.0)  g/dL


 


Albumin  2.5 L    (3.4-4.8)  g/dL


 


Globulin  2.8    (2.0-3.5)  g/dL


 


Albumin/Globulin Ratio  0.9 L    (1.3-2.8)  














  09/03/17 Range/Units





  05:24 


 


WBC   (4.0-11.0)  K/uL


 


RBC   (4.30-5.90)  M/uL


 


Hgb   (12.0-16.0)  g/dL


 


Hct   (36.0-46.0)  %


 


MCV   (80.0-98.0)  fL


 


MCH   (27.0-32.0)  pg


 


MCHC   (31.0-37.0)  g/dL


 


RDW Std Deviation   (28.0-62.0)  fl


 


RDW Coeff of Rai   (11.0-15.0)  %


 


Plt Count   (150-400)  K/uL


 


MPV   (7.40-12.00)  fL


 


Neut % (Auto)   (48.0-80.0)  %


 


Lymph % (Auto)   (16.0-40.0)  %


 


Mono % (Auto)   (0.0-15.0)  %


 


Eos % (Auto)   (0.0-7.0)  %


 


Baso % (Auto)   (0.0-1.5)  %


 


Neut # (Auto)   (1.4-5.7)  K/uL


 


Lymph # (Auto)   (0.6-2.4)  K/uL


 


Mono # (Auto)   (0.0-0.8)  K/uL


 


Eos # (Auto)   (0.0-0.7)  K/uL


 


Baso # (Auto)   (0.0-0.1)  K/uL


 


Nucleated RBC %   /100WBC


 


Nucleated RBCs #   K/uL


 


ESR   (0-29)  mm/hr


 


Sodium   (136-146)  mmol/L


 


Potassium   (3.5-5.1)  mmol/L


 


Chloride   ()  mmol/L


 


Carbon Dioxide   (21-31)  mmol/L


 


BUN   (6.0-23.0)  mg/dL


 


Creatinine   (0.6-1.5)  mg/dL


 


Est Cr Clr Drug Dosing   mL/min


 


Estimated GFR (MDRD)   ml/min


 


Glucose   ()  mg/dL


 


Calcium   (8.8-10.8)  mg/dL


 


Total Bilirubin   (0.1-1.5)  mg/dL


 


AST   (5-40)  IU/L


 


ALT   (8-54)  IU/L


 


Alkaline Phosphatase   ()  


 


C-Reactive Protein  25.13 H  (0.0-0.5)  mg/dL


 


Total Protein   (6.0-8.0)  g/dL


 


Albumin   (3.4-4.8)  g/dL


 


Globulin   (2.0-3.5)  g/dL


 


Albumin/Globulin Ratio   (1.3-2.8)  











Med Orders - Current: 


 Current Medications





Amlodipine Besylate (Norvasc)  10 mg PO DAILY [facility]


   Last Admin: 09/03/17 08:01 Dose:  10 mg


Enoxaparin Sodium (Lovenox)  30 mg SUBCUT Q24H [facility]


   Last Admin: 09/02/17 14:45 Dose:  30 mg


Hydromorphone HCl (Dilaudid)  0.5 - 1 mg IVPUSH Q3H PRN


   PRN Reason: Pain


   Last Admin: 09/03/17 10:02 Dose:  1 mg


Sodium Chloride (Normal Saline)  1,000 mls @ 125 mls/hr IV ASDIRECTED [facility]


   Last Admin: 09/03/17 06:39 Dose:  125 mls/hr


Daptomycin 500 mg/ Sodium (Chloride)  10 mls @ 300 mls/hr IV Q24H [facility]


   Last Admin: 09/03/17 10:03 Dose:  300 mls/hr


Ketorolac Tromethamine (Toradol)  15 mg IVPUSH Q6H PRN


   PRN Reason: Pain


   Last Admin: 09/03/17 06:36 Dose:  15 mg


Levothyroxine Sodium (Synthroid)  88 mcg PO ACBREAKFAST [facility]


   Last Admin: 09/03/17 06:34 Dose:  88 mcg


Omeprazole (Omeprazole)  20 mg PO ACBRK [facility]


   Last Admin: 09/03/17 06:35 Dose:  20 mg


Ondansetron HCl (Zofran)  4 mg IV Q8HR PRN


   PRN Reason: NAUSEA/VOMITING


Promethazine HCl (Phenergan)  25 mg PO Q6H PRN


   PRN Reason: Nausea/Vomiting


   Last Admin: 09/03/17 06:34 Dose:  12.5 mg


Tramadol HCl (Ultram)  100 mg PO Q6H PRN


   PRN Reason: Breakthrough Pain


   Last Admin: 09/03/17 05:30 Dose:  100 mg





Discontinued Medications





Daptomycin (Cubicin)  500 mg IVPUSH Q24H [facility]


Daptomycin (Cubicin)  500 mg IVPUSH Q24H [facility]


Hydromorphone HCl (Dilaudid)  0.5 mg IV ONETIME ONE


   Stop: 09/01/17 12:46


   Last Admin: 09/01/17 13:27 Dose:  0.5 mg


Hydromorphone HCl (Dilaudid)  0.5 mg IV ONETIME ONE


   Stop: 09/01/17 13:59


   Last Admin: 09/01/17 14:07 Dose:  0.5 mg


Sodium Chloride (Normal Saline)  1,000 mls @ 999 mls/hr IV ASDIRECTED [facility]


   Last Admin: 09/01/17 13:03 Dose:  999 mls/hr


Lactated Ringer's (Ringers, Lactated)  1,000 mls @ 125 mls/hr IV ASDIRECTED [facility]


   Last Admin: 09/02/17 00:16 Dose:  125 mls/hr


Daptomycin 500 mg/ Sodium (Chloride)  10 mls @ 300 mls/hr IV Q24H [facility]


   Last Admin: 09/01/17 16:14 Dose:  300 mls/hr


Clindamycin Phosphate 600 mg/ (Premix)  50 mls @ 100 mls/hr IV Q6H [facility]


   Last Admin: 09/03/17 08:01 Dose:  100 mls/hr


Ketorolac Tromethamine (Toradol)  30 mg IVPUSH ONETIME ONE


   Stop: 09/01/17 12:43


   Last Admin: 09/01/17 13:03 Dose:  30 mg


Ondansetron HCl (Zofran)  4 mg IVPUSH ONETIME ONE


   Stop: 09/01/17 12:43


   Last Admin: 09/01/17 13:03 Dose:  4 mg


Scopolamine (Transderm-Scop)  1.5 mg TRDERM Q72H [facility]


   Stop: 09/04/17 15:16


   Last Admin: 09/01/17 15:58 Dose:  1.5 mg











- Exam


General: Reports: Alert, Oriented


HEENT: Reports: Pupils Equal, Pupils Reactive


Neck: Reports: Trachea Midline


Lungs: Reports: Normal Respiratory Effort


Cardiovascular: Reports: Regular Rate


Extremities: Other (Exam of R knee shows no effusion. Medial wound persists, 

unchanged without erythema, induration. Minimal amount of drainage on dressing. 

ROM 0-90. No calf TTP. AT/EHL/gastroc 5/5. Sensation intact. DP 2+.)


Wound/Incisions: Reports: Dressing Dry and Intact


Neurological: Reports: No New Focal Deficit


Psy/Mental Status: Reports: Alert, Normal Affect, Normal Mood





*Q Meaningful Use (DIS)





- VTE *Q


VTE Criteria *Q: 








- Stroke *Q


Stroke Criteria *Q: 








- AMI *Q


AMI Criteria *Q: 








<Mary Carmen Pope R - Last Filed: 09/06/17 08:52>





**Discharge Summary





- Hospital Course


Brief History: Initially the patient wanted to go to [facility] for definitive 

care. I did speak with the surgeon there and he stated that it would be 10-14 

days before he could do the surgery. Due to her increasing labs, I felt she 

would be better served by more urgent surgery and a higher level of care. I did 

speak with Dr. Harris/Dr. Breen at [facility] in [place]. They felt she was 

appropriate for transfer there. I also spoke with Dr. Awad, hospitalist, who 

agreed to accept the patient due to her multiple medical comorbidities.





- Discharge Diagnosis/Problem(s)


(1) Infection of total joint prosthesis


SNOMED Code(s): 235201958


   ICD Code: T84.50XA - INFECT/INFLM REACTION DUE TO UNSP INT JOINT PROSTH, 

INIT   Status: Acute   


Qualifiers: 


   Encounter type: subsequent encounter   Qualified Code(s): T84.50XD - 

Infection and inflammatory reaction due to unspecified internal joint prosthesis

, subsequent encounter   





- Patient Data


Vitals - Most Recent: 


 Last Vital Signs











Temp  98.2 F   09/03/17 11:00


 


Pulse  107 H  09/03/17 11:00


 


Resp  20   09/03/17 11:00


 


BP  120/62   09/03/17 11:00


 


Pulse Ox  92 L  09/03/17 11:00











Med Orders - Current: 


 Current Medications








Discontinued Medications





Amlodipine Besylate (Norvasc)  10 mg PO DAILY [facility]


   Last Admin: 09/03/17 08:01 Dose:  10 mg


Daptomycin (Cubicin)  500 mg IVPUSH Q24H [facility]


Daptomycin (Cubicin)  500 mg IVPUSH Q24H [facility]


Enoxaparin Sodium (Lovenox)  30 mg SUBCUT Q24H [facility]


   Last Admin: 09/02/17 14:45 Dose:  30 mg


Hydromorphone HCl (Dilaudid)  0.5 mg IV ONETIME ONE


   Stop: 09/01/17 12:46


   Last Admin: 09/01/17 13:27 Dose:  0.5 mg


Hydromorphone HCl (Dilaudid)  0.5 mg IV ONETIME ONE


   Stop: 09/01/17 13:59


   Last Admin: 09/01/17 14:07 Dose:  0.5 mg


Hydromorphone HCl (Dilaudid)  0.5 - 1 mg IVPUSH Q3H PRN


   PRN Reason: Pain


   Last Admin: 09/03/17 10:02 Dose:  1 mg


Sodium Chloride (Normal Saline)  [facility]000 [facility]


   Last Admin: 09/01/17 13:03 Dose:  999 mls/hr


Lactated Ringer's (Ringers, Lactated)  1,000 mls [facility]


   Last Admin: 09/02/17 00:16 Dose:  125 mls/hr


Daptomycin 500 mg/ Sodium (Chloride)  10 mls @ 300 mls/hr IV Q24H [facility]


   Last Admin: 09/01/17 16:14 Dose:  300 mls/hr


Clindamycin Phosphate 600 mg/ (Premix)  50 mls @ 100 mls/hr IV Q6H [facility]


   Last Admin: 09/03/17 08:01 Dose:  100 mls/hr


Sodium Chloride (Normal Saline)  1,000 mls @ 125 mls/hr IV ASDIRECTED [facility]


   Last Admin: 09/03/17 06:39 Dose:  125 mls/hr


Daptomycin 500 mg/ Sodium (Chloride)  10 mls @ 300 mls/hr IV Q24H [facility]


   Last Admin: 09/03/17 10:03 Dose:  300 mls/hr


Ketorolac Tromethamine (Toradol)  30 mg IVPUSH ONETIME ONE


   Stop: 09/01/17 12:43


   Last Admin: 09/01/17 13:03 Dose:  30 mg


Ketorolac Tromethamine (Toradol)  15 mg IVPUSH Q6H PRN


   PRN Reason: Pain


   Last Admin: 09/03/17 11:29 Dose:  15 mg


Levothyroxine Sodium (Synthroid)  88 mcg PO ACBREAKFAST [facility]


   Last Admin: 09/03/17 06:34 Dose:  88 mcg


Omeprazole (Omeprazole)  20 mg PO ACBRK [facility]


   Last Admin: 09/03/17 06:35 Dose:  20 mg


Ondansetron HCl (Zofran)  4 mg IVPUSH ONETIME ONE


   Stop: 09/01/17 12:43


   Last Admin: 09/01/17 13:03 Dose:  4 mg


Ondansetron HCl (Zofran)  4 mg IV Q8HR PRN


   PRN Reason: NAUSEA/VOMITING


Promethazine HCl (Phenergan)  25 mg PO Q6H PRN


   PRN Reason: Nausea/Vomiting


   Last Admin: 09/03/17 06:34 Dose:  12.5 mg


Scopolamine (Transderm-Scop)  1.5 mg TRDERM Q72H [facility]


   Stop: 09/04/17 15:16


   Last Admin: 09/01/17 15:58 Dose:  1.5 mg


Tramadol HCl (Ultram)  100 mg PO Q6H PRN


   PRN Reason: Breakthrough Pain


   Last Admin: 09/03/17 05:30 Dose:  100 mg











*Q Meaningful Use (DIS)





- VTE *Q


VTE Criteria *Q: 








- Stroke *Q


Stroke Criteria *Q: 








- AMI *Q


AMI Criteria *Q:

## 2017-09-03 NOTE — PCM.SN
- Free Text/Narrative


Note: 


Discharge Summary





Dressing was changed prior to discharge.


See discharge plan for complete list of discharge medications and instructions.





Dictation #: [id number]

## 2017-09-04 NOTE — DISCH
DATE OF DISCHARGE:

09/03/2017

 

PRIMARY CARE PHYSICIAN:

None PCP

 

ADMITTING DIAGNOSES:

1. Antibiotic-induced nausea and vomiting.

2. Infection of prosthetic knee, right.

 

OTHER MEDICAL DIAGNOSES:

Include:

1. Crest syndrome.

2. Hypothyroidism.

 

DISCHARGE DIAGNOSES:

1. Infection of right prosthetic joint.

2. Crest syndrome.

3. Hypothyroidism.

 

BRIEF HISTORY:

Agata is an 81-year-old female who has a long history of right knee

difficulties.  Her pain began on June 2014 without specific injury.  She then

underwent a right total knee arthroplasty in December 2014.  She did have

persistent draining along her right knee noticed April 2016.  She did undergo an

irrigation and debridement at that time.  On July 20, 2017, she again noticed an

open area on the medial aspect of the knee.  She was treated with IV Cubicin,

which decreased the pain and swelling of her knee.  This did resolve.  Again on

August 27, 2017, swelling and drainage returned.  She was admitted to the

hospital.  An aspirate was performed, showed a white count of 1026.  No crystals

were noted.  Gram stain was positive for MSSA.  She did have negative blood

cultures.  She was then discharged.  One day after discharge, she was to return

to the emergency department with complaint of nausea and vomiting after

receiving antibiotics.  She again had an elevated white count, ESR, and CRP.  At

this time, she will be transferred to [facility] in [place].  Dr. Gilberto Breen

did accept this patient.  She will undergo a right total knee revision.

 

OPERATION:

None.

 

HOSPITAL COURSE:

Pain was controlled via p.o. and IV pain medications.  She did receive a

clindamycin daily.  She was followed by hospitalist during her hospital stay.

Upon discharge, vital signs are stable and she is afebrile.

 

DISCHARGE MEDICATIONS:

Tramadol 50 mg.

 

DISCHARGE INSTRUCTIONS:

She will be transferred to [place], [facility] and treated there for

right infected total knee.

 

For complete medication reconciliation and discharge instructions, please refer

to the patient's EHR.

 

 

KEILA DIALLO

DD:  09/03/2017 10:53:33

DT:  09/04/2017 01:34:45

Job #:  [id number]

## 2019-11-01 ENCOUNTER — HOSPITAL ENCOUNTER (INPATIENT)
Dept: HOSPITAL 56 - MW.ED | Age: 84
LOS: 4 days | Discharge: NURSING, REHAB OR LONG-TERM CARE | DRG: 603 | End: 2019-11-05
Attending: INTERNAL MEDICINE | Admitting: INTERNAL MEDICINE
Payer: MEDICARE

## 2019-11-01 DIAGNOSIS — L89.890: ICD-10-CM

## 2019-11-01 DIAGNOSIS — N32.81: ICD-10-CM

## 2019-11-01 DIAGNOSIS — L89.321: ICD-10-CM

## 2019-11-01 DIAGNOSIS — K58.0: ICD-10-CM

## 2019-11-01 DIAGNOSIS — Z88.8: ICD-10-CM

## 2019-11-01 DIAGNOSIS — Z88.5: ICD-10-CM

## 2019-11-01 DIAGNOSIS — K21.9: ICD-10-CM

## 2019-11-01 DIAGNOSIS — E87.1: ICD-10-CM

## 2019-11-01 DIAGNOSIS — M34.1: ICD-10-CM

## 2019-11-01 DIAGNOSIS — L03.113: ICD-10-CM

## 2019-11-01 DIAGNOSIS — L53.9: ICD-10-CM

## 2019-11-01 DIAGNOSIS — L97.429: ICD-10-CM

## 2019-11-01 DIAGNOSIS — Z88.2: ICD-10-CM

## 2019-11-01 DIAGNOSIS — Z90.49: ICD-10-CM

## 2019-11-01 DIAGNOSIS — I73.00: ICD-10-CM

## 2019-11-01 DIAGNOSIS — Z96.659: ICD-10-CM

## 2019-11-01 DIAGNOSIS — Z90.710: ICD-10-CM

## 2019-11-01 DIAGNOSIS — M19.90: ICD-10-CM

## 2019-11-01 DIAGNOSIS — Z91.040: ICD-10-CM

## 2019-11-01 DIAGNOSIS — L98.499: ICD-10-CM

## 2019-11-01 DIAGNOSIS — M87.9: ICD-10-CM

## 2019-11-01 DIAGNOSIS — Z88.6: ICD-10-CM

## 2019-11-01 DIAGNOSIS — L97.529: ICD-10-CM

## 2019-11-01 DIAGNOSIS — N39.0: ICD-10-CM

## 2019-11-01 DIAGNOSIS — Z79.890: ICD-10-CM

## 2019-11-01 DIAGNOSIS — Z88.0: ICD-10-CM

## 2019-11-01 DIAGNOSIS — L89.620: ICD-10-CM

## 2019-11-01 DIAGNOSIS — Z79.899: ICD-10-CM

## 2019-11-01 DIAGNOSIS — L03.116: Primary | ICD-10-CM

## 2019-11-01 DIAGNOSIS — R60.9: ICD-10-CM

## 2019-11-01 DIAGNOSIS — E03.9: ICD-10-CM

## 2019-11-01 LAB
BUN SERPL-MCNC: 16 MG/DL (ref 7–18)
CHLORIDE SERPL-SCNC: 96 MMOL/L (ref 98–107)
CO2 SERPL-SCNC: 16.7 MMOL/L (ref 21–32)
GLUCOSE SERPL-MCNC: 82 MG/DL (ref 74–106)
HBA1C BLD-MCNC: 5.5 % (ref 4.5–6.2)
POTASSIUM SERPL-SCNC: 4.6 MMOL/L (ref 3.5–5.1)
SODIUM SERPL-SCNC: 128 MMOL/L (ref 136–145)

## 2019-11-01 PROCEDURE — 83605 ASSAY OF LACTIC ACID: CPT

## 2019-11-01 PROCEDURE — 83036 HEMOGLOBIN GLYCOSYLATED A1C: CPT

## 2019-11-01 PROCEDURE — 85652 RBC SED RATE AUTOMATED: CPT

## 2019-11-01 PROCEDURE — 36415 COLL VENOUS BLD VENIPUNCTURE: CPT

## 2019-11-01 PROCEDURE — 86140 C-REACTIVE PROTEIN: CPT

## 2019-11-01 PROCEDURE — 99284 EMERGENCY DEPT VISIT MOD MDM: CPT

## 2019-11-01 PROCEDURE — 80053 COMPREHEN METABOLIC PANEL: CPT

## 2019-11-01 PROCEDURE — 73620 X-RAY EXAM OF FOOT: CPT

## 2019-11-01 PROCEDURE — 87040 BLOOD CULTURE FOR BACTERIA: CPT

## 2019-11-01 PROCEDURE — 73140 X-RAY EXAM OF FINGER(S): CPT

## 2019-11-01 PROCEDURE — 85025 COMPLETE CBC W/AUTO DIFF WBC: CPT

## 2019-11-01 RX ADMIN — SODIUM CHLORIDE, PRESERVATIVE FREE PRN ML: 5 INJECTION INTRAVENOUS at 17:25

## 2019-11-01 RX ADMIN — KETOROLAC TROMETHAMINE PRN MG: 30 INJECTION, SOLUTION INTRAMUSCULAR at 19:34

## 2019-11-01 NOTE — CR
Indication:



Source. Rule out infectious/calf.



Technique:



Three views of the left foot were obtained.



Comparison:



None



Findings:



Degenerative changes are identified at the 1st metatarsophalangeal joint 

space. Shortening of the proximal phalanx of the left great toe is 

identified. No fracture or subluxation is identified.



Impression:



Degenerative change at the 1st metatarsophalangeal joint space. There is 

slight irregularity of the distal 1st metatarsal. Osteomyelitis cannot be 

completely excluded. However, there is no air within the soft tissues.



Dictated by Malika Catherine MD @ Nov 1 2019  4:29PM



Signed by Dr. Malika Catherine @ Nov 1 2019  4:30PM

## 2019-11-01 NOTE — CR
Indication:



Redness. Swelling. Sore.



Technique:



Three views of the right 5th finger were obtained.



Comparison:



None



Findings:



Marked irregularity is identified at the distal interphalangeal joint space 

of the 5th finger. This is believed to be chronic. However, there is 

questionably air within the soft tissues and thus a questionable associated 

soft tissue at the level of the large osteophyte extending from the medial 

aspect of the base of the distal phalanx.



Impression:



Cannot exclude osteomyelitis of the distal phalanx. Marked degenerative 

change of the 5th distal interphalangeal joint space.



Dictated by Malika Catherine MD @ Nov 1 2019  4:30PM



Signed by Dr. Malika Catherine @ Nov 1 2019  4:31PM

## 2019-11-01 NOTE — PCM.HP.2
H&P History of Present Illness





- General


Date of Service: 11/01/19


Admit Problem/Dx: 


 Admission Diagnosis/Problem





Admission Diagnosis/Problem      Cellulitis











- History of Present Illness


Initial Comments - Free Text/Narative: 





85 y/o female with history of Raynaud's syndrome who presented from [facility] for worsening left foot pain. History obtained from patient and 

daughter who was in the room. Daughter states that superficial ulcers started 

appearing on patient's left foot mostly on the sole about 1 month ago. Patient 

had gone to her PCP for these sores and recommended to use Betadine on sores.  

However, it did not improve. She continued to get worsening sores. Endorses 

pain specially with palpation or movement 10/10. Tolerable with pain 

medication. No discharge. Some erythema and swelling on her left foot.  In 

addition, she is complaining of a small right 5th digit sore that started a few 

days ago. In the ER, xrays of her left foot and right hand did not show air 

within the tissue. Osteomyelitis could not be excluded. In addition, she has a 

decubitus pressure ulcer.


  ** Left Feet


Pain Score (Numeric/FACES): 9





- Related Data


Allergies/Adverse Reactions: 


 Allergies











Allergy/AdvReac Type Severity Reaction Status Date / Time


 


codeine Allergy  Vomiting Verified 11/01/19 15:30


 


latex Allergy  Rash Verified 11/01/19 15:30


 


morphine Allergy  Difficulty Verified 11/01/19 15:30





   Breathing  


 


oxycodone HCl [From Percocet] Allergy  Vomiting Verified 11/01/19 15:30


 


Penicillins Allergy  Anaphylactic Verified 11/01/19 15:30





   Shock  


 


Sulfa (Sulfonamide Allergy  Anaphylactic Verified 11/01/19 15:30





Antibiotics)   Shock  


 


all pain meds Allergy  Nausea Uncoded 11/01/19 15:30











Home Medications: 


 Home Meds





amLODIPine Besylate [Amlodipine Besylate] 10 mg PO DAILY 12/26/14 [History]


Omeprazole Magnesium [Prilosec Otc] 20 mg PO ACBRK 12/30/14 [History]


Promethazine [Phenergan] 25 mg PO Q6H PRN #30 tablet 01/02/15 [Rx]


Denosumab [Prolia] 60 mg IM Q180D 07/28/16 [History]


Levothyroxine [Synthroid] 88 mcg PO ACBREAKFAST 07/28/16 [History]


Ondansetron HCl [Zofran] 4 mg PO Q8HR PRN #16 tablet 07/17/17 [Rx]


traMADol [Ultram] 100 mg PO Q6H PRN #80 tablet 07/22/17 [Rx]


Acetaminophen [Tylenol Extra Strength] 1,000 mg PO Q6H PRN  tablet 08/31/17 [Rx]


traMADol [Ultram] 100 mg PO Q6H PRN #80 tablet 09/03/17 [Rx]











Past Medical History





- Past Health History


Medical/Surgical History: Denies Medical/Surgical History


HEENT History: Reports: Cataract


Other HEENT History: has upper denture and lower partial


Cardiovascular History: Reports: None


Respiratory History: Reports: None


Gastrointestinal History: Reports: GERD, Irritable Bowel Syndrome, Other (See 

Below)


Other Gastrointestinal History: IBS-Diarrhea


Genitourinary History: Reports: Other (See Below)


Other Genitourinary History: overactive bladder


OB/GYN History: Reports: Pregnancy


Musculoskeletal History: Reports: Arthritis


Neurological History: Reports: Other (See Below)


Other Neuro History: hx of motion sickness


Psychiatric History: Reports: None


Endocrine/Metabolic History: Reports: Hypothyroidism


Hematologic History: Reports: None


Immunologic History: Reports: Other (See Below)


Other Immunologic History: CREST syndrome


Oncologic (Cancer) History: Reports: None


Dermatologic History: Reports: None, Other (See Below)


Other Dermatologic History: Raynaud's Syndrome





- Infectious Disease History


Infectious Disease History: Reports: Chicken Pox, Measles, Mumps





- Past Surgical History


Head Surgeries/Procedures: Reports: None


HEENT Surgical History: Reports: Cataract Surgery


Female  Surgical History: Reports: Hysterectomy


Musculoskeletal Surgical History: Reports: Knee Replacement, ORIF, Shoulder 

Surgery





Social & Family History





- Family History


Family Medical History: Noncontributory


HEENT: Reports: Cataract


Respiratory: Reports: COPD


Musculoskeletal: Reports: Arthritis


Psychiatric: Reports: Depression


Endocrine/Metabolic: Reports: Diabetes, type II





- Tobacco Use


Smoking Status *Q: Never Smoker


Second Hand Smoke Exposure: No





- Caffeine Use


Caffeine Use: Reports: None





- Recreational Drug Use


Recreational Drug Use: No





H&P Review of Systems





- Review of Systems:


Review Of Systems: ROS reveals no pertinent complaints other than HPI.





Exam





- Exam


Exam: See Below





- Vital Signs


Vital Signs: 


 Last Vital Signs











Temp  36.6 C   11/01/19 15:32


 


Pulse  89   11/01/19 15:32


 


Resp  18   11/01/19 15:32


 


BP  124/72   11/01/19 15:32


 


Pulse Ox  96   11/01/19 15:32











Weight: 48.988 kg





- Patient Data


Lab Results Last 24 hrs: 


 Laboratory Results - last 24 hr











  11/01/19 11/01/19 Range/Units





  16:05 16:16 


 


WBC  19.73 H   (4.0-11.0)  K/uL


 


RBC  4.07 L   (4.30-5.90)  M/uL


 


Hgb  12.2   (12.0-16.0)  g/dL


 


Hct  36.6   (36.0-46.0)  %


 


MCV  89.9   (80.0-98.0)  fL


 


MCH  30.0   (27.0-32.0)  pg


 


MCHC  33.3   (31.0-37.0)  g/dL


 


RDW Std Deviation  43.3   (28.0-62.0)  fl


 


RDW Coeff of Rai  13   (11.0-15.0)  %


 


Plt Count  409 H   (150-400)  K/uL


 


MPV  9.30   (7.40-12.00)  fL


 


Neut % (Auto)  81.8 H   (48.0-80.0)  %


 


Lymph % (Auto)  11.0 L   (16.0-40.0)  %


 


Mono % (Auto)  5.1   (0.0-15.0)  %


 


Eos % (Auto)  1.9   (0.0-7.0)  %


 


Baso % (Auto)  0.2   (0.0-1.5)  %


 


Neut # (Auto)  16.2 H   (1.4-5.7)  K/uL


 


Lymph # (Auto)  2.2   (0.6-2.4)  K/uL


 


Mono # (Auto)  1.0 H   (0.0-0.8)  K/uL


 


Eos # (Auto)  0.4   (0.0-0.7)  K/uL


 


Baso # (Auto)  0.0   (0.0-0.1)  K/uL


 


Nucleated RBC %  0.0   /100WBC


 


Nucleated RBCs #  0   K/uL


 


Lactate   0.9  (0.20-2.00)  mmol/L











Result Diagrams: 


 11/01/19 16:05





 11/01/19 16:05


Jose J Results Last 24 hrs: 


 Microbiology











 11/01/19 16:16 Anaerobic Blood Culture - Final





 Blood - Venous - Lab Draw 


 


 11/01/19 16:05 Anaerobic Blood Culture - Final





 Blood - Venous 











Problem List Initiated/Reviewed/Updated: Yes


Orders Last 24hrs: 


 Active Orders 24 hr











 Category Date Time Status


 


 Patient Status [ADT] Stat ADT  11/01/19 17:14 Active


 


 Bedrest Bathroom Privileges [RC] ASDIRECTED Care  11/01/19 17:20 Active


 


 Intake and Output [RC] QSHIFT Care  11/01/19 17:20 Active


 


 Oxygen Therapy [RC] PRN Care  11/01/19 17:20 Active


 


 VTE/DVT Education [RC] PER UNIT ROUTINE Care  11/01/19 17:20 Active


 


 Vital Signs [RC] Q4H Care  11/01/19 17:20 Active


 


 Consult to Wound Care Services [CONS] Stat Cons  11/01/19 17:33 Active


 


 Regular Diet [DIET] Diet  11/01/19 Dinner Active


 


 Foot wo Cont Lt [MR] Stat Exams  11/01/19 17:29 Ordered


 


 Hand wo Cont Rt [MR] Stat Exams  11/01/19 17:31 Ordered


 


 COMPREHENSIVE METABOLIC PN,CMP [CHEM] Stat Lab  11/01/19 16:05 Received


 


 CULTURE BLOOD [BC] Stat Lab  11/01/19 16:05 Results


 


 CULTURE BLOOD [BC] Stat Lab  11/01/19 16:16 Results


 


 CULTURE WOUND [RM] Stat Lab  11/01/19 17:33 Ordered


 


 Acetaminophen [Tylenol] Med  11/01/19 17:20 Active





 650 mg PO Q4H PRN   


 


 Heparin Sodium Med  11/01/19 17:30 Active





 5,000 units SUBCUT Q8H   


 


 Ketorolac [Toradol] Med  11/01/19 17:27 Active





 15 mg IVPUSH Q6H PRN   


 


 Meropenem [Merrem] 1 gm Med  11/01/19 17:30 Active





 Sodium Chloride 0.9% [Normal Saline] 100 ml   





 IV Q8H   


 


 Pharmacy to Dose - Vancomycin Med  11/01/19 17:30 Pending





 1 dose .XX ASDIRECTED   


 


 Sodium Chloride 0.9% [Normal Saline] 1,000 ml Med  11/01/19 17:15 Active





 IV ASDIRECTED   


 


 Sodium Chloride 0.9% [Saline Flush] Med  11/01/19 16:46 Active





 10 ml FLUSH ASDIRECTED PRN   


 


 Sodium Chloride 0.9% [Saline Flush] Med  11/01/19 16:46 Active





 2.5 ml FLUSH ASDIRECTED PRN   


 


 Vancomycin 1 gm Med  11/01/19 16:43 Active





 Sodium Chloride 0.9% [Normal Saline (AdvBag)] 250 ml   





 IV ONETIME   


 


 Blood Culture x2 Reflex Set [OM.PC] Stat Oth  11/01/19 15:40 Ordered


 


 Saline Lock Insert [OM.PC] Stat Oth  11/01/19 16:46 Ordered


 


 Resuscitation Status Routine Resus Stat  11/01/19 17:20 Ordered








 Medication Orders





Acetaminophen (Tylenol)  650 mg PO Q4H PRN


   PRN Reason: Pain (Mild 1-3)/fever


Heparin Sodium (Porcine) (Heparin Sodium)  5,000 units SUBCUT Q8H HANSA


Vancomycin HCl 1 gm/ Sodium (Chloride)  250 mls @ 166 mls/hr IV ONETIME ONE


   Stop: 11/01/19 18:13


   Last Admin: 11/01/19 17:25  Dose: 166 mls/hr


Sodium Chloride (Normal Saline)  1,000 mls @ 125 mls/hr IV ASDIRECTED [facility]


   Last Admin: 11/01/19 17:25  Dose: 125 mls/hr


Meropenem 1 gm/ Sodium (Chloride)  100 mls @ 200 mls/hr IV Q8H HANSA


Ketorolac Tromethamine (Toradol)  15 mg IVPUSH Q6H PRN


   PRN Reason: Pain


Sodium Chloride (Saline Flush)  10 ml FLUSH ASDIRECTED PRN


   PRN Reason: Keep Vein Open


   Last Admin: 11/01/19 17:26  Dose: 10 ml


Sodium Chloride (Saline Flush)  2.5 ml FLUSH ASDIRECTED PRN


   PRN Reason: Keep Vein Open


   Last Admin: 11/01/19 17:25  Dose: 2.5 ml


Vancomycin HCl (Pharmacy To Dose - Vancomycin)  1 dose .XX ASDIRECTED [facility]








Assessment/Plan Comment:: 





A:


1. Suspected osteomyelitis


2. Decubitus ulcer


3. Hyponatremia


4. Leukocytosis


5. PMH Raynaud's syndrome, hypothyroidism





P:


1. Will treat with vancomycin and Meropenem since patient has allergy to 

penicillins.  In addition, will get MRI left foot and right hand to further 

assess for osteomyelitis. Pain management and wound care consult. Pending on 

imaging results, will make consult to ortho or surgery. Maintenance fluids at 

100 ml/hr. Will continue home meds for raynaud's and hypothyroidism.





dispo: 2-3 days

## 2019-11-01 NOTE — EDM.PDOC
ED HPI GENERAL MEDICAL PROBLEM





- General


Chief Complaint: Skin Complaint


Stated Complaint: FOOT INJURY


Time Seen by Provider: 11/01/19 15:25


Source of Information: Reports: Patient


History Limitations: Reports: No Limitations





- History of Present Illness


INITIAL COMMENTS - FREE TEXT/NARRATIVE: 


History of present illness:


[]Patient was wearing a new pair of shoes two weeks ago and developed a sore on 

her foot that has rapidly spread. She also has an ulcer on her buttock and 

right small finger. She denies any fevers, chills, nausea or vomiting.





Review of systems: 


As per history of present illness and below otherwise all systems reviewed and 

negative.





Past medical history: 


As per history of present illness and as reviewed below otherwise 

noncontributory.





Surgical history: 


As per history of present illness and as reviewed below otherwise 

noncontributory.





Social history: 


No reported history of drug or alcohol abuse.





Family history: 


As per history of present illness and as reviewed below otherwise 

noncontributory.





Physical exam:


General: Well developed, well nourished in NAD


HEENT: Atraumatic, normocephalic, pupils reactive, negative for conjunctival 

pallor or scleral icterus, mucous membranes moist, throat clear, neck supple, 

nontender, trachea midline.


Lungs: Clear to auscultation, breath sounds equal bilaterally, chest nontender.


Heart: S1S2, regular, negative for clicks, rubs, or JVD.


Abdomen: NABS, Soft, nondistended, nontender. Negative for masses or 

hepatosplenomegaly. Negative for costovertebral tenderness.


Pelvis: Stable nontender.


Genitourinary: Patient has grade 1 decubitus ulcer on her left buttock cheek.


Rectal: Deferred.


Extremities: Left foot with erythema, edema, areas of dried scabbed ulcerations 

on fourth toe, heel and base of great toe, she has a similar appearing erythema 

with a scabbed ulceration on her right small finger, negative for cords or calf 

pain. Neurovascular unremarkable.


Neuro: Awake, alert, oriented. Cranial nerves II through XII unremarkable. 

Cerebellum unremarkable. Motor and sensory unremarkable throughout. Exam 

nonfocal.


Skin:warm and dry





Diagnostics:


CBC, cultures, chemistry, lactic acid, x-ray left foot, right hand 





Therapeutics:


IV hydration, vancomycin Toradol, morphine





ED Course:


Stable





Impression: 


Cellulitis with possible osteomyelitis of left foot and right hand





Prescriptions:


None





Plan:


Admit to hospitalist for IV antibiotics for pain control and further workup.





Definitive disposition and diagnosis as appropriate pending reevaluation and 

review of above.





  ** Left Feet


Pain Score (Numeric/FACES): 9





- Related Data


 Allergies











Allergy/AdvReac Type Severity Reaction Status Date / Time


 


codeine Allergy  Vomiting Verified 11/01/19 15:30


 


latex Allergy  Rash Verified 11/01/19 15:30


 


morphine Allergy  Difficulty Verified 11/01/19 15:30





   Breathing  


 


oxycodone HCl [From Percocet] Allergy  Vomiting Verified 11/01/19 15:30


 


Penicillins Allergy  Anaphylactic Verified 11/01/19 15:30





   Shock  


 


Sulfa (Sulfonamide Allergy  Anaphylactic Verified 11/01/19 15:30





Antibiotics)   Shock  


 


all pain meds Allergy  Nausea Uncoded 11/01/19 15:30











Home Meds: 


 Home Meds





Omeprazole Magnesium [Prilosec Otc] 20 mg PO ACBRK 12/30/14 [History]


Promethazine [Phenergan] 25 mg PO Q6H PRN #30 tablet 01/02/15 [Rx]


Denosumab [Prolia] 60 mg IM Q180D 07/28/16 [History]


Levothyroxine [Synthroid] 88 mcg PO ACBREAKFAST 07/28/16 [History]


Ondansetron HCl [Zofran] 4 mg PO Q8HR PRN #16 tablet 07/17/17 [Rx]


traMADol [Ultram] 100 mg PO Q6H PRN #80 tablet 07/22/17 [Rx]


Acetaminophen [Tylenol Extra Strength] 1,000 mg PO Q6H PRN  tablet 08/31/17 [Rx]


traMADol [Ultram] 100 mg PO Q6H PRN #80 tablet 09/03/17 [Rx]


Bisacodyl 10 mg RC DAILY PRN 11/01/19 [History]


Cetirizine HCl 10 mg PO BEDTIME 11/01/19 [History]


Loperamide HCl [Imodium A-D] 4 mg PO Q6H PRN 11/01/19 [History]


Oxybutynin Chloride 5 mg PO BID 11/01/19 [History]


Pedi Mv No.79/Ferrous Fumarate [Flintstones with Iron Tab Chew] 1 tab PO DAILY 

11/01/19 [History]


amLODIPine Besylate [Norvasc] 2.5 mg PO DAILY 11/01/19 [History]











Past Medical History





- Past Health History


Medical/Surgical History: Denies Medical/Surgical History


HEENT History: Reports: Cataract


Other HEENT History: has upper denture and lower partial


Cardiovascular History: Reports: None


Respiratory History: Reports: None


Gastrointestinal History: Reports: GERD, Irritable Bowel Syndrome, Other (See 

Below)


Other Gastrointestinal History: IBS-Diarrhea


Genitourinary History: Reports: Other (See Below)


Other Genitourinary History: overactive bladder


OB/GYN History: Reports: Pregnancy


Musculoskeletal History: Reports: Arthritis


Neurological History: Reports: Other (See Below)


Other Neuro History: hx of motion sickness


Psychiatric History: Reports: None


Endocrine/Metabolic History: Reports: Hypothyroidism


Hematologic History: Reports: None


Immunologic History: Reports: Other (See Below)


Other Immunologic History: CREST syndrome


Oncologic (Cancer) History: Reports: None


Dermatologic History: Reports: None, Other (See Below)


Other Dermatologic History: Raynaud's Syndrome





- Infectious Disease History


Infectious Disease History: Reports: Chicken Pox, Measles, Mumps





- Past Surgical History


Head Surgeries/Procedures: Reports: None


HEENT Surgical History: Reports: Cataract Surgery


Female  Surgical History: Reports: Hysterectomy


Musculoskeletal Surgical History: Reports: Knee Replacement, ORIF, Shoulder 

Surgery





Social & Family History





- Family History


Family Medical History: Noncontributory


HEENT: Reports: Cataract


Respiratory: Reports: COPD


Musculoskeletal: Reports: Arthritis


Psychiatric: Reports: Depression


Endocrine/Metabolic: Reports: Diabetes, type II





- Tobacco Use


Smoking Status *Q: Never Smoker


Second Hand Smoke Exposure: No





- Caffeine Use


Caffeine Use: Reports: None





- Recreational Drug Use


Recreational Drug Use: No





ED ROS GENERAL





- Review of Systems


Review Of Systems: See Below





ED EXAM, SKIN/RASH


Exam: See Below





Course





- Vital Signs


Last Recorded V/S: 


 Last Vital Signs











Temp  99.1 F   11/02/19 04:00


 


Pulse  102 H  11/02/19 04:00


 


Resp  17   11/02/19 04:00


 


BP  149/73 H  11/02/19 04:00


 


Pulse Ox  94 L  11/02/19 04:00














- Orders/Labs/Meds


Orders: 


 Active Orders 24 hr











 Category Date Time Status


 


 Patient Status [ADT] Stat ADT  11/01/19 17:14 Active


 


 Bedrest Bathroom Privileges [RC] ASDIRECTED Care  11/01/19 17:20 Active


 


 Intake and Output [RC] QSHIFT Care  11/01/19 17:20 Active


 


 Oxygen Therapy [RC] PRN Care  11/01/19 17:20 Active


 


 VTE/DVT Education [RC] PER UNIT ROUTINE Care  11/01/19 17:20 Active


 


 Vital Signs [RC] Q4H Care  11/01/19 17:20 Active


 


 Consult to Wound Care Services [CONS] Stat Cons  11/01/19 17:33 Active


 


 Regular Diet [DIET] Diet  11/01/19 Dinner Active


 


 CULTURE BLOOD [BC] Stat Lab  11/01/19 16:05 Results


 


 CULTURE BLOOD [BC] Stat Lab  11/01/19 16:16 Results


 


 CULTURE WOUND [RM] Stat Lab  11/01/19 17:33 Ordered


 


 Acetaminophen [Tylenol] Med  11/01/19 17:20 Active





 650 mg PO Q4H PRN   


 


 Ketorolac [Toradol] Med  11/01/19 17:27 Active





 15 mg IVPUSH Q6H PRN   


 


 Pharmacy to Dose - Vancomycin Med  11/01/19 17:30 Pending





 1 dose .XX ASDIRECTED   


 


 Sodium Chloride 0.9% [Normal Saline] 1,000 ml Med  11/01/19 17:15 Active





 IV ASDIRECTED   


 


 Sodium Chloride 0.9% [Saline Flush] Med  11/01/19 16:46 Active





 10 ml FLUSH ASDIRECTED PRN   


 


 Sodium Chloride 0.9% [Saline Flush] Med  11/01/19 16:46 Active





 2.5 ml FLUSH ASDIRECTED PRN   


 


 Blood Culture x2 Reflex Set [OM.PC] Stat Oth  11/01/19 15:40 Ordered


 


 Saline Lock Insert [OM.PC] Stat Oth  11/01/19 16:46 Ordered


 


 Resuscitation Status Routine Resus Stat  11/01/19 17:20 Ordered








 Medication Orders





Acetaminophen (Tylenol)  650 mg PO Q4H PRN


   PRN Reason: Pain (Mild 1-3)/fever


   Last Admin: 11/02/19 04:09  Dose: 650 mg


   Admin: 11/01/19 22:25  Dose: 650 mg


Amlodipine Besylate (Norvasc)  10 mg PO DAILY [facility]


Enoxaparin Sodium (Lovenox)  40 mg SUBCUT Q24H [facility]


   Last Admin: 11/01/19 19:33  Dose: 40 mg


Sodium Chloride (Normal Saline)  1,000 mls @ 125 mls/hr IV ASDIRECTED [facility]


   Last Admin: 11/02/19 03:58  Dose: 125 mls/hr


   Infusion: 11/02/19 03:25  Dose: 125 mls/hr


   Admin: 11/01/19 19:25  Dose: 125 mls/hr


   Infusion: 11/01/19 19:25  Dose: 125 mls/hr


   Admin: 11/01/19 17:25  Dose: 125 mls/hr


Meropenem 1 gm/ Sodium (Chloride)  100 mls @ 200 mls/hr IV Q8H [facility]


   Last Admin: 11/02/19 04:03  Dose: 200 mls/hr


   Infusion: 11/01/19 21:02  Dose: 200 mls/hr


   Admin: 11/01/19 20:32  Dose: 200 mls/hr


Vancomycin HCl 0.75 gm/ Sodium (Chloride)  250 mls @ 166.667 mls/hr IV Q24H [facility]


Ketorolac Tromethamine (Toradol)  15 mg IVPUSH Q6H PRN


   PRN Reason: Pain


   Last Admin: 11/02/19 01:34  Dose: 15 mg


   Admin: 11/01/19 19:34  Dose: 15 mg


Levothyroxine Sodium (Synthroid)  88 mcg PO ACBREAKFAST [facility]


   Last Admin: 11/02/19 06:32  Dose: 88 mcg


Promethazine HCl (Phenergan)  25 mg PO Q6H PRN


   PRN Reason: Nausea/Vomiting


   Last Admin: 11/02/19 07:00  Dose: 25 mg


Sodium Chloride (Saline Flush)  10 ml FLUSH ASDIRECTED PRN


   PRN Reason: Keep Vein Open


   Last Admin: 11/01/19 17:26  Dose: 10 ml


Sodium Chloride (Saline Flush)  2.5 ml FLUSH ASDIRECTED PRN


   PRN Reason: Keep Vein Open


   Last Admin: 11/01/19 17:25  Dose: 2.5 ml


Tramadol HCl (Ultram)  100 mg PO Q8H PRN


   PRN Reason: Pain


   Last Admin: 11/02/19 07:00  Dose: 100 mg


Vancomycin HCl (Pharmacy To Dose - Vancomycin)  1 dose .XX ASDIRECTED [facility]








Labs: 


 Laboratory Tests











  11/01/19 11/01/19 11/01/19 Range/Units





  16:05 16:05 16:05 


 


WBC  19.73 H    (4.0-11.0)  K/uL


 


RBC  4.07 L    (4.30-5.90)  M/uL


 


Hgb  12.2    (12.0-16.0)  g/dL


 


Hct  36.6    (36.0-46.0)  %


 


MCV  89.9    (80.0-98.0)  fL


 


MCH  30.0    (27.0-32.0)  pg


 


MCHC  33.3    (31.0-37.0)  g/dL


 


RDW Std Deviation  43.3    (28.0-62.0)  fl


 


RDW Coeff of Rai  13    (11.0-15.0)  %


 


Plt Count  409 H    (150-400)  K/uL


 


MPV  9.30    (7.40-12.00)  fL


 


Neut % (Auto)  81.8 H    (48.0-80.0)  %


 


Lymph % (Auto)  11.0 L    (16.0-40.0)  %


 


Mono % (Auto)  5.1    (0.0-15.0)  %


 


Eos % (Auto)  1.9    (0.0-7.0)  %


 


Baso % (Auto)  0.2    (0.0-1.5)  %


 


Neut # (Auto)  16.2 H    (1.4-5.7)  K/uL


 


Lymph # (Auto)  2.2    (0.6-2.4)  K/uL


 


Mono # (Auto)  1.0 H    (0.0-0.8)  K/uL


 


Eos # (Auto)  0.4    (0.0-0.7)  K/uL


 


Baso # (Auto)  0.0    (0.0-0.1)  K/uL


 


Nucleated RBC %  0.0    /100WBC


 


Nucleated RBCs #  0    K/uL


 


ESR     (0-29)  mm/hr


 


Lactate     (0.20-2.00)  mmol/L


 


Sodium   128 L   (136-145)  mmol/L


 


Potassium   4.6   (3.5-5.1)  mmol/L


 


Chloride   96 L   ()  mmol/L


 


Carbon Dioxide   16.7 L   (21.0-32.0)  mmol/L


 


BUN   16   (7.0-18.0)  mg/dL


 


Creatinine   0.8   (0.6-1.0)  mg/dL


 


Est Cr Clr Drug Dosing   40.21   mL/min


 


Estimated GFR (MDRD)   > 60.0   ml/min


 


Glucose   82   ()  mg/dL


 


Hemoglobin A1c    5.5  (4.5-6.2)  %


 


Calcium   8.8   (8.5-10.1)  mg/dL


 


Total Bilirubin   0.6   (0.2-1.0)  mg/dL


 


AST   27   (15-37)  IU/L


 


ALT   15   (14-63)  IU/L


 


Alkaline Phosphatase   158 H   ()  U/L


 


C-Reactive Protein     (0.00-0.90)  mg/dL


 


Total Protein   6.8   (6.4-8.2)  g/dL


 


Albumin   2.8 L   (3.4-5.0)  g/dL


 


Globulin   4.0   (2.6-4.0)  g/dL


 


Albumin/Globulin Ratio   0.7 L   (0.9-1.6)  














  11/01/19 11/01/19 11/01/19 Range/Units





  16:16 16:16 16:16 


 


WBC     (4.0-11.0)  K/uL


 


RBC     (4.30-5.90)  M/uL


 


Hgb     (12.0-16.0)  g/dL


 


Hct     (36.0-46.0)  %


 


MCV     (80.0-98.0)  fL


 


MCH     (27.0-32.0)  pg


 


MCHC     (31.0-37.0)  g/dL


 


RDW Std Deviation     (28.0-62.0)  fl


 


RDW Coeff of Rai     (11.0-15.0)  %


 


Plt Count     (150-400)  K/uL


 


MPV     (7.40-12.00)  fL


 


Neut % (Auto)     (48.0-80.0)  %


 


Lymph % (Auto)     (16.0-40.0)  %


 


Mono % (Auto)     (0.0-15.0)  %


 


Eos % (Auto)     (0.0-7.0)  %


 


Baso % (Auto)     (0.0-1.5)  %


 


Neut # (Auto)     (1.4-5.7)  K/uL


 


Lymph # (Auto)     (0.6-2.4)  K/uL


 


Mono # (Auto)     (0.0-0.8)  K/uL


 


Eos # (Auto)     (0.0-0.7)  K/uL


 


Baso # (Auto)     (0.0-0.1)  K/uL


 


Nucleated RBC %     /100WBC


 


Nucleated RBCs #     K/uL


 


ESR   44 H   (0-29)  mm/hr


 


Lactate  0.9    (0.20-2.00)  mmol/L


 


Sodium     (136-145)  mmol/L


 


Potassium     (3.5-5.1)  mmol/L


 


Chloride     ()  mmol/L


 


Carbon Dioxide     (21.0-32.0)  mmol/L


 


BUN     (7.0-18.0)  mg/dL


 


Creatinine     (0.6-1.0)  mg/dL


 


Est Cr Clr Drug Dosing     mL/min


 


Estimated GFR (MDRD)     ml/min


 


Glucose     ()  mg/dL


 


Hemoglobin A1c     (4.5-6.2)  %


 


Calcium     (8.5-10.1)  mg/dL


 


Total Bilirubin     (0.2-1.0)  mg/dL


 


AST     (15-37)  IU/L


 


ALT     (14-63)  IU/L


 


Alkaline Phosphatase     ()  U/L


 


C-Reactive Protein    10.80 H  (0.00-0.90)  mg/dL


 


Total Protein     (6.4-8.2)  g/dL


 


Albumin     (3.4-5.0)  g/dL


 


Globulin     (2.6-4.0)  g/dL


 


Albumin/Globulin Ratio     (0.9-1.6)  











Meds: 


Medications











Generic Name Dose Route Start Last Admin





  Trade Name Freq  PRN Reason Stop Dose Admin


 


Acetaminophen  650 mg  11/01/19 17:20  11/02/19 04:09





  Tylenol  PO   650 mg





  Q4H PRN   Administration





  Pain (Mild 1-3)/fever   





     





     





     


 


Amlodipine Besylate  10 mg  11/02/19 09:00  





  Norvasc  PO   





  DAILY [facility]   





     





     





     





     


 


Enoxaparin Sodium  40 mg  11/01/19 19:15  11/01/19 19:33





  Lovenox  SUBCUT   40 mg





  Q24H [facility]   Administration





     





     





     





     


 


Sodium Chloride  1,000 mls @ 125 mls/hr  11/01/19 17:15  11/02/19 03:58





  Normal Saline  IV   125 mls/hr





  ASDIRECTED [facility]   Administration





     





     





     





     


 


Meropenem 1 gm/ Sodium  100 mls @ 200 mls/hr  11/01/19 20:00  11/02/19 04:03





  Chloride  IV   200 mls/hr





  Q8H [facility]   Administration





     





     





     





     


 


Vancomycin HCl 0.75 gm/ Sodium  250 mls @ 166.667 mls/hr  11/02/19 17:00  





  Chloride  IV   





  Q24H [facility]   





     





     





     





     


 


Ketorolac Tromethamine  15 mg  11/01/19 17:27  11/02/19 01:34





  Toradol  IVPUSH   15 mg





  Q6H PRN   Administration





  Pain   





     





     





     


 


Levothyroxine Sodium  88 mcg  11/02/19 07:30  11/02/19 06:32





  Synthroid  PO   88 mcg





  ACBREAKFAST [facility]   Administration





     





     





     





     


 


Promethazine HCl  25 mg  11/01/19 19:08  11/02/19 07:00





  Phenergan  PO   25 mg





  Q6H PRN   Administration





  Nausea/Vomiting   





     





     





     


 


Sodium Chloride  10 ml  11/01/19 16:46  11/01/19 17:26





  Saline Flush  FLUSH   10 ml





  ASDIRECTED PRN   Administration





  Keep Vein Open   





     





     





     


 


Sodium Chloride  2.5 ml  11/01/19 16:46  11/01/19 17:25





  Saline Flush  FLUSH   2.5 ml





  ASDIRECTED PRN   Administration





  Keep Vein Open   





     





     





     


 


Tramadol HCl  100 mg  11/02/19 06:47  11/02/19 07:00





  Ultram  PO   100 mg





  Q8H PRN   Administration





  Pain   





     





     





     


 


Vancomycin HCl  1 dose  11/01/19 17:30  





  Pharmacy To Dose - Vancomycin  .XX   





  ASDIRECTED [facility]   





     





     





     





     














Discontinued Medications














Generic Name Dose Route Start Last Admin





  Trade Name Freq  PRN Reason Stop Dose Admin


 


Heparin Sodium (Porcine)  5,000 units  11/01/19 17:30  11/01/19 19:58





  Heparin Sodium  SUBCUT   Not Given





  Q8H [facility]   





     





     





     





     


 


Hydromorphone HCl  0.5 mg  11/01/19 17:22  11/01/19 17:28





  Dilaudid  IVPUSH  11/01/19 17:23  0.5 mg





  ONETIME ONE   Administration





     





     





     





     


 


Vancomycin HCl 1 gm/ Sodium  250 mls @ 166 mls/hr  11/01/19 16:43  11/01/19 17:

25





  Chloride  IV  11/01/19 18:13  166 mls/hr





  ONETIME ONE   Administration





     





     





     





     


 


Meropenem 1 gm/ Sodium  100 mls @ 200 mls/hr  11/01/19 17:30  11/01/19 19:59





  Chloride  IV   Not Given





  Q8H [facility]   





     





     





     





     


 


Ketorolac Tromethamine  15 mg  11/01/19 17:20  11/01/19 17:28





  Toradol  IVPUSH  11/01/19 17:21  15 mg





  ONETIME ONE   Administration





     





     





     





     


 


Ondansetron HCl  4 mg  11/01/19 17:22  11/01/19 17:28





  Zofran  IVPUSH  11/01/19 17:23  4 mg





  ONETIME ONE   Administration





     





     





     





     


 


Ondansetron HCl  4 mg  11/01/19 17:24  11/01/19 17:35





  Zofran  IVPUSH  11/01/19 17:25  Not Given





  ONETIME ONE   





     





     





     





     














Departure





- Departure


Time of Disposition: 18:50


Disposition: Admitted As Inpatient 67


Condition: Good


Clinical Impression: 


Cellulitis


Qualifiers:


 Site of cellulitis: unspecified site Qualified Code(s): L03.90 - Cellulitis, 

unspecified








- Discharge Information


*PRESCRIPTION DRUG MONITORING PROGRAM REVIEWED*: No


*COPY OF PRESCRIPTION DRUG MONITORING REPORT IN PATIENT TG: No





- My Orders


Last 24 Hours: 


My Active Orders





11/01/19 15:40


Blood Culture x2 Reflex Set [OM.PC] Stat 





11/01/19 16:05


CULTURE BLOOD [BC] Stat 





11/01/19 16:16


CULTURE BLOOD [BC] Stat 





11/01/19 16:46


Sodium Chloride 0.9% [Saline Flush]   10 ml FLUSH ASDIRECTED PRN 


Sodium Chloride 0.9% [Saline Flush]   2.5 ml FLUSH ASDIRECTED PRN 


Saline Lock Insert [OM.PC] Stat 





11/01/19 17:14


Patient Status [ADT] Stat 





11/01/19 17:15


Sodium Chloride 0.9% [Normal Saline] 1,000 ml IV ASDIRECTED 














- Assessment/Plan


Last 24 Hours: 


My Active Orders





11/01/19 15:40


Blood Culture x2 Reflex Set [OM.PC] Stat 





11/01/19 16:05


CULTURE BLOOD [BC] Stat 





11/01/19 16:16


CULTURE BLOOD [BC] Stat 





11/01/19 16:46


Sodium Chloride 0.9% [Saline Flush]   10 ml FLUSH ASDIRECTED PRN 


Sodium Chloride 0.9% [Saline Flush]   2.5 ml FLUSH ASDIRECTED PRN 


Saline Lock Insert [OM.PC] Stat 





11/01/19 17:14


Patient Status [ADT] Stat 





11/01/19 17:15


Sodium Chloride 0.9% [Normal Saline] 1,000 ml IV ASDIRECTED

## 2019-11-02 LAB
BUN SERPL-MCNC: 12 MG/DL (ref 7–18)
CHLORIDE SERPL-SCNC: 105 MMOL/L (ref 98–107)
CO2 SERPL-SCNC: 22.6 MMOL/L (ref 21–32)
GLUCOSE SERPL-MCNC: 78 MG/DL (ref 74–106)
POTASSIUM SERPL-SCNC: 4 MMOL/L (ref 3.5–5.1)
SODIUM SERPL-SCNC: 139 MMOL/L (ref 136–145)

## 2019-11-02 RX ADMIN — KETOROLAC TROMETHAMINE PRN MG: 30 INJECTION, SOLUTION INTRAMUSCULAR at 08:45

## 2019-11-02 RX ADMIN — MEROPENEM AND SODIUM CHLORIDE SCH MLS/HR: 1 INJECTION, SOLUTION INTRAVENOUS at 20:21

## 2019-11-02 RX ADMIN — KETOROLAC TROMETHAMINE PRN MG: 30 INJECTION, SOLUTION INTRAMUSCULAR at 21:52

## 2019-11-02 RX ADMIN — KETOROLAC TROMETHAMINE PRN MG: 30 INJECTION, SOLUTION INTRAMUSCULAR at 15:29

## 2019-11-02 RX ADMIN — MEROPENEM AND SODIUM CHLORIDE SCH MLS/HR: 1 INJECTION, SOLUTION INTRAVENOUS at 11:23

## 2019-11-02 RX ADMIN — KETOROLAC TROMETHAMINE PRN MG: 30 INJECTION, SOLUTION INTRAMUSCULAR at 01:34

## 2019-11-02 NOTE — PCM.CONS
H&P History of Present Illness





- General


Date of Service: 11/02/19


Admit Problem/Dx: 


 Admission Diagnosis/Problem





Admission Diagnosis/Problem      Cellulitis





Left heel, ball of foot and left fourth toe ulcers


Source of Information: Patient


History Limitations: Reports: No Limitations





- History of Present Illness


Duration of Symptoms: Reports: Week(s):


Location: Reports: Lower Extremity, Left


Quality: Reports: Pressure, Throbbing


Severity: Moderate


Improves with: Reports: Rest


Worsens with: Reports: Movement


Associated Symptoms: Denies: Fever/Chills


  ** Left Feet


Pain Score (Numeric/FACES): 9





- Related Data


Allergies/Adverse Reactions: 


 Allergies











Allergy/AdvReac Type Severity Reaction Status Date / Time


 


codeine Allergy  Vomiting Verified 11/01/19 15:30


 


latex Allergy  Rash Verified 11/01/19 15:30


 


morphine Allergy  Difficulty Verified 11/01/19 15:30





   Breathing  


 


oxycodone HCl [From Percocet] Allergy  Vomiting Verified 11/01/19 15:30


 


Penicillins Allergy  Anaphylactic Verified 11/01/19 15:30





   Shock  


 


Sulfa (Sulfonamide Allergy  Anaphylactic Verified 11/01/19 15:30





Antibiotics)   Shock  


 


all pain meds Allergy  Nausea Uncoded 11/01/19 15:30











Home Medications: 


 Home Meds





Omeprazole Magnesium [Prilosec Otc] 20 mg PO ACBRK 12/30/14 [History]


Promethazine [Phenergan] 25 mg PO Q6H PRN #30 tablet 01/02/15 [Rx]


Denosumab [Prolia] 60 mg IM Q180D 07/28/16 [History]


Levothyroxine [Synthroid] 88 mcg PO ACBREAKFAST 07/28/16 [History]


Ondansetron HCl [Zofran] 4 mg PO Q8HR PRN #16 tablet 07/17/17 [Rx]


traMADol [Ultram] 100 mg PO Q6H PRN #80 tablet 07/22/17 [Rx]


Acetaminophen [Tylenol Extra Strength] 1,000 mg PO Q6H PRN  tablet 08/31/17 [Rx]


Bisacodyl 10 mg RC DAILY PRN 11/01/19 [History]


Cetirizine HCl 10 mg PO BEDTIME 11/01/19 [History]


Loperamide HCl [Imodium A-D] 4 mg PO Q6H PRN 11/01/19 [History]


Oxybutynin Chloride 5 mg PO BID 11/01/19 [History]


Pedi Mv No.79/Ferrous Fumarate [Flintstones with Iron Tab Chew] 1 tab PO DAILY 

11/01/19 [History]


amLODIPine Besylate [Norvasc] 2.5 mg PO DAILY 11/01/19 [History]











Past Medical History





- Past Health History


Medical/Surgical History: Denies Medical/Surgical History


HEENT History: Reports: Cataract


Other HEENT History: has upper denture and lower partial


Cardiovascular History: Reports: None


Respiratory History: Reports: None


Gastrointestinal History: Reports: GERD, Irritable Bowel Syndrome, Other (See 

Below)


Other Gastrointestinal History: IBS-Diarrhea


Genitourinary History: Reports: Other (See Below)


Other Genitourinary History: overactive bladder


OB/GYN History: Reports: Pregnancy


Musculoskeletal History: Reports: Arthritis


Neurological History: Reports: Other (See Below)


Other Neuro History: hx of motion sickness


Psychiatric History: Reports: None


Endocrine/Metabolic History: Reports: Hypothyroidism


Hematologic History: Reports: None


Immunologic History: Reports: Other (See Below)


Other Immunologic History: CREST syndrome


Oncologic (Cancer) History: Reports: None


Dermatologic History: Reports: None, Other (See Below)


Other Dermatologic History: Raynaud's Syndrome





- Infectious Disease History


Infectious Disease History: Reports: Chicken Pox, Measles, Mumps





- Past Surgical History


Head Surgeries/Procedures: Reports: None


HEENT Surgical History: Reports: Cataract Surgery


Female  Surgical History: Reports: Hysterectomy


Musculoskeletal Surgical History: Reports: Knee Replacement, ORIF, Shoulder 

Surgery





Social & Family History





- Family History


Family Medical History: Noncontributory


HEENT: Reports: Cataract


Respiratory: Reports: COPD


Musculoskeletal: Reports: Arthritis


Psychiatric: Reports: Depression


Endocrine/Metabolic: Reports: Diabetes, type II





- Tobacco Use


Smoking Status *Q: Never Smoker


Years of Tobacco use: 40


Used Tobacco, but Quit: Yes


Month/Year Tobacco Last Used: 45 years ago


Second Hand Smoke Exposure: No





- Caffeine Use


Caffeine Use: Reports: None





- Recreational Drug Use


Recreational Drug Use: No





H&P Review of Systems





- Review of Systems:


Review Of Systems: See Below


General: Reports: Weakness.  Denies: Fever, Chills, Malaise, Fatigue


HEENT: Reports: No Symptoms


Pulmonary: Denies: Shortness of Breath, Wheezing


Cardiovascular: Denies: Chest Pain, Palpitations


Gastrointestinal: Denies: Abdominal Pain, Anorexia, Black Stool, Bloody Stool


Genitourinary: Denies: Dysuria, Frequency, Burning, Pain


Musculoskeletal: Reports: Other (Left heel, ball of foot and fourth toe 

ulcerations with  infection.)


Skin: Reports: Pallor, Erythema, Wound.  Denies: Cyanosis, Jaundice, Diaphoresis

, Dryness, Bruising


Psychiatric: Denies: Confusion, Depression, Mood Lability, Anxiety


Neurological: Denies: Confusion, Dizziness, Headache, Numbness


Hematologic/Lymphatic: Reports: No Symptoms


Immunologic: Reports: No Symptoms





Exam





- Exam


Exam: See Below





- Vital Signs


Vital Signs: 


 Last Vital Signs











Temp  99.1 F   11/02/19 07:31


 


Pulse  100   11/02/19 07:31


 


Resp  18   11/02/19 07:31


 


BP  140/70   11/02/19 08:44


 


Pulse Ox  94 L  11/02/19 07:31











Weight: 108 lb





- Exam


Quality Assessment: No: Supplemental Oxygen, Central Line/PICC, Urinary Catheter


General: Alert, Oriented, Cooperative, Mild Distress


HEENT: Conjunctiva Clear, EACs Clear, EOMI, Nares Patent, Pupils Equal, Pupils 

Reactive


Neck: Supple, Trachea Midline


Lungs: Clear to Auscultation, Normal Respiratory Effort


Cardiovascular: Regular Rate, Regular Rhythm, Normal S1, Normal S2


GI/Abdominal Exam: Normal Bowel Sounds, Soft, Non-Tender, No Organomegaly, No 

Distention, No Abnormal Bruit, No Mass, Pelvis Stable


 (Female) Exam: Deferred


Rectal (Female) Exam: Deferred


Back Exam: Normal Inspection


Extremities: Other (Left foot is mildly edematous.  She has a large left heel 

ulcer, 3 ulcers on the bottom of her foot and a left fourth toe ulceration.)


Peripheral Pulses: 3+: Posterior Tibial (L), Posterior Tibial (R), Dorsalis 

Pedis (L), Dorsalis Pedis (R)


Skin: Warm, Dry, Decubitis (left heel, ball of left foot)


Neuro Extensive - Mental Status: Alert, Oriented x3, Normal Mood/Affect, Normal 

Cognition


Psychiatric: Alert, Normal Affect, Normal Mood





- Patient Data


Lab Results Last 24 hrs: 


 Laboratory Results - last 24 hr











  11/01/19 11/01/19 11/01/19 Range/Units





  16:05 16:05 16:05 


 


WBC  19.73 H    (4.0-11.0)  K/uL


 


RBC  4.07 L    (4.30-5.90)  M/uL


 


Hgb  12.2    (12.0-16.0)  g/dL


 


Hct  36.6    (36.0-46.0)  %


 


MCV  89.9    (80.0-98.0)  fL


 


MCH  30.0    (27.0-32.0)  pg


 


MCHC  33.3    (31.0-37.0)  g/dL


 


RDW Std Deviation  43.3    (28.0-62.0)  fl


 


RDW Coeff of Rai  13    (11.0-15.0)  %


 


Plt Count  409 H    (150-400)  K/uL


 


MPV  9.30    (7.40-12.00)  fL


 


Neut % (Auto)  81.8 H    (48.0-80.0)  %


 


Lymph % (Auto)  11.0 L    (16.0-40.0)  %


 


Mono % (Auto)  5.1    (0.0-15.0)  %


 


Eos % (Auto)  1.9    (0.0-7.0)  %


 


Baso % (Auto)  0.2    (0.0-1.5)  %


 


Neut # (Auto)  16.2 H    (1.4-5.7)  K/uL


 


Lymph # (Auto)  2.2    (0.6-2.4)  K/uL


 


Mono # (Auto)  1.0 H    (0.0-0.8)  K/uL


 


Eos # (Auto)  0.4    (0.0-0.7)  K/uL


 


Baso # (Auto)  0.0    (0.0-0.1)  K/uL


 


Nucleated RBC %  0.0    /100WBC


 


Nucleated RBCs #  0    K/uL


 


ESR     (0-29)  mm/hr


 


Lactate     (0.20-2.00)  mmol/L


 


Sodium   128 L   (136-145)  mmol/L


 


Potassium   4.6   (3.5-5.1)  mmol/L


 


Chloride   96 L   ()  mmol/L


 


Carbon Dioxide   16.7 L   (21.0-32.0)  mmol/L


 


BUN   16   (7.0-18.0)  mg/dL


 


Creatinine   0.8   (0.6-1.0)  mg/dL


 


Est Cr Clr Drug Dosing   40.21   mL/min


 


Estimated GFR (MDRD)   > 60.0   ml/min


 


Glucose   82   ()  mg/dL


 


Hemoglobin A1c    5.5  (4.5-6.2)  %


 


Calcium   8.8   (8.5-10.1)  mg/dL


 


Total Bilirubin   0.6   (0.2-1.0)  mg/dL


 


AST   27   (15-37)  IU/L


 


ALT   15   (14-63)  IU/L


 


Alkaline Phosphatase   158 H   ()  U/L


 


C-Reactive Protein     (0.00-0.90)  mg/dL


 


Total Protein   6.8   (6.4-8.2)  g/dL


 


Albumin   2.8 L   (3.4-5.0)  g/dL


 


Globulin   4.0   (2.6-4.0)  g/dL


 


Albumin/Globulin Ratio   0.7 L   (0.9-1.6)  


 


Urine Color     


 


Urine Appearance     


 


Urine pH     (5.0-8.0)  


 


Ur Specific Gravity     (1.001-1.035)  


 


Urine Protein     (NEGATIVE)  mg/dL


 


Urine Glucose (UA)     (NEGATIVE)  mg/dL


 


Urine Ketones     (NEGATIVE)  mg/dL


 


Urine Occult Blood     (NEGATIVE)  


 


Urine Nitrite     (NEGATIVE)  


 


Urine Bilirubin     (NEGATIVE)  


 


Urine Urobilinogen     (<2.0)  EU/dL


 


Ur Leukocyte Esterase     (NEGATIVE)  


 


Urine RBC     (0-2/HPF)  


 


Urine WBC     (0-5/HPF)  


 


Ur Epithelial Cells     (NONE-FEW)  


 


Urine Bacteria     (NEGATIVE)  














  11/01/19 11/01/19 11/01/19 Range/Units





  16:16 16:16 16:16 


 


WBC     (4.0-11.0)  K/uL


 


RBC     (4.30-5.90)  M/uL


 


Hgb     (12.0-16.0)  g/dL


 


Hct     (36.0-46.0)  %


 


MCV     (80.0-98.0)  fL


 


MCH     (27.0-32.0)  pg


 


MCHC     (31.0-37.0)  g/dL


 


RDW Std Deviation     (28.0-62.0)  fl


 


RDW Coeff of Rai     (11.0-15.0)  %


 


Plt Count     (150-400)  K/uL


 


MPV     (7.40-12.00)  fL


 


Neut % (Auto)     (48.0-80.0)  %


 


Lymph % (Auto)     (16.0-40.0)  %


 


Mono % (Auto)     (0.0-15.0)  %


 


Eos % (Auto)     (0.0-7.0)  %


 


Baso % (Auto)     (0.0-1.5)  %


 


Neut # (Auto)     (1.4-5.7)  K/uL


 


Lymph # (Auto)     (0.6-2.4)  K/uL


 


Mono # (Auto)     (0.0-0.8)  K/uL


 


Eos # (Auto)     (0.0-0.7)  K/uL


 


Baso # (Auto)     (0.0-0.1)  K/uL


 


Nucleated RBC %     /100WBC


 


Nucleated RBCs #     K/uL


 


ESR   44 H   (0-29)  mm/hr


 


Lactate  0.9    (0.20-2.00)  mmol/L


 


Sodium     (136-145)  mmol/L


 


Potassium     (3.5-5.1)  mmol/L


 


Chloride     ()  mmol/L


 


Carbon Dioxide     (21.0-32.0)  mmol/L


 


BUN     (7.0-18.0)  mg/dL


 


Creatinine     (0.6-1.0)  mg/dL


 


Est Cr Clr Drug Dosing     mL/min


 


Estimated GFR (MDRD)     ml/min


 


Glucose     ()  mg/dL


 


Hemoglobin A1c     (4.5-6.2)  %


 


Calcium     (8.5-10.1)  mg/dL


 


Total Bilirubin     (0.2-1.0)  mg/dL


 


AST     (15-37)  IU/L


 


ALT     (14-63)  IU/L


 


Alkaline Phosphatase     ()  U/L


 


C-Reactive Protein    10.80 H  (0.00-0.90)  mg/dL


 


Total Protein     (6.4-8.2)  g/dL


 


Albumin     (3.4-5.0)  g/dL


 


Globulin     (2.6-4.0)  g/dL


 


Albumin/Globulin Ratio     (0.9-1.6)  


 


Urine Color     


 


Urine Appearance     


 


Urine pH     (5.0-8.0)  


 


Ur Specific Gravity     (1.001-1.035)  


 


Urine Protein     (NEGATIVE)  mg/dL


 


Urine Glucose (UA)     (NEGATIVE)  mg/dL


 


Urine Ketones     (NEGATIVE)  mg/dL


 


Urine Occult Blood     (NEGATIVE)  


 


Urine Nitrite     (NEGATIVE)  


 


Urine Bilirubin     (NEGATIVE)  


 


Urine Urobilinogen     (<2.0)  EU/dL


 


Ur Leukocyte Esterase     (NEGATIVE)  


 


Urine RBC     (0-2/HPF)  


 


Urine WBC     (0-5/HPF)  


 


Ur Epithelial Cells     (NONE-FEW)  


 


Urine Bacteria     (NEGATIVE)  














  11/01/19 11/02/19 11/02/19 Range/Units





  22:15 06:20 06:20 


 


WBC   14.92 H   (4.0-11.0)  K/uL


 


RBC   3.75 L   (4.30-5.90)  M/uL


 


Hgb   11.1 L   (12.0-16.0)  g/dL


 


Hct   33.9 L   (36.0-46.0)  %


 


MCV   90.4   (80.0-98.0)  fL


 


MCH   29.6   (27.0-32.0)  pg


 


MCHC   32.7   (31.0-37.0)  g/dL


 


RDW Std Deviation   43.4   (28.0-62.0)  fl


 


RDW Coeff of Rai   13   (11.0-15.0)  %


 


Plt Count   367   (150-400)  K/uL


 


MPV   9.10   (7.40-12.00)  fL


 


Neut % (Auto)   80.6 H   (48.0-80.0)  %


 


Lymph % (Auto)   9.9 L   (16.0-40.0)  %


 


Mono % (Auto)   6.9   (0.0-15.0)  %


 


Eos % (Auto)   2.4   (0.0-7.0)  %


 


Baso % (Auto)   0.2   (0.0-1.5)  %


 


Neut # (Auto)   12.0 H   (1.4-5.7)  K/uL


 


Lymph # (Auto)   1.5   (0.6-2.4)  K/uL


 


Mono # (Auto)   1.0 H   (0.0-0.8)  K/uL


 


Eos # (Auto)   0.4   (0.0-0.7)  K/uL


 


Baso # (Auto)   0.0   (0.0-0.1)  K/uL


 


Nucleated RBC %   0.0   /100WBC


 


Nucleated RBCs #   0   K/uL


 


ESR   26   (0-29)  mm/hr


 


Lactate     (0.20-2.00)  mmol/L


 


Sodium    139  (136-145)  mmol/L


 


Potassium    4.0  (3.5-5.1)  mmol/L


 


Chloride    105  ()  mmol/L


 


Carbon Dioxide    22.6  (21.0-32.0)  mmol/L


 


BUN    12  (7.0-18.0)  mg/dL


 


Creatinine    0.8  (0.6-1.0)  mg/dL


 


Est Cr Clr Drug Dosing    40.21  mL/min


 


Estimated GFR (MDRD)    > 60.0  ml/min


 


Glucose    78  ()  mg/dL


 


Hemoglobin A1c     (4.5-6.2)  %


 


Calcium    8.1 L  (8.5-10.1)  mg/dL


 


Total Bilirubin    0.3  (0.2-1.0)  mg/dL


 


AST    16  (15-37)  IU/L


 


ALT    13 L  (14-63)  IU/L


 


Alkaline Phosphatase    134 H  ()  U/L


 


C-Reactive Protein    9.60 H  (0.00-0.90)  mg/dL


 


Total Protein    5.6 L  (6.4-8.2)  g/dL


 


Albumin    2.3 L  (3.4-5.0)  g/dL


 


Globulin    3.3  (2.6-4.0)  g/dL


 


Albumin/Globulin Ratio    0.7 L  (0.9-1.6)  


 


Urine Color  YELLOW    


 


Urine Appearance  CLOUDY    


 


Urine pH  6.0    (5.0-8.0)  


 


Ur Specific Gravity  <= 1.005    (1.001-1.035)  


 


Urine Protein  NEGATIVE    (NEGATIVE)  mg/dL


 


Urine Glucose (UA)  NEGATIVE    (NEGATIVE)  mg/dL


 


Urine Ketones  NEGATIVE    (NEGATIVE)  mg/dL


 


Urine Occult Blood  TRACE-INTACT H    (NEGATIVE)  


 


Urine Nitrite  POSITIVE H    (NEGATIVE)  


 


Urine Bilirubin  NEGATIVE    (NEGATIVE)  


 


Urine Urobilinogen  0.2    (<2.0)  EU/dL


 


Ur Leukocyte Esterase  MODERATE H    (NEGATIVE)  


 


Urine RBC  0-3    (0-2/HPF)  


 


Urine WBC  42-51    (0-5/HPF)  


 


Ur Epithelial Cells  RARE    (NONE-FEW)  


 


Urine Bacteria  1+ H    (NEGATIVE)  











Result Diagrams: 


 11/02/19 06:20





 11/02/19 06:20


Jose J Results Last 24 hrs: 


 Microbiology











 11/01/19 16:16 Anaerobic Blood Culture - Final





 Blood - Venous - Lab Draw 


 


 11/01/19 16:05 Anaerobic Blood Culture - Final





 Blood - Venous 














Consult PN Assessment/Plan


Procedures: 


Procedures





ACUTE HEPATITIS PANEL (09/01/17)


ASSAY OF AMYLASE (09/01/17)


ASSAY OF IRON (09/01/17)


ASSAY OF LACTIC ACID (09/01/17)


ASSAY OF LIPASE (09/01/17)


BLOOD CULTURE FOR BACTERIA (09/01/17)


BLOOD TYPING SEROLOGIC ABO (12/29/14)


BLOOD TYPING SEROLOGIC RH(D) (12/29/14)


BODY FLUID CELL COUNT (01/26/18)


C-REACTIVE PROTEIN (09/01/17)


CHEST X-RAY 1 VIEW FRONTAL (09/01/17)


COMPATIBILITY TEST ANTIGLOB (12/29/14)


COMPATIBILITY TEST INCUBATE (12/29/14)


COMPATIBILITY TEST SPIN (12/29/14)


COMPLETE CBC AUTOMATED (04/29/16)


COMPLETE CBC W/AUTO DIFF WBC (09/01/17)


COMPREHEN METABOLIC PANEL (09/01/17)


CT LOWER EXTREMITY W/DYE (03/29/17)


CT LOWER EXTREMITY W/O DYE (07/20/17)


CULTR BACTERIA EXCEPT BLOOD (08/27/16)


CULTURE OTHR SPECIMN AEROBIC (01/26/18)


CYTOPATH FL NONGYN SMEARS (01/26/18)


MAGGIE MUSC/FASCIA 20 SQ CM/< (07/29/16)


MAGGIE SUBQ TISSUE 20 SQ CM/< (04/29/16)


DECALCIFY TISSUE (12/29/14)


ECHO EXAM OF ABDOMEN (09/01/17)


ECHO GUIDE FOR BIOPSY (01/26/18)


ELECTROCARDIOGRAM TRACING (04/29/16)


EMERGENCY DEPT VISIT (09/01/17)


EMERGENCY DEPT VISIT (07/17/17)


EXAM SYNOVIAL FLUID CRYSTALS (01/26/18)


EXTREMITY STUDY (08/31/16)


FLUOROSCOPY <1 HR PHYS/QHP (11/21/14)


FUNGUS ISOLATION CULTURE (01/26/18)


GAIT TRAINING THERAPY (03/27/15)


HEMATOCRIT (12/29/14)


HEMOGLOBIN (12/29/14)


HOT OR COLD PACKS THERAPY (03/27/15)


HYDRATE IV INFUSION ADD-ON (09/01/17)


INSJ PICC 5 YR+ W/O IMAGING (08/28/17)


LEG SURGERY PROCEDURE (12/29/14)


METABOLIC PANEL TOTAL CA (08/28/17)


MRI JNT OF LWR EXTRE W/O DYE (11/22/14)


OFFICE/OUTPATIENT VISIT EST (08/29/16)


OFFICE/OUTPATIENT VISIT EST (03/31/15)


PROTHROMBIN TIME (09/01/17)


PT EVALUATION (01/30/15)


PUNCTURE DRAINAGE OF LESION (01/26/18)


RBC ANTIBODY SCREEN (12/29/14)


RBC SED RATE AUTOMATED (09/01/17)


REMOVAL OF SUPPORT IMPLANT (11/21/14)


ROUTINE VENIPUNCTURE (09/01/17)


SMEAR GRAM STAIN (01/26/18)


THER/PROPH/DIAG INJ IV PUSH (09/01/17)


THER/PROPH/DIAG INJ SC/IM (08/27/16)


THER/PROPH/DIAG IV INF INIT (07/17/17)


THERAPEUTIC EXERCISES (03/27/15)


TISSUE EXAM BY PATHOLOGIST (12/29/14)


TOTAL KNEE ARTHROPLASTY (12/29/14)


TX/PRO/DX INJ NEW DRUG ADDON (09/01/17)


TX/PRO/DX INJ SAME DRUG ADON (09/01/17)


URINALYSIS AUTO W/SCOPE (12/29/14)


URINE BACTERIA CULTURE (12/29/14)


US GUIDE VASCULAR ACCESS (08/28/17)


VASOPNEUMATIC DEVICE THERAPY (02/26/15)


X-RAY EXAM KNEE 4 OR MORE (07/28/16)


X-RAY EXAM OF ANKLE (11/20/14)


X-RAY EXAM OF FOOT (04/07/14)


X-RAY EXAM OF KNEE 1 OR 2 (09/01/17)


X-RAY EXAM OF KNEE 3 (08/28/17)


X-RAY EXAM OF LOWER LEG (08/27/16)








(1) Decubitus ulcer of heel, left, unstageable


SNOMED Code(s): 426033173


   Code(s): L89.620 - PRESSURE ULCER OF LEFT HEEL, UNSTAGEABLE   Priority: High

   Current Visit: Yes   





(2) Decubitus ulcer of left foot, unstageable


SNOMED Code(s): 61631321, 832595795, [id number]


   Code(s): L89.890 - PRESSURE ULCER OF OTHER SITE, UNSTAGEABLE   Priority: 

High   Current Visit: Yes   





(3) Cellulitis


SNOMED Code(s): 163029778


   Code(s): L03.90 - CELLULITIS, UNSPECIFIED   Priority: Medium   Current Visit

: Yes   


Qualifiers: 


   Site of cellulitis: other site   Qualified Code(s): L03.818 - Cellulitis of 

other sites   


Problem List Initiated/Reviewed/Updated: Yes


Plan: 





Triple phase bone scan.


Ankle brachial indices bilaterally.


CT or MRI of left foot to look for deep infection/osteomyelitis.


Podiatry consult.

## 2019-11-02 NOTE — PCM.PN
- General Info


Date of Service: 11/02/19


Subjective Update: 





no acute events overnight. doing well. States she has some moderate left foot 

pain. Denies any chest pain, dyspnea, abdominal pain.





- Patient Data


Vitals - Most Recent: 


 Last Vital Signs











Temp  37.3 C   11/02/19 07:31


 


Pulse  100   11/02/19 07:31


 


Resp  18   11/02/19 07:31


 


BP  140/70   11/02/19 08:44


 


Pulse Ox  94 L  11/02/19 07:31











Weight - Most Recent: 48.988 kg


I&O - Last 24 Hours: 


 Intake & Output











 11/01/19 11/02/19 11/02/19





 22:59 06:59 14:59


 


Intake Total  500 


 


Output Total  500 


 


Balance  0 











Lab Results Last 24 Hours: 


 Laboratory Results - last 24 hr











  11/01/19 11/01/19 11/01/19 Range/Units





  16:05 16:05 16:05 


 


WBC  19.73 H    (4.0-11.0)  K/uL


 


RBC  4.07 L    (4.30-5.90)  M/uL


 


Hgb  12.2    (12.0-16.0)  g/dL


 


Hct  36.6    (36.0-46.0)  %


 


MCV  89.9    (80.0-98.0)  fL


 


MCH  30.0    (27.0-32.0)  pg


 


MCHC  33.3    (31.0-37.0)  g/dL


 


RDW Std Deviation  43.3    (28.0-62.0)  fl


 


RDW Coeff of Rai  13    (11.0-15.0)  %


 


Plt Count  409 H    (150-400)  K/uL


 


MPV  9.30    (7.40-12.00)  fL


 


Neut % (Auto)  81.8 H    (48.0-80.0)  %


 


Lymph % (Auto)  11.0 L    (16.0-40.0)  %


 


Mono % (Auto)  5.1    (0.0-15.0)  %


 


Eos % (Auto)  1.9    (0.0-7.0)  %


 


Baso % (Auto)  0.2    (0.0-1.5)  %


 


Neut # (Auto)  16.2 H    (1.4-5.7)  K/uL


 


Lymph # (Auto)  2.2    (0.6-2.4)  K/uL


 


Mono # (Auto)  1.0 H    (0.0-0.8)  K/uL


 


Eos # (Auto)  0.4    (0.0-0.7)  K/uL


 


Baso # (Auto)  0.0    (0.0-0.1)  K/uL


 


Nucleated RBC %  0.0    /100WBC


 


Nucleated RBCs #  0    K/uL


 


ESR     (0-29)  mm/hr


 


Lactate     (0.20-2.00)  mmol/L


 


Sodium   128 L   (136-145)  mmol/L


 


Potassium   4.6   (3.5-5.1)  mmol/L


 


Chloride   96 L   ()  mmol/L


 


Carbon Dioxide   16.7 L   (21.0-32.0)  mmol/L


 


BUN   16   (7.0-18.0)  mg/dL


 


Creatinine   0.8   (0.6-1.0)  mg/dL


 


Est Cr Clr Drug Dosing   40.21   mL/min


 


Estimated GFR (MDRD)   > 60.0   ml/min


 


Glucose   82   ()  mg/dL


 


Hemoglobin A1c    5.5  (4.5-6.2)  %


 


Calcium   8.8   (8.5-10.1)  mg/dL


 


Total Bilirubin   0.6   (0.2-1.0)  mg/dL


 


AST   27   (15-37)  IU/L


 


ALT   15   (14-63)  IU/L


 


Alkaline Phosphatase   158 H   ()  U/L


 


C-Reactive Protein     (0.00-0.90)  mg/dL


 


Total Protein   6.8   (6.4-8.2)  g/dL


 


Albumin   2.8 L   (3.4-5.0)  g/dL


 


Globulin   4.0   (2.6-4.0)  g/dL


 


Albumin/Globulin Ratio   0.7 L   (0.9-1.6)  


 


Urine Color     


 


Urine Appearance     


 


Urine pH     (5.0-8.0)  


 


Ur Specific Gravity     (1.001-1.035)  


 


Urine Protein     (NEGATIVE)  mg/dL


 


Urine Glucose (UA)     (NEGATIVE)  mg/dL


 


Urine Ketones     (NEGATIVE)  mg/dL


 


Urine Occult Blood     (NEGATIVE)  


 


Urine Nitrite     (NEGATIVE)  


 


Urine Bilirubin     (NEGATIVE)  


 


Urine Urobilinogen     (<2.0)  EU/dL


 


Ur Leukocyte Esterase     (NEGATIVE)  


 


Urine RBC     (0-2/HPF)  


 


Urine WBC     (0-5/HPF)  


 


Ur Epithelial Cells     (NONE-FEW)  


 


Urine Bacteria     (NEGATIVE)  














  11/01/19 11/01/19 11/01/19 Range/Units





  16:16 16:16 16:16 


 


WBC     (4.0-11.0)  K/uL


 


RBC     (4.30-5.90)  M/uL


 


Hgb     (12.0-16.0)  g/dL


 


Hct     (36.0-46.0)  %


 


MCV     (80.0-98.0)  fL


 


MCH     (27.0-32.0)  pg


 


MCHC     (31.0-37.0)  g/dL


 


RDW Std Deviation     (28.0-62.0)  fl


 


RDW Coeff of Rai     (11.0-15.0)  %


 


Plt Count     (150-400)  K/uL


 


MPV     (7.40-12.00)  fL


 


Neut % (Auto)     (48.0-80.0)  %


 


Lymph % (Auto)     (16.0-40.0)  %


 


Mono % (Auto)     (0.0-15.0)  %


 


Eos % (Auto)     (0.0-7.0)  %


 


Baso % (Auto)     (0.0-1.5)  %


 


Neut # (Auto)     (1.4-5.7)  K/uL


 


Lymph # (Auto)     (0.6-2.4)  K/uL


 


Mono # (Auto)     (0.0-0.8)  K/uL


 


Eos # (Auto)     (0.0-0.7)  K/uL


 


Baso # (Auto)     (0.0-0.1)  K/uL


 


Nucleated RBC %     /100WBC


 


Nucleated RBCs #     K/uL


 


ESR   44 H   (0-29)  mm/hr


 


Lactate  0.9    (0.20-2.00)  mmol/L


 


Sodium     (136-145)  mmol/L


 


Potassium     (3.5-5.1)  mmol/L


 


Chloride     ()  mmol/L


 


Carbon Dioxide     (21.0-32.0)  mmol/L


 


BUN     (7.0-18.0)  mg/dL


 


Creatinine     (0.6-1.0)  mg/dL


 


Est Cr Clr Drug Dosing     mL/min


 


Estimated GFR (MDRD)     ml/min


 


Glucose     ()  mg/dL


 


Hemoglobin A1c     (4.5-6.2)  %


 


Calcium     (8.5-10.1)  mg/dL


 


Total Bilirubin     (0.2-1.0)  mg/dL


 


AST     (15-37)  IU/L


 


ALT     (14-63)  IU/L


 


Alkaline Phosphatase     ()  U/L


 


C-Reactive Protein    10.80 H  (0.00-0.90)  mg/dL


 


Total Protein     (6.4-8.2)  g/dL


 


Albumin     (3.4-5.0)  g/dL


 


Globulin     (2.6-4.0)  g/dL


 


Albumin/Globulin Ratio     (0.9-1.6)  


 


Urine Color     


 


Urine Appearance     


 


Urine pH     (5.0-8.0)  


 


Ur Specific Gravity     (1.001-1.035)  


 


Urine Protein     (NEGATIVE)  mg/dL


 


Urine Glucose (UA)     (NEGATIVE)  mg/dL


 


Urine Ketones     (NEGATIVE)  mg/dL


 


Urine Occult Blood     (NEGATIVE)  


 


Urine Nitrite     (NEGATIVE)  


 


Urine Bilirubin     (NEGATIVE)  


 


Urine Urobilinogen     (<2.0)  EU/dL


 


Ur Leukocyte Esterase     (NEGATIVE)  


 


Urine RBC     (0-2/HPF)  


 


Urine WBC     (0-5/HPF)  


 


Ur Epithelial Cells     (NONE-FEW)  


 


Urine Bacteria     (NEGATIVE)  














  11/01/19 11/02/19 11/02/19 Range/Units





  22:15 06:20 06:20 


 


WBC   14.92 H   (4.0-11.0)  K/uL


 


RBC   3.75 L   (4.30-5.90)  M/uL


 


Hgb   11.1 L   (12.0-16.0)  g/dL


 


Hct   33.9 L   (36.0-46.0)  %


 


MCV   90.4   (80.0-98.0)  fL


 


MCH   29.6   (27.0-32.0)  pg


 


MCHC   32.7   (31.0-37.0)  g/dL


 


RDW Std Deviation   43.4   (28.0-62.0)  fl


 


RDW Coeff of Rai   13   (11.0-15.0)  %


 


Plt Count   367   (150-400)  K/uL


 


MPV   9.10   (7.40-12.00)  fL


 


Neut % (Auto)   80.6 H   (48.0-80.0)  %


 


Lymph % (Auto)   9.9 L   (16.0-40.0)  %


 


Mono % (Auto)   6.9   (0.0-15.0)  %


 


Eos % (Auto)   2.4   (0.0-7.0)  %


 


Baso % (Auto)   0.2   (0.0-1.5)  %


 


Neut # (Auto)   12.0 H   (1.4-5.7)  K/uL


 


Lymph # (Auto)   1.5   (0.6-2.4)  K/uL


 


Mono # (Auto)   1.0 H   (0.0-0.8)  K/uL


 


Eos # (Auto)   0.4   (0.0-0.7)  K/uL


 


Baso # (Auto)   0.0   (0.0-0.1)  K/uL


 


Nucleated RBC %   0.0   /100WBC


 


Nucleated RBCs #   0   K/uL


 


ESR   26   (0-29)  mm/hr


 


Lactate     (0.20-2.00)  mmol/L


 


Sodium    139  (136-145)  mmol/L


 


Potassium    4.0  (3.5-5.1)  mmol/L


 


Chloride    105  ()  mmol/L


 


Carbon Dioxide    22.6  (21.0-32.0)  mmol/L


 


BUN    12  (7.0-18.0)  mg/dL


 


Creatinine    0.8  (0.6-1.0)  mg/dL


 


Est Cr Clr Drug Dosing    40.21  mL/min


 


Estimated GFR (MDRD)    > 60.0  ml/min


 


Glucose    78  ()  mg/dL


 


Hemoglobin A1c     (4.5-6.2)  %


 


Calcium    8.1 L  (8.5-10.1)  mg/dL


 


Total Bilirubin    0.3  (0.2-1.0)  mg/dL


 


AST    16  (15-37)  IU/L


 


ALT    13 L  (14-63)  IU/L


 


Alkaline Phosphatase    134 H  ()  U/L


 


C-Reactive Protein    9.60 H  (0.00-0.90)  mg/dL


 


Total Protein    5.6 L  (6.4-8.2)  g/dL


 


Albumin    2.3 L  (3.4-5.0)  g/dL


 


Globulin    3.3  (2.6-4.0)  g/dL


 


Albumin/Globulin Ratio    0.7 L  (0.9-1.6)  


 


Urine Color  YELLOW    


 


Urine Appearance  CLOUDY    


 


Urine pH  6.0    (5.0-8.0)  


 


Ur Specific Gravity  <= 1.005    (1.001-1.035)  


 


Urine Protein  NEGATIVE    (NEGATIVE)  mg/dL


 


Urine Glucose (UA)  NEGATIVE    (NEGATIVE)  mg/dL


 


Urine Ketones  NEGATIVE    (NEGATIVE)  mg/dL


 


Urine Occult Blood  TRACE-INTACT H    (NEGATIVE)  


 


Urine Nitrite  POSITIVE H    (NEGATIVE)  


 


Urine Bilirubin  NEGATIVE    (NEGATIVE)  


 


Urine Urobilinogen  0.2    (<2.0)  EU/dL


 


Ur Leukocyte Esterase  MODERATE H    (NEGATIVE)  


 


Urine RBC  0-3    (0-2/HPF)  


 


Urine WBC  42-51    (0-5/HPF)  


 


Ur Epithelial Cells  RARE    (NONE-FEW)  


 


Urine Bacteria  1+ H    (NEGATIVE)  











Jose J Results Last 24 Hours: 


 Microbiology











 11/01/19 16:16 Anaerobic Blood Culture - Final





 Blood - Venous - Lab Draw 


 


 11/01/19 16:05 Anaerobic Blood Culture - Final





 Blood - Venous 











Med Orders - Current: 


 Current Medications





Acetaminophen (Tylenol)  650 mg PO Q4H PRN


   PRN Reason: Pain (Mild 1-3)/fever


   Last Admin: 11/02/19 04:09 Dose:  650 mg


Amlodipine Besylate (Norvasc)  10 mg PO DAILY [facility]


   Last Admin: 11/02/19 08:44 Dose:  Not Given


Enoxaparin Sodium (Lovenox)  40 mg SUBCUT Q24H [facility]


   Last Admin: 11/01/19 19:33 Dose:  40 mg


Sodium Chloride (Normal Saline)  1,000 mls @ 75 mls/hr IV ASDIRECTED [facility]


   Last Admin: 11/02/19 03:58 Dose:  125 mls/hr


Vancomycin HCl 0.75 gm/ Sodium (Chloride)  250 mls @ 166.667 mls/hr IV Q24H [facility]


Meropenem/Sodium Chloride 1 gm (/ Premix)  50 mls @ 100 mls/hr IV Q8H [facility]


Ketorolac Tromethamine (Toradol)  15 mg IVPUSH Q6H PRN


   PRN Reason: Pain


   Last Admin: 11/02/19 08:45 Dose:  15 mg


Levothyroxine Sodium (Synthroid)  88 mcg PO ACBREAKFAST [facility]


   Last Admin: 11/02/19 06:32 Dose:  88 mcg


Promethazine HCl (Phenergan)  25 mg PO Q6H PRN


   PRN Reason: Nausea/Vomiting


   Last Admin: 11/02/19 07:00 Dose:  25 mg


Sodium Chloride (Saline Flush)  10 ml FLUSH ASDIRECTED PRN


   PRN Reason: Keep Vein Open


   Last Admin: 11/01/19 17:26 Dose:  10 ml


Sodium Chloride (Saline Flush)  2.5 ml FLUSH ASDIRECTED PRN


   PRN Reason: Keep Vein Open


   Last Admin: 11/01/19 17:25 Dose:  2.5 ml


Tramadol HCl (Ultram)  100 mg PO Q8H PRN


   PRN Reason: Pain


   Last Admin: 11/02/19 07:00 Dose:  100 mg


Vancomycin HCl (Pharmacy To Dose - Vancomycin)  1 dose .XX ASDIRECTED [facility]





Discontinued Medications





Heparin Sodium (Porcine) (Heparin Sodium)  5,000 units SUBCUT Q8H [facility]


   Last Admin: 11/01/19 19:58 Dose:  Not Given


Hydromorphone HCl (Dilaudid)  0.5 mg IVPUSH ONETIME ONE


   Stop: 11/01/19 17:23


   Last Admin: 11/01/19 17:28 Dose:  0.5 mg


Vancomycin HCl 1 gm/ Sodium (Chloride)  250 mls @ 166 mls/hr IV ONETIME ONE


   Stop: 11/01/19 18:13


   Last Admin: 11/01/19 17:25 Dose:  166 mls/hr


Meropenem 1 gm/ Sodium (Chloride)  100 mls @ 200 mls/hr IV Q8H [facility]


   Last Admin: 11/01/19 19:59 Dose:  Not Given


Meropenem 1 gm/ Sodium (Chloride)  100 mls @ 200 mls/hr IV Q8H [facility]


   Last Admin: 11/02/19 04:03 Dose:  200 mls/hr


Meropenem/Sodium Chloride 1 gm (/ Premix)  50 mls @ 100 mls/hr IV Q8H [facility]


Ketorolac Tromethamine (Toradol)  15 mg IVPUSH ONETIME ONE


   Stop: 11/01/19 17:21


   Last Admin: 11/01/19 17:28 Dose:  15 mg


Ondansetron HCl (Zofran)  4 mg IVPUSH ONETIME ONE


   Stop: 11/01/19 17:23


   Last Admin: 11/01/19 17:28 Dose:  4 mg


Ondansetron HCl (Zofran)  4 mg IVPUSH ONETIME ONE


   Stop: 11/01/19 17:25


   Last Admin: 11/01/19 17:35 Dose:  Not Given











- Exam


General: Alert, Oriented, Cooperative, No Acute Distress


Lungs: Clear to Auscultation, Normal Respiratory Effort.  No: Rhonchi, Wheezing


Cardiovascular: Regular Rate, Regular Rhythm


GI/Abdominal Exam: Normal Bowel Sounds, Soft, Non-Tender


Extremities: Other (left foot- several decubitus ulcers on plantar foot area.)


Psy/Mental Status: Alert





- Problem List Review


Problem List Initiated/Reviewed/Updated: Yes





- My Orders


Last 24 Hours: 


My Active Orders





11/01/19 17:20


Bedrest Bathroom Privileges [RC] ASDIRECTED 


Intake and Output [RC] QSHIFT 


Oxygen Therapy [RC] PRN 


VTE/DVT Education [RC] PER UNIT ROUTINE 


Vital Signs [RC] Q4H 


Acetaminophen [Tylenol]   650 mg PO Q4H PRN 


Resuscitation Status Routine 





11/01/19 17:27


Ketorolac [Toradol]   15 mg IVPUSH Q6H PRN 





11/01/19 17:30


Pharmacy to Dose - Vancomycin   1 dose .XX ASDIRECTED 





11/01/19 17:33


Consult to Wound Care Services [CONS] Stat 


CULTURE WOUND [RM] Stat 





11/01/19 19:08


Promethazine [Phenergan]   25 mg PO Q6H PRN 





11/01/19 19:15


Enoxaparin [Lovenox]   40 mg SUBCUT Q24H 





11/01/19 22:15


CULTURE URINE [RM] Routine 





11/01/19 Dinner


Regular Diet [DIET] 





11/02/19 07:30


Levothyroxine [Synthroid]   88 mcg PO ACBREAKFAST 





11/02/19 09:00


amLODIPine [Norvasc]   10 mg PO DAILY 





11/02/19 09:30


CV Ankle Brachial Index (DAO) [US] Routine 





11/02/19 10:00


Dressing Change [Wound Care] [RC] DAILY 





11/02/19 12:00


Meropenem Premix [Meropenem] 1 gm   Premix Bag 1 bag IV Q8H 





11/03/19 05:11


CBC WITH AUTO DIFF [HEME] AM 


COMPREHENSIVE METABOLIC PN,CMP [CHEM] AM 


CRP [C-REACTIVE PROTEIN] [CHEM] AM 


SEDIMENTATION RATE AUTO [HEME] AM 





11/04/19 05:11


CBC WITH AUTO DIFF [HEME] AM 


COMPREHENSIVE METABOLIC PN,CMP [CHEM] AM 


CRP [C-REACTIVE PROTEIN] [CHEM] AM 


SEDIMENTATION RATE AUTO [HEME] AM 














- Plan


Plan:: 





A:


1. Suspected osteomyelitis


2. Urinary tract infection


3. Decubitus ulcers


4. Leukocytosis


5. PMH Raynaud's syndrome, hypothyroidism





P:


1. suspected osteomyelitis- Consulted with Dr. Jeter, General Surgery. 

Appreciate recs. Will continue with IV antibiotics: Meropenem and vancomycin. 

Will order MRI of left foot. Daily wound dressing changes. 


2. UTI-pending urine culture. She is covered with current antibiotics.


3. Raynaud's syndrome- will continue home medication.





dispo: pending imaging

## 2019-11-03 LAB
BUN SERPL-MCNC: 10 MG/DL (ref 7–18)
CHLORIDE SERPL-SCNC: 106 MMOL/L (ref 98–107)
CO2 SERPL-SCNC: 23.5 MMOL/L (ref 21–32)
GLUCOSE SERPL-MCNC: 81 MG/DL (ref 74–106)
POTASSIUM SERPL-SCNC: 3.8 MMOL/L (ref 3.5–5.1)
SODIUM SERPL-SCNC: 140 MMOL/L (ref 136–145)

## 2019-11-03 RX ADMIN — MEROPENEM AND SODIUM CHLORIDE SCH MLS/HR: 1 INJECTION, SOLUTION INTRAVENOUS at 19:20

## 2019-11-03 RX ADMIN — OMEPRAZOLE SCH MG: 20 CAPSULE, DELAYED RELEASE ORAL at 20:42

## 2019-11-03 RX ADMIN — MEROPENEM AND SODIUM CHLORIDE SCH MLS/HR: 1 INJECTION, SOLUTION INTRAVENOUS at 11:07

## 2019-11-03 RX ADMIN — KETOROLAC TROMETHAMINE PRN MG: 30 INJECTION, SOLUTION INTRAMUSCULAR at 22:59

## 2019-11-03 RX ADMIN — MEROPENEM AND SODIUM CHLORIDE SCH MLS/HR: 1 INJECTION, SOLUTION INTRAVENOUS at 04:10

## 2019-11-03 RX ADMIN — KETOROLAC TROMETHAMINE PRN MG: 30 INJECTION, SOLUTION INTRAMUSCULAR at 08:45

## 2019-11-03 NOTE — PCM.PN
- General Info


Date of Service: 11/03/19





- Review of Systems


Systems Review Comment:: 


reports foot pain, denies any fevers, erythema improving





- Patient Data


Vitals - Most Recent: 


 Last Vital Signs











Temp  36.8 C   11/03/19 07:58


 


Pulse  95   11/03/19 07:58


 


Resp  18   11/03/19 07:58


 


BP  125/62   11/03/19 08:45


 


Pulse Ox  96   11/03/19 07:58











Weight - Most Recent: 48.988 kg


I&O - Last 24 Hours: 


 Intake & Output











 11/02/19 11/03/19 11/03/19





 23:59 06:59 14:59


 


Intake Total   


 


Output Total   


 


Balance   











Lab Results Last 24 Hours: 


 Laboratory Results - last 24 hr











  11/03/19 11/03/19 Range/Units





  05:40 05:40 


 


WBC  14.11 H   (4.0-11.0)  K/uL


 


RBC  3.83 L   (4.30-5.90)  M/uL


 


Hgb  11.3 L   (12.0-16.0)  g/dL


 


Hct  34.4 L   (36.0-46.0)  %


 


MCV  89.8   (80.0-98.0)  fL


 


MCH  29.5   (27.0-32.0)  pg


 


MCHC  32.8   (31.0-37.0)  g/dL


 


RDW Std Deviation  43.3   (28.0-62.0)  fl


 


RDW Coeff of Rai  13   (11.0-15.0)  %


 


Plt Count  338   (150-400)  K/uL


 


MPV  9.10   (7.40-12.00)  fL


 


Neut % (Auto)  76.2   (48.0-80.0)  %


 


Lymph % (Auto)  10.1 L   (16.0-40.0)  %


 


Mono % (Auto)  5.9   (0.0-15.0)  %


 


Eos % (Auto)  7.4 H   (0.0-7.0)  %


 


Baso % (Auto)  0.4   (0.0-1.5)  %


 


Neut # (Auto)  10.8 H   (1.4-5.7)  K/uL


 


Lymph # (Auto)  1.4   (0.6-2.4)  K/uL


 


Mono # (Auto)  0.8   (0.0-0.8)  K/uL


 


Eos # (Auto)  1.0 H   (0.0-0.7)  K/uL


 


Baso # (Auto)  0.1   (0.0-0.1)  K/uL


 


Nucleated RBC %  0.0   /100WBC


 


Nucleated RBCs #  0   K/uL


 


ESR  29   (0-29)  mm/hr


 


Sodium   140  (136-145)  mmol/L


 


Potassium   3.8  (3.5-5.1)  mmol/L


 


Chloride   106  ()  mmol/L


 


Carbon Dioxide   23.5  (21.0-32.0)  mmol/L


 


BUN   10  (7.0-18.0)  mg/dL


 


Creatinine   0.8  (0.6-1.0)  mg/dL


 


Est Cr Clr Drug Dosing   40.21  mL/min


 


Estimated GFR (MDRD)   > 60.0  ml/min


 


Glucose   81  ()  mg/dL


 


Calcium   7.9 L  (8.5-10.1)  mg/dL


 


Total Bilirubin   0.3  (0.2-1.0)  mg/dL


 


AST   15  (15-37)  IU/L


 


ALT   15  (14-63)  IU/L


 


Alkaline Phosphatase   130 H  ()  U/L


 


C-Reactive Protein   6.80 H  (0.00-0.90)  mg/dL


 


Total Protein   5.7 L  (6.4-8.2)  g/dL


 


Albumin   2.2 L  (3.4-5.0)  g/dL


 


Globulin   3.5  (2.6-4.0)  g/dL


 


Albumin/Globulin Ratio   0.6 L  (0.9-1.6)  











Jose J Results Last 24 Hours: 


 Microbiology











 11/01/19 16:16 Aerobic Blood Culture - Preliminary





 Blood - Venous - Lab Draw    NO GROWTH AFTER 1 DAY





 Anaerobic Blood Culture - Final


 


 11/01/19 16:05 Aerobic Blood Culture - Preliminary





 Blood - Venous    NO GROWTH AFTER 1 DAY





 Anaerobic Blood Culture - Final











Med Orders - Current: 


 Current Medications





Acetaminophen (Tylenol)  650 mg PO Q4H PRN


   PRN Reason: Pain (Mild 1-3)/fever


   Last Admin: 11/02/19 22:20 Dose:  650 mg


Amlodipine Besylate (Norvasc)  2.5 mg PO DAILY [facility]


   Last Admin: 11/03/19 08:45 Dose:  2.5 mg


Enoxaparin Sodium (Lovenox)  40 mg SUBCUT Q24H [facility]


   Last Admin: 11/02/19 19:10 Dose:  40 mg


Hydromorphone HCl (Dilaudid)  0.5 mg IVPUSH Q3H PRN


   PRN Reason: Pain


   Last Admin: 11/03/19 07:11 Dose:  0.5 mg


Sodium Chloride (Normal Saline)  1,000 mls @ 75 mls/hr IV ASDIRECTED [facility]


   Last Admin: 11/03/19 04:08 Dose:  75 mls/hr


Vancomycin HCl 0.75 gm/ Sodium (Chloride)  250 mls @ 166.667 mls/hr IV Q24H [facility]


   Last Admin: 11/02/19 16:33 Dose:  166.667 mls/hr


Meropenem/Sodium Chloride 1 gm (/ Premix)  50 mls @ 100 mls/hr IV Q8H [facility]


   Last Admin: 11/03/19 04:10 Dose:  100 mls/hr


Ketorolac Tromethamine (Toradol)  15 mg IVPUSH Q6H PRN


   PRN Reason: Pain


   Last Admin: 11/03/19 08:45 Dose:  15 mg


Levothyroxine Sodium (Synthroid)  88 mcg PO ACBREAKFAST [facility]


   Last Admin: 11/03/19 07:11 Dose:  88 mcg


Omeprazole (Omeprazole)  20 mg PO BEDTIME [facility]


Promethazine HCl (Phenergan)  12.5 - 25 mg PO Q6H PRN


   PRN Reason: Nausea/Vomiting


Sodium Chloride (Saline Flush)  10 ml FLUSH ASDIRECTED PRN


   PRN Reason: Keep Vein Open


   Last Admin: 11/01/19 17:26 Dose:  10 ml


Sodium Chloride (Saline Flush)  2.5 ml FLUSH ASDIRECTED PRN


   PRN Reason: Keep Vein Open


   Last Admin: 11/01/19 17:25 Dose:  2.5 ml


Tramadol HCl (Ultram)  100 mg PO Q8H PRN


   PRN Reason: Pain


   Last Admin: 11/03/19 04:17 Dose:  100 mg


Vancomycin HCl (Pharmacy To Dose - Vancomycin)  1 dose .XX ASDIRECTED [facility]





Discontinued Medications





Amlodipine Besylate (Norvasc)  10 mg PO DAILY [facility]


   Last Admin: 11/02/19 08:44 Dose:  Not Given


Heparin Sodium (Porcine) (Heparin Sodium)  5,000 units SUBCUT Q8H Formerly [facility], 1928–1983


   Last Admin: 11/01/19 19:58 Dose:  Not Given


Hydromorphone HCl (Dilaudid)  0.5 mg IVPUSH ONETIME ONE


   Stop: 11/01/19 17:23


   Last Admin: 11/01/19 17:28 Dose:  0.5 mg


Vancomycin HCl 1 gm/ Sodium (Chloride)  250 mls @ 166 mls/hr IV ONETIME ONE


   Stop: 11/01/19 18:13


   Last Admin: 11/01/19 17:25 Dose:  166 mls/hr


Meropenem 1 gm/ Sodium (Chloride)  100 mls @ 200 mls/hr IV Q8H Formerly [facility], 1928–1983


   Last Admin: 11/01/19 19:59 Dose:  Not Given


Meropenem 1 gm/ Sodium (Chloride)  100 mls @ 200 mls/hr IV Q8H Formerly [facility], 1928–1983


   Last Admin: 11/02/19 04:03 Dose:  200 mls/hr


Meropenem/Sodium Chloride 1 gm (/ Premix)  50 mls @ 100 mls/hr IV Q8H Formerly [facility], 1928–1983


   Last Admin: 11/02/19 11:06 Dose:  Not Given


Ketorolac Tromethamine (Toradol)  15 mg IVPUSH ONETIME ONE


   Stop: 11/01/19 17:21


   Last Admin: 11/01/19 17:28 Dose:  15 mg


Morphine Sulfate (Morphine)  1 mg IVPUSH Q2H PRN


   PRN Reason: Pain


Ondansetron HCl (Zofran)  4 mg IVPUSH ONETIME ONE


   Stop: 11/01/19 17:23


   Last Admin: 11/01/19 17:28 Dose:  4 mg


Ondansetron HCl (Zofran)  4 mg IVPUSH ONETIME ONE


   Stop: 11/01/19 17:25


   Last Admin: 11/01/19 17:35 Dose:  Not Given


Promethazine HCl (Phenergan)  25 mg PO Q6H PRN


   PRN Reason: Nausea/Vomiting


   Last Admin: 11/02/19 07:00 Dose:  25 mg


Promethazine HCl (Phenergan)  12.5 mg PO Q6H PRN


   PRN Reason: Nausea/Vomiting


   Last Admin: 11/03/19 07:23 Dose:  12.5 mg











- Exam


General: Alert, Oriented


Neck: Supple


Lungs: Clear to Auscultation, Normal Respiratory Effort


Cardiovascular: Regular Rate, Regular Rhythm


GI/Abdominal Exam: Soft, Non-Tender


Extremities: No Pedal Edema, Other (no change in  heel and ankle ulcer )





- Problem List Review


Problem List Initiated/Reviewed/Updated: Yes





- My Orders


Last 24 Hours: 


My Active Orders





11/03/19 08:54


Promethazine [Phenergan]   12.5 - 25 mg PO Q6H PRN 





11/03/19 21:00


Omeprazole   20 mg PO BEDTIME 














- Plan


Plan:: 


85 yo female admitted for cellulitis and ulcers of left foot.  


Cellulitis: We will continue meropenem and vancomycin. MRI of foot ordered


UTI: antibiotics as above

## 2019-11-03 NOTE — PCM.SN
- Free Text/Narrative


Note: 





Called for difficult iv placement on pt needing IV access.  20 lenard to left 

bicept. 12:$0 13:40

## 2019-11-04 LAB
BUN SERPL-MCNC: 9 MG/DL (ref 7–18)
CHLORIDE SERPL-SCNC: 103 MMOL/L (ref 98–107)
CO2 SERPL-SCNC: 26.2 MMOL/L (ref 21–32)
GLUCOSE SERPL-MCNC: 75 MG/DL (ref 74–106)
POTASSIUM SERPL-SCNC: 3.9 MMOL/L (ref 3.5–5.1)
SODIUM SERPL-SCNC: 139 MMOL/L (ref 136–145)

## 2019-11-04 RX ADMIN — MEROPENEM AND SODIUM CHLORIDE SCH MLS/HR: 1 INJECTION, SOLUTION INTRAVENOUS at 04:40

## 2019-11-04 RX ADMIN — MEROPENEM AND SODIUM CHLORIDE SCH MLS/HR: 1 INJECTION, SOLUTION INTRAVENOUS at 17:06

## 2019-11-04 RX ADMIN — OMEPRAZOLE SCH MG: 20 CAPSULE, DELAYED RELEASE ORAL at 21:01

## 2019-11-04 RX ADMIN — KETOROLAC TROMETHAMINE PRN MG: 30 INJECTION, SOLUTION INTRAMUSCULAR at 14:58

## 2019-11-04 RX ADMIN — KETOROLAC TROMETHAMINE PRN MG: 30 INJECTION, SOLUTION INTRAMUSCULAR at 09:12

## 2019-11-04 RX ADMIN — KETOROLAC TROMETHAMINE PRN MG: 30 INJECTION, SOLUTION INTRAMUSCULAR at 21:08

## 2019-11-04 RX ADMIN — SODIUM CHLORIDE, PRESERVATIVE FREE PRN ML: 5 INJECTION INTRAVENOUS at 17:07

## 2019-11-04 NOTE — PCM.CONS
H&P History of Present Illness





- General


Date of Service: 11/04/19


Admit Problem/Dx: 


 Admission Diagnosis/Problem





Admission Diagnosis/Problem      Cellulitis





Left medial malleolus, left heel, ball of foot and left fourth toe ulcers


History Limitations: Reports: No Limitations





- History of Present Illness


Onset of Symptoms: Reports: Unknown/Unsure


Duration of Symptoms: Reports: Chronic


Location: Reports: Lower Extremity, Left


Quality: Reports: Dull


Severity: Moderate


  ** Left Feet


Pain Score (Numeric/FACES): 6





- Related Data


Allergies/Adverse Reactions: 


 Allergies











Allergy/AdvReac Type Severity Reaction Status Date / Time


 


codeine Allergy  Vomiting Verified 11/01/19 15:30


 


latex Allergy  Rash Verified 11/01/19 15:30


 


morphine Allergy  Difficulty Verified 11/01/19 15:30





   Breathing  


 


oxycodone HCl [From Percocet] Allergy  Vomiting Verified 11/01/19 15:30


 


Penicillins Allergy  Anaphylactic Verified 11/01/19 15:30





   Shock  


 


Sulfa (Sulfonamide Allergy  Anaphylactic Verified 11/01/19 15:30





Antibiotics)   Shock  


 


all pain meds Allergy  Nausea Uncoded 11/01/19 15:30











Home Medications: 


 Home Meds





Omeprazole Magnesium [Prilosec Otc] 20 mg PO ACBRK 12/30/14 [History]


Levothyroxine [Synthroid] 88 mcg PO ACBREAKFAST 07/28/16 [History]


Bisacodyl 10 mg RC DAILY PRN 11/01/19 [History]


Cetirizine HCl 10 mg PO BEDTIME 11/01/19 [History]


Loperamide HCl [Imodium A-D] 4 mg PO Q6H PRN 11/01/19 [History]


Oxybutynin Chloride 5 mg PO BID 11/01/19 [History]


Pedi Mv No.79/Ferrous Fumarate [Flintstones with Iron Tab Chew] 1 tab PO DAILY 

11/01/19 [History]


amLODIPine Besylate [Norvasc] 2.5 mg PO DAILY 11/01/19 [History]


Acetaminophen [Tylenol] 650 mg PO TID PRN 11/02/19 [History]


Acetaminophen with Codeine [Tylenol with Codeine #3 Tablet] 1 tab PO Q8H PRN 11/ 02/19 [History]


Docusate Sodium 100 mg PO Q12H 11/02/19 [History]


Patient's Own Medication [Ptom] 1 each PO BID 11/02/19 [History]


traMADol [Ultram] 100 mg PO Q8H PRN 11/02/19 [History]











Past Medical History





- Past Health History


Medical/Surgical History: Denies Medical/Surgical History


HEENT History: Reports: Cataract


Other HEENT History: has upper denture and lower partial


Cardiovascular History: Reports: None


Respiratory History: Reports: None


Gastrointestinal History: Reports: GERD, Irritable Bowel Syndrome, Other (See 

Below)


Other Gastrointestinal History: IBS-Diarrhea


Genitourinary History: Reports: Other (See Below)


Other Genitourinary History: overactive bladder


OB/GYN History: Reports: Pregnancy


Musculoskeletal History: Reports: Arthritis


Neurological History: Reports: Other (See Below)


Other Neuro History: hx of motion sickness


Psychiatric History: Reports: None


Endocrine/Metabolic History: Reports: Hypothyroidism


Hematologic History: Reports: None


Immunologic History: Reports: Other (See Below)


Other Immunologic History: CREST syndrome


Oncologic (Cancer) History: Reports: None


Dermatologic History: Reports: None, Other (See Below)


Other Dermatologic History: Raynaud's Syndrome





- Infectious Disease History


Infectious Disease History: Reports: Chicken Pox, Measles, Mumps





- Past Surgical History


Head Surgeries/Procedures: Reports: None


HEENT Surgical History: Reports: Cataract Surgery


Female  Surgical History: Reports: Hysterectomy


Musculoskeletal Surgical History: Reports: Knee Replacement, ORIF, Shoulder 

Surgery





Social & Family History





- Family History


Family Medical History: Noncontributory


HEENT: Reports: Cataract


Respiratory: Reports: COPD


Musculoskeletal: Reports: Arthritis


Psychiatric: Reports: Depression


Endocrine/Metabolic: Reports: Diabetes, type II





- Tobacco Use


Smoking Status *Q: Never Smoker


Years of Tobacco use: 40


Used Tobacco, but Quit: Yes


Month/Year Tobacco Last Used: 45 years ago


Second Hand Smoke Exposure: No





- Caffeine Use


Caffeine Use: Reports: None





- Recreational Drug Use


Recreational Drug Use: No





H&P Review of Systems





- Review of Systems:


Review Of Systems: ROS reveals no pertinent complaints other than HPI.





Exam





- Exam


Exam: See Below





- Vital Signs


Vital Signs: 


 Last Vital Signs











Temp  36.4 C   11/04/19 12:00


 


Pulse  107 H  11/04/19 12:00


 


Resp  16   11/04/19 12:00


 


BP  140/63   11/04/19 12:00


 


Pulse Ox  94 L  11/04/19 08:00











Weight: 48.988 kg





- Exam


Extremities: Pedal Edema (moderate pitting bilateral), Slow Capillary Refill (

bilateral)


Peripheral Pulses: 0: Posterior Tibial (L) (not palpable, complicated by edema)

, Posterior Tibial (R) (not palpable, complicated by edema), Dorsalis Pedis (L)

, Dorsalis Pedis (R)


Skin: Decubitis (subcutaneous fibrotic ulcers left heel, left medial malleolus, 

ball of left foot, left fourth toe)





- Patient Data


Lab Results Last 24 hrs: 


 Laboratory Results - last 24 hr











  11/04/19 11/04/19 Range/Units





  06:09 06:09 


 


WBC  12.69 H   (4.0-11.0)  K/uL


 


RBC  3.72 L   (4.30-5.90)  M/uL


 


Hgb  11.0 L   (12.0-16.0)  g/dL


 


Hct  33.8 L   (36.0-46.0)  %


 


MCV  90.9   (80.0-98.0)  fL


 


MCH  29.6   (27.0-32.0)  pg


 


MCHC  32.5   (31.0-37.0)  g/dL


 


RDW Std Deviation  43.7   (28.0-62.0)  fl


 


RDW Coeff of Rai  13   (11.0-15.0)  %


 


Plt Count  307   (150-400)  K/uL


 


MPV  9.10   (7.40-12.00)  fL


 


Neut % (Auto)  72.6   (48.0-80.0)  %


 


Lymph % (Auto)  12.1 L   (16.0-40.0)  %


 


Mono % (Auto)  6.5   (0.0-15.0)  %


 


Eos % (Auto)  8.6 H   (0.0-7.0)  %


 


Baso % (Auto)  0.2   (0.0-1.5)  %


 


Neut # (Auto)  9.2 H   (1.4-5.7)  K/uL


 


Lymph # (Auto)  1.5   (0.6-2.4)  K/uL


 


Mono # (Auto)  0.8   (0.0-0.8)  K/uL


 


Eos # (Auto)  1.1 H   (0.0-0.7)  K/uL


 


Baso # (Auto)  0.0   (0.0-0.1)  K/uL


 


Nucleated RBC %  0.0   /100WBC


 


Nucleated RBCs #  0   K/uL


 


ESR  28   (0-29)  mm/hr


 


Sodium   139  (136-145)  mmol/L


 


Potassium   3.9  (3.5-5.1)  mmol/L


 


Chloride   103  ()  mmol/L


 


Carbon Dioxide   26.2  (21.0-32.0)  mmol/L


 


BUN   9  (7.0-18.0)  mg/dL


 


Creatinine   0.7  (0.6-1.0)  mg/dL


 


Est Cr Clr Drug Dosing   45.95  mL/min


 


Estimated GFR (MDRD)   > 60.0  ml/min


 


Glucose   75  ()  mg/dL


 


Calcium   8.6  (8.5-10.1)  mg/dL


 


Total Bilirubin   0.3  (0.2-1.0)  mg/dL


 


AST   19  (15-37)  IU/L


 


ALT   16  (14-63)  IU/L


 


Alkaline Phosphatase   124 H  ()  U/L


 


C-Reactive Protein   6.10 H  (0.00-0.90)  mg/dL


 


Total Protein   5.7 L  (6.4-8.2)  g/dL


 


Albumin   2.2 L  (3.4-5.0)  g/dL


 


Globulin   3.5  (2.6-4.0)  g/dL


 


Albumin/Globulin Ratio   0.6 L  (0.9-1.6)  











Result Diagrams: 


 11/04/19 06:09





 11/04/19 06:09


Jose J Results Last 24 hrs: 


 Microbiology











 11/01/19 16:16 Aerobic Blood Culture - Preliminary





 Blood - Venous - Lab Draw    NO GROWTH AFTER 3 DAYS





 Anaerobic Blood Culture - Final


 


 11/01/19 16:05 Aerobic Blood Culture - Preliminary





 Blood - Venous    NO GROWTH AFTER 3 DAYS





 Anaerobic Blood Culture - Final














Consult PN Assessment/Plan


Procedures: 


Procedures





ACUTE HEPATITIS PANEL (09/01/17)


ASSAY OF AMYLASE (09/01/17)


ASSAY OF IRON (09/01/17)


ASSAY OF LACTIC ACID (09/01/17)


ASSAY OF LIPASE (09/01/17)


BLOOD CULTURE FOR BACTERIA (09/01/17)


BLOOD TYPING SEROLOGIC ABO (12/29/14)


BLOOD TYPING SEROLOGIC RH(D) (12/29/14)


BODY FLUID CELL COUNT (01/26/18)


C-REACTIVE PROTEIN (09/01/17)


CHEST X-RAY 1 VIEW FRONTAL (09/01/17)


COMPATIBILITY TEST ANTIGLOB (12/29/14)


COMPATIBILITY TEST INCUBATE (12/29/14)


COMPATIBILITY TEST SPIN (12/29/14)


COMPLETE CBC AUTOMATED (04/29/16)


COMPLETE CBC W/AUTO DIFF WBC (09/01/17)


COMPREHEN METABOLIC PANEL (09/01/17)


CT LOWER EXTREMITY W/DYE (03/29/17)


CT LOWER EXTREMITY W/O DYE (07/20/17)


CULTR BACTERIA EXCEPT BLOOD (08/27/16)


CULTURE OTHR SPECIMN AEROBIC (01/26/18)


CYTOPATH FL NONGYN SMEARS (01/26/18)


MAGGIE MUSC/FASCIA 20 SQ CM/< (07/29/16)


MAGGIE SUBQ TISSUE 20 SQ CM/< (04/29/16)


DECALCIFY TISSUE (12/29/14)


ECHO EXAM OF ABDOMEN (09/01/17)


ECHO GUIDE FOR BIOPSY (01/26/18)


ELECTROCARDIOGRAM TRACING (04/29/16)


EMERGENCY DEPT VISIT (09/01/17)


EMERGENCY DEPT VISIT (07/17/17)


EXAM SYNOVIAL FLUID CRYSTALS (01/26/18)


EXTREMITY STUDY (08/31/16)


FLUOROSCOPY <1 HR PHYS/QHP (11/21/14)


FUNGUS ISOLATION CULTURE (01/26/18)


GAIT TRAINING THERAPY (03/27/15)


HEMATOCRIT (12/29/14)


HEMOGLOBIN (12/29/14)


HOT OR COLD PACKS THERAPY (03/27/15)


HYDRATE IV INFUSION ADD-ON (09/01/17)


INSJ PICC 5 YR+ W/O IMAGING (08/28/17)


LEG SURGERY PROCEDURE (12/29/14)


METABOLIC PANEL TOTAL CA (08/28/17)


MRI JNT OF LWR EXTRE W/O DYE (11/22/14)


OFFICE/OUTPATIENT VISIT EST (08/29/16)


OFFICE/OUTPATIENT VISIT EST (03/31/15)


PROTHROMBIN TIME (09/01/17)


PT EVALUATION (01/30/15)


PUNCTURE DRAINAGE OF LESION (01/26/18)


RBC ANTIBODY SCREEN (12/29/14)


RBC SED RATE AUTOMATED (09/01/17)


REMOVAL OF SUPPORT IMPLANT (11/21/14)


ROUTINE VENIPUNCTURE (09/01/17)


SMEAR GRAM STAIN (01/26/18)


THER/PROPH/DIAG INJ IV PUSH (09/01/17)


THER/PROPH/DIAG INJ SC/IM (08/27/16)


THER/PROPH/DIAG IV INF INIT (07/17/17)


THERAPEUTIC EXERCISES (03/27/15)


TISSUE EXAM BY PATHOLOGIST (12/29/14)


TOTAL KNEE ARTHROPLASTY (12/29/14)


TX/PRO/DX INJ NEW DRUG ADDON (09/01/17)


TX/PRO/DX INJ SAME DRUG ADON (09/01/17)


URINALYSIS AUTO W/SCOPE (12/29/14)


URINE BACTERIA CULTURE (12/29/14)


US GUIDE VASCULAR ACCESS (08/28/17)


VASOPNEUMATIC DEVICE THERAPY (02/26/15)


X-RAY EXAM KNEE 4 OR MORE (07/28/16)


X-RAY EXAM OF ANKLE (11/20/14)


X-RAY EXAM OF FOOT (04/07/14)


X-RAY EXAM OF KNEE 1 OR 2 (09/01/17)


X-RAY EXAM OF KNEE 3 (08/28/17)


X-RAY EXAM OF LOWER LEG (08/27/16)








(1) Decubitus ulcer of heel, left, unstageable


SNOMED Code(s): 184015545


   Code(s): L89.620 - PRESSURE ULCER OF LEFT HEEL, UNSTAGEABLE   Priority: High

   Current Visit: Yes   


Assessment:: 


most likely this ulcer is limited to deep subcutaneous level as are the ulcers 

on the medial malleolus and ball of foot as well as the fourth toe left foot.





Problem List Initiated/Reviewed/Updated: Yes


Plan: 





I discussed with Dr. Camarena that patient can be treated on outpatient basis 

for her left foot and ankle ulcers.  I urged ordering of arterial ultrasound 

for bilateral lower extremities.  This can also be done after discharge if not 

done before.  Patient is instructed to minimize weight bearing to left lower 

extremity and specifically instructed that the heels of her left foot should be 

offloaded and not be allowed to rest on the bed.  I will see this patient after 

discharge if she is referred and presents for an appointment in my office.

## 2019-11-04 NOTE — PCM.PN
- General Info


Date of Service: 11/04/19


Subjective Update: 





Afebrile. No acute events overnight. states her foot feels better today. 

Erythema, swelling, and pain going down.





- Patient Data


Vitals - Most Recent: 


 Last Vital Signs











Temp  36.6 C   11/04/19 08:00


 


Pulse  96   11/04/19 08:00


 


Resp  17   11/04/19 08:00


 


BP  137/98 H  11/04/19 08:00


 


Pulse Ox  94 L  11/04/19 08:00











Weight - Most Recent: 48.988 kg


I&O - Last 24 Hours: 


 Intake & Output











 11/03/19 11/04/19 11/04/19





 22:59 06:59 14:59


 


Intake Total 900 850 


 


Output Total  1300 


 


Balance 900 -450 











Lab Results Last 24 Hours: 


 Laboratory Results - last 24 hr











  11/03/19 11/04/19 11/04/19 Range/Units





  16:38 06:09 06:09 


 


WBC   12.69 H   (4.0-11.0)  K/uL


 


RBC   3.72 L   (4.30-5.90)  M/uL


 


Hgb   11.0 L   (12.0-16.0)  g/dL


 


Hct   33.8 L   (36.0-46.0)  %


 


MCV   90.9   (80.0-98.0)  fL


 


MCH   29.6   (27.0-32.0)  pg


 


MCHC   32.5   (31.0-37.0)  g/dL


 


RDW Std Deviation   43.7   (28.0-62.0)  fl


 


RDW Coeff of Rai   13   (11.0-15.0)  %


 


Plt Count   307   (150-400)  K/uL


 


MPV   9.10   (7.40-12.00)  fL


 


Neut % (Auto)   72.6   (48.0-80.0)  %


 


Lymph % (Auto)   12.1 L   (16.0-40.0)  %


 


Mono % (Auto)   6.5   (0.0-15.0)  %


 


Eos % (Auto)   8.6 H   (0.0-7.0)  %


 


Baso % (Auto)   0.2   (0.0-1.5)  %


 


Neut # (Auto)   9.2 H   (1.4-5.7)  K/uL


 


Lymph # (Auto)   1.5   (0.6-2.4)  K/uL


 


Mono # (Auto)   0.8   (0.0-0.8)  K/uL


 


Eos # (Auto)   1.1 H   (0.0-0.7)  K/uL


 


Baso # (Auto)   0.0   (0.0-0.1)  K/uL


 


Nucleated RBC %   0.0   /100WBC


 


Nucleated RBCs #   0   K/uL


 


ESR   28   (0-29)  mm/hr


 


Sodium    139  (136-145)  mmol/L


 


Potassium    3.9  (3.5-5.1)  mmol/L


 


Chloride    103  ()  mmol/L


 


Carbon Dioxide    26.2  (21.0-32.0)  mmol/L


 


BUN    9  (7.0-18.0)  mg/dL


 


Creatinine    0.7  (0.6-1.0)  mg/dL


 


Est Cr Clr Drug Dosing    45.95  mL/min


 


Estimated GFR (MDRD)    > 60.0  ml/min


 


Glucose    75  ()  mg/dL


 


Calcium    8.6  (8.5-10.1)  mg/dL


 


Total Bilirubin    0.3  (0.2-1.0)  mg/dL


 


AST    19  (15-37)  IU/L


 


ALT    16  (14-63)  IU/L


 


Alkaline Phosphatase    124 H  ()  U/L


 


C-Reactive Protein    6.10 H  (0.00-0.90)  mg/dL


 


Total Protein    5.7 L  (6.4-8.2)  g/dL


 


Albumin    2.2 L  (3.4-5.0)  g/dL


 


Globulin    3.5  (2.6-4.0)  g/dL


 


Albumin/Globulin Ratio    0.6 L  (0.9-1.6)  


 


Vancomycin Trough  6.8    (5.0-10.0)  ug/mL











Jose J Results Last 24 Hours: 


 Microbiology











 11/01/19 16:16 Aerobic Blood Culture - Preliminary





 Blood - Venous - Lab Draw    NO GROWTH AFTER 2 DAYS





 Anaerobic Blood Culture - Final


 


 11/01/19 16:05 Aerobic Blood Culture - Preliminary





 Blood - Venous    NO GROWTH AFTER 2 DAYS





 Anaerobic Blood Culture - Final


 


 11/01/19 22:15 Urine Culture - Final





 Urine, Clean Catch    MIXED MIREYA <1000 CFU/ML











Med Orders - Current: 


 Current Medications





Acetaminophen (Tylenol)  650 mg PO Q4H PRN


   PRN Reason: Pain (Mild 1-3)/fever


   Last Admin: 11/04/19 04:40 Dose:  650 mg


Amlodipine Besylate (Norvasc)  2.5 mg PO DAILY [facility]


   Last Admin: 11/03/19 08:45 Dose:  2.5 mg


Bisacodyl (Dulcolax)  10 mg PO DAILY PRN


   PRN Reason: Constipation


Enoxaparin Sodium (Lovenox)  40 mg SUBCUT Q24H [facility]


   Last Admin: 11/03/19 18:20 Dose:  40 mg


Hydromorphone HCl (Dilaudid)  0.5 mg IVPUSH Q3H PRN


   PRN Reason: Pain


   Last Admin: 11/04/19 08:06 Dose:  0.5 mg


Vancomycin HCl 1.25 gm/ Sodium (Chloride)  250 mls @ 166.667 mls/hr IV Q24H [facility]


   Last Admin: 11/03/19 17:28 Dose:  166.667 mls/hr


Meropenem/Sodium Chloride 1 gm (/ Premix)  50 mls @ 100 mls/hr IV Q12H [facility]


Ketorolac Tromethamine (Toradol)  15 mg IVPUSH Q6H PRN


   PRN Reason: Pain


   Last Admin: 11/03/19 22:59 Dose:  15 mg


Levothyroxine Sodium (Synthroid)  88 mcg PO ACBREAKFAST [facility]


   Last Admin: 11/04/19 07:41 Dose:  88 mcg


Lorazepam (Ativan)  1 mg PO ONETIME PRN


   PRN Reason: Anxiety


   Last Admin: 11/04/19 07:41 Dose:  1 mg


Omeprazole (Omeprazole)  20 mg PO BEDTIME [facility]


   Last Admin: 11/03/19 20:42 Dose:  20 mg


Polyethylene Glycol (Miralax)  17 gm PO BID PRN


   PRN Reason: Constipation


Promethazine HCl (Phenergan)  12.5 - 25 mg PO Q6H PRN


   PRN Reason: Nausea/Vomiting


   Last Admin: 11/04/19 05:19 Dose:  12.5 mg


Sodium Chloride (Saline Flush)  10 ml FLUSH ASDIRECTED PRN


   PRN Reason: Keep Vein Open


   Last Admin: 11/01/19 17:26 Dose:  10 ml


Sodium Chloride (Saline Flush)  2.5 ml FLUSH ASDIRECTED PRN


   PRN Reason: Keep Vein Open


   Last Admin: 11/01/19 17:25 Dose:  2.5 ml


Tramadol HCl (Ultram)  100 mg PO Q8H PRN


   PRN Reason: Pain


   Last Admin: 11/04/19 04:40 Dose:  100 mg


Vancomycin HCl (Pharmacy To Dose - Vancomycin)  1 dose .XX ASDIRECTED [facility]





Discontinued Medications





Amlodipine Besylate (Norvasc)  10 mg PO DAILY [facility]


   Last Admin: 11/02/19 08:44 Dose:  Not Given


Heparin Sodium (Porcine) (Heparin Sodium)  5,000 units SUBCUT Q8H [facility]


   Last Admin: 11/01/19 19:58 Dose:  Not Given


Hydromorphone HCl (Dilaudid)  0.5 mg IVPUSH ONETIME ONE


   Stop: 11/01/19 17:23


   Last Admin: 11/01/19 17:28 Dose:  0.5 mg


Vancomycin HCl 1 gm/ Sodium (Chloride)  250 mls @ 166 mls/hr IV ONETIME ONE


   Stop: 11/01/19 18:13


   Last Admin: 11/01/19 17:25 Dose:  166 mls/hr


Sodium Chloride (Normal Saline)  1,000 mls @ 75 mls/hr IV ASDIRECTED [facility]


   Last Admin: 11/03/19 04:08 Dose:  75 mls/hr


Meropenem 1 gm/ Sodium (Chloride)  100 mls @ 200 mls/hr IV Q8H [facility]


   Last Admin: 11/01/19 19:59 Dose:  Not Given


Meropenem 1 gm/ Sodium (Chloride)  100 mls @ 200 mls/hr IV Q8H [facility]


   Last Admin: 11/02/19 04:03 Dose:  200 mls/hr


Vancomycin HCl 0.75 gm/ Sodium (Chloride)  250 mls @ 166.667 mls/hr IV Q24H [facility]


   Last Admin: 11/03/19 17:21 Dose:  Not Given


Meropenem/Sodium Chloride 1 gm (/ Premix)  50 mls @ 100 mls/hr IV Q8H [facility]


   Last Admin: 11/02/19 11:06 Dose:  Not Given


Meropenem/Sodium Chloride 1 gm (/ Premix)  50 mls @ 100 mls/hr IV Q8H [facility]


   Last Admin: 11/04/19 04:40 Dose:  100 mls/hr


Ketorolac Tromethamine (Toradol)  15 mg IVPUSH ONETIME ONE


   Stop: 11/01/19 17:21


   Last Admin: 11/01/19 17:28 Dose:  15 mg


Morphine Sulfate (Morphine)  1 mg IVPUSH Q2H PRN


   PRN Reason: Pain


Ondansetron HCl (Zofran)  4 mg IVPUSH ONETIME ONE


   Stop: 11/01/19 17:23


   Last Admin: 11/01/19 17:28 Dose:  4 mg


Ondansetron HCl (Zofran)  4 mg IVPUSH ONETIME ONE


   Stop: 11/01/19 17:25


   Last Admin: 11/01/19 17:35 Dose:  Not Given


Promethazine HCl (Phenergan)  25 mg PO Q6H PRN


   PRN Reason: Nausea/Vomiting


   Last Admin: 11/02/19 07:00 Dose:  25 mg


Promethazine HCl (Phenergan)  12.5 mg PO Q6H PRN


   PRN Reason: Nausea/Vomiting


   Last Admin: 11/03/19 07:23 Dose:  12.5 mg











- Exam


General: Alert, Oriented, Cooperative, No Acute Distress


Lungs: Clear to Auscultation, Normal Respiratory Effort


Cardiovascular: Regular Rate, Regular Rhythm


GI/Abdominal Exam: Soft, Non-Tender, No Distention


Extremities: Other (Left foot- decreased swelling and erythema. Sores still 

present.)


Skin: Warm, Dry





- Problem List Review


Problem List Initiated/Reviewed/Updated: Yes





- My Orders


Last 24 Hours: 


My Active Orders





11/04/19 07:23


Bisacodyl [Dulcolax]   10 mg PO DAILY PRN 





11/04/19 07:24


Polyethylene Glycol 3350 [MiraLAX]   17 gm PO BID PRN 





11/04/19 08:00


Foot wo Cont Lt [MR] Routine 





11/04/19 09:30


CV Ankle Brachial Index (DAO) [US] Routine 





11/04/19 17:00


Meropenem Premix [Meropenem] 1 gm   Premix Bag 1 bag IV Q12H 














- Plan


Plan:: 





A:


1. suspected osteomyelitis of left foot


2. UTI


3. PMH hypothyroidism, Raynaud's 





P:


1. Suspected osteomyelitis, improving. Will continue with Meropenem and 

vancomycin. Will get MRI of left foot today. Continue with daily dressing 

changes. Pain control.





Dispo: pending MRI results

## 2019-11-04 NOTE — US
Ankle brachial index



DAO within the right lower extremity is 0.9 and DAO within the left lower 
extremity is 0.7



Impression:

1.  Borderline DAO value within the right lower extremity.

2.  Abnormal DAO value within the left lower extremity indicating moderate 
peripheral vascular disease.



Diagnostic code #3
MTDD

## 2019-11-05 VITALS — DIASTOLIC BLOOD PRESSURE: 75 MMHG | HEART RATE: 98 BPM | SYSTOLIC BLOOD PRESSURE: 138 MMHG

## 2019-11-05 LAB
BUN SERPL-MCNC: 10 MG/DL (ref 7–18)
CHLORIDE SERPL-SCNC: 103 MMOL/L (ref 98–107)
CO2 SERPL-SCNC: 24.1 MMOL/L (ref 21–32)
GLUCOSE SERPL-MCNC: 81 MG/DL (ref 74–106)
POTASSIUM SERPL-SCNC: 4 MMOL/L (ref 3.5–5.1)
SODIUM SERPL-SCNC: 138 MMOL/L (ref 136–145)

## 2019-11-05 RX ADMIN — MEROPENEM AND SODIUM CHLORIDE SCH MLS/HR: 1 INJECTION, SOLUTION INTRAVENOUS at 05:00

## 2019-11-05 RX ADMIN — KETOROLAC TROMETHAMINE PRN MG: 30 INJECTION, SOLUTION INTRAMUSCULAR at 04:15

## 2019-11-05 NOTE — PCM.SN
- Free Text/Narrative


Note: 





Patient has superficial decubitus ulcers on her left foot mostly located on the 

medial malleolus, heel and sole. In addition, she has a necrotic cuneiform 

bone. No osteomyelitis. She will need to follow-up with Dr. Jacob, Podiatry as 

outpatient. In addition, she will need to be weight non-bearing on her left 

foot until evaluated by podiatry as outpatient. She will need daily dressing 

changes. In addition to PT/OT since patient has some limitations due to her 

left foot ulcers.

## 2019-11-05 NOTE — [id number]
Addendum entered and electronically signed by Luis Aguilar MD  11/05 /19 11:40: 








**Discharge Summary





- Hospital Course


Free Text/Narrative:: 





Patient will need Physical therapy for further rehab due to her left foot 

ulcers and non weight bearing on her left foot. 





- Discharge Data


Discharge Date: 11/05/19


Discharge Disposition: DC/Tfer to [facility]


Condition: Stable





- Referral to Home Health


Primary Care Physician: 


PCP Unobtainable








- Patient Summary/Data


Consults: 


 Consultations





11/01/19 17:33


Consult to Wound Care Services [CONS] Stat 





11/01/19 19:20


Consult to Physician [CONS] Routine 





11/04/19 12:00


Consult to Physician [CONS] Routine 














- Patient Instructions


Diet: Regular Diet as Tolerated


Activity: Non Weight Bearing


Activity, Other: No weightbearing on left foot until evaluated by podiatry 

outpatient.


Wound/Incision Care: Change Dressing Daily


Notify Provider of: Fever, Increased Pain, Swelling and Redness, Drainage, 

Nausea and/or Vomiting





- Discharge Plan


*PRESCRIPTION DRUG MONITORING PROGRAM REVIEWED*: No


*COPY OF PRESCRIPTION DRUG MONITORING REPORT IN PATIENT TG: No


Prescriptions/Med Rec: 


Clindamycin HCl 300 mg PO TID 7 Days #21 capsule


Home Medications: 


 Home Meds





Omeprazole Magnesium [Prilosec Otc] 20 mg PO ACBRK 12/30/14 [History]


Levothyroxine [Synthroid] 88 mcg PO ACBREAKFAST 07/28/16 [History]


Bisacodyl 10 mg RC DAILY PRN 11/01/19 [History]


Cetirizine HCl 10 mg PO BEDTIME 11/01/19 [History]


Loperamide HCl [Imodium A-D] 4 mg PO Q6H PRN 11/01/19 [History]


Oxybutynin Chloride 5 mg PO BID 11/01/19 [History]


Pedi Mv No.79/Ferrous Fumarate [Flintstones with Iron Tab Chew] 1 tab PO DAILY 

11/01/19 [History]


amLODIPine Besylate [Norvasc] 2.5 mg PO DAILY 11/01/19 [History]


Acetaminophen [Tylenol] 650 mg PO TID PRN 11/02/19 [History]


Acetaminophen with Codeine [Tylenol with Codeine #3 Tablet] 1 tab PO Q8H PRN 11/ 02/19 [History]


Docusate Sodium 100 mg PO Q12H 11/02/19 [History]


Patient's Own Medication [Ptom] 1 each PO BID 11/02/19 [History]


traMADol [Ultram] 100 mg PO Q8H PRN 11/02/19 [History]


Clindamycin HCl 300 mg PO TID 7 Days #21 capsule 11/05/19 [Rx]








Referrals: 


[facility] [Outside]


Escobar Noguera MD [Physician] - 


(next [place] rounds


)


Alex Jacob DPM [Physician] - 11/06/19 9:00 am





- Discharge Summary/Plan Comment


DC Time >30 min.: No





- Patient Data


Vitals - Most Recent: 


 Last Vital Signs











Temp  36.7 C   11/05/19 07:30


 


Pulse  101 H  11/05/19 07:30


 


Resp  18   11/05/19 07:30


 


BP  148/65 H  11/05/19 08:19


 


Pulse Ox  95   11/05/19 07:30











Weight - Most Recent: 48.988 kg


I&O - Last 24 hours: 


 Intake & Output











 11/04/19 11/05/19 11/05/19





 22:59 06:59 14:59


 


Intake Total 550 900 


 


Output Total 300 1600 


 


Balance 250 -700 











Lab Results - Last 24 hrs: 


 Laboratory Results - last 24 hr











  11/05/19 11/05/19 Range/Units





  05:05 05:05 


 


WBC  12.54 H   (4.0-11.0)  K/uL


 


RBC  3.80 L   (4.30-5.90)  M/uL


 


Hgb  11.2 L   (12.0-16.0)  g/dL


 


Hct  34.0 L   (36.0-46.0)  %


 


MCV  89.5   (80.0-98.0)  fL


 


MCH  29.5   (27.0-32.0)  pg


 


MCHC  32.9   (31.0-37.0)  g/dL


 


RDW Std Deviation  43.6   (28.0-62.0)  fl


 


RDW Coeff of Rai  13   (11.0-15.0)  %


 


Plt Count  286   (150-400)  K/uL


 


MPV  9.30   (7.40-12.00)  fL


 


Neut % (Auto)  72.0   (48.0-80.0)  %


 


Lymph % (Auto)  11.6 L   (16.0-40.0)  %


 


Mono % (Auto)  7.7   (0.0-15.0)  %


 


Eos % (Auto)  8.5 H   (0.0-7.0)  %


 


Baso % (Auto)  0.2   (0.0-1.5)  %


 


Neut # (Auto)  9.0 H   (1.4-5.7)  K/uL


 


Lymph # (Auto)  1.5   (0.6-2.4)  K/uL


 


Mono # (Auto)  1.0 H   (0.0-0.8)  K/uL


 


Eos # (Auto)  1.1 H   (0.0-0.7)  K/uL


 


Baso # (Auto)  0.0   (0.0-0.1)  K/uL


 


Nucleated RBC %  0.0   /100WBC


 


Nucleated RBCs #  0   K/uL


 


Sodium   138  (136-145)  mmol/L


 


Potassium   4.0  (3.5-5.1)  mmol/L


 


Chloride   103  ()  mmol/L


 


Carbon Dioxide   24.1  (21.0-32.0)  mmol/L


 


BUN   10  (7.0-18.0)  mg/dL


 


Creatinine   0.7  (0.6-1.0)  mg/dL


 


Est Cr Clr Drug Dosing   45.95  mL/min


 


Estimated GFR (MDRD)   > 60.0  ml/min


 


Glucose   81  ()  mg/dL


 


Calcium   8.5  (8.5-10.1)  mg/dL


 


Total Bilirubin   0.3  (0.2-1.0)  mg/dL


 


AST   17  (15-37)  IU/L


 


ALT   17  (14-63)  IU/L


 


Alkaline Phosphatase   127 H  ()  U/L


 


Total Protein   5.9 L  (6.4-8.2)  g/dL


 


Albumin   2.3 L  (3.4-5.0)  g/dL


 


Globulin   3.6  (2.6-4.0)  g/dL


 


Albumin/Globulin Ratio   0.6 L  (0.9-1.6)  











[facility] Results - Last 24 hrs: 


 Microbiology











 11/05/19 08:15 Clostridium difficile Toxin A & B - Final





 Stool / Feces    Negative for C.Diff Toxin/AG





    REFERENCE RANGE: NEGATIVE


 


 11/01/19 16:16 Aerobic Blood Culture - Preliminary





 Blood - Venous - Lab Draw    NO GROWTH AFTER 3 DAYS





 Anaerobic Blood Culture - Final


 


 11/01/19 16:05 Aerobic Blood Culture - Preliminary





 Blood - Venous    NO GROWTH AFTER 3 DAYS





 Anaerobic Blood Culture - Final











Med Orders - Current: 


 Current Medications





Acetaminophen (Tylenol)  650 mg PO Q4H PRN


   PRN Reason: Pain (Mild 1-3)/fever


   Last Admin: 11/05/19 08:20 Dose:  650 mg


Amlodipine Besylate (Norvasc)  2.5 mg PO DAILY [facility]


   Last Admin: 11/05/19 08:19 Dose:  2.5 mg


Bisacodyl (Dulcolax)  10 mg PO DAILY PRN


   PRN Reason: Constipation


Enoxaparin Sodium (Lovenox)  40 mg SUBCUT Q24H [facility]


   Last Admin: 11/04/19 18:45 Dose:  40 mg


Vancomycin HCl 1.25 gm/ Sodium (Chloride)  250 mls @ 166.667 mls/hr IV Q24H [facility]


   Last Infusion: 11/04/19 18:57 Dose:  100 mls/hr


Meropenem/Sodium Chloride 1 gm (/ Premix)  50 mls @ 100 mls/hr IV Q12H [facility]


   Last Admin: 11/05/19 05:00 Dose:  100 mls/hr


Ketorolac Tromethamine (Toradol)  15 mg IVPUSH Q6H PRN


   PRN Reason: Pain


   Last Admin: 11/05/19 04:15 Dose:  15 mg


Levothyroxine Sodium (Synthroid)  88 mcg PO ACBREAKFAST [facility]


   Last Admin: 11/05/19 08:19 Dose:  88 mcg


Lorazepam (Ativan)  1 mg PO ONETIME PRN


   PRN Reason: Anxiety


   Last Admin: 11/04/19 07:41 Dose:  1 mg


Omeprazole (Omeprazole)  20 mg PO BEDTIME [facility]


   Last Admin: 11/04/19 21:01 Dose:  20 mg


Polyethylene Glycol (Miralax)  17 gm PO BID PRN


   PRN Reason: Constipation


Promethazine HCl (Phenergan)  12.5 - 25 mg PO Q6H PRN


   PRN Reason: Nausea/Vomiting


   Last Admin: 11/05/19 00:18 Dose:  12.5 mg


Sodium Chloride (Saline Flush)  10 ml FLUSH ASDIRECTED PRN


   PRN Reason: Keep Vein Open


   Last Admin: 11/01/19 17:26 Dose:  10 ml


Sodium Chloride (Saline Flush)  2.5 ml FLUSH ASDIRECTED PRN


   PRN Reason: Keep Vein Open


   Last Admin: 11/04/19 17:07 Dose:  2.5 ml


Tramadol HCl (Ultram)  100 mg PO Q8H PRN


   PRN Reason: Pain


   Last Admin: 11/05/19 08:20 Dose:  100 mg


Vancomycin HCl (Pharmacy To Dose - Vancomycin)  1 dose .XX ASDIRECTED [facility]





Discontinued Medications





Amlodipine Besylate (Norvasc)  10 mg PO DAILY [facility]


   Last Admin: 11/02/19 08:44 Dose:  Not Given


Heparin Sodium (Porcine) (Heparin Sodium)  5,000 units SUBCUT Q8H [facility]


   Last Admin: 11/01/19 19:58 Dose:  Not Given


Hydromorphone HCl (Dilaudid)  0.5 mg IVPUSH ONETIME ONE


   Stop: 11/01/19 17:23


   Last Admin: 11/01/19 17:28 Dose:  0.5 mg


Hydromorphone HCl (Dilaudid)  0.5 mg IVPUSH Q3H PRN


   PRN Reason: Pain


   Last Admin: 11/04/19 08:06 Dose:  0.5 mg


Vancomycin HCl 1 gm/ Sodium (Chloride)  250 mls @ 166 mls/hr IV ONETIME ONE


   Stop: 11/01/19 18:13


   Last Admin: 11/01/19 17:25 Dose:  166 mls/hr


Sodium Chloride (Normal Saline)  1,000 mls @ 75 mls/hr IV ASDIRECTED [facility]


   Last Admin: 11/03/19 04:08 Dose:  75 mls/hr


Meropenem 1 gm/ Sodium (Chloride)  100 mls @ 200 mls/hr IV Q8H [facility]


   Last Admin: 11/01/19 19:59 Dose:  Not Given


Meropenem 1 gm/ Sodium (Chloride)  100 mls @ 200 mls/hr IV Q8H [facility]


   Last Admin: 11/02/19 04:03 Dose:  200 mls/hr


Vancomycin HCl 0.75 gm/ Sodium (Chloride)  250 mls @ 166.667 mls/hr IV Q24H [facility]


   Last Admin: 11/03/19 17:21 Dose:  Not Given


Meropenem/Sodium Chloride 1 gm (/ Premix)  50 mls @ 100 mls/hr IV Q8H [facility]


   Last Admin: 11/02/19 11:06 Dose:  Not Given


Meropenem/Sodium Chloride 1 gm (/ Premix)  50 mls @ 100 mls/hr IV Q8H [facility]


   Last Admin: 11/04/19 04:40 Dose:  100 mls/hr


Ketorolac Tromethamine (Toradol)  15 mg IVPUSH ONETIME ONE


   Stop: 11/01/19 17:21


   Last Admin: 11/01/19 17:28 Dose:  15 mg


Morphine Sulfate (Morphine)  1 mg IVPUSH Q2H PRN


   PRN Reason: Pain


Ondansetron HCl (Zofran)  4 mg IVPUSH ONETIME ONE


   Stop: 11/01/19 17:23


   Last Admin: 11/01/19 17:28 Dose:  4 mg


Ondansetron HCl (Zofran)  4 mg IVPUSH ONETIME ONE


   Stop: 11/01/19 17:25


   Last Admin: 11/01/19 17:35 Dose:  Not Given


Promethazine HCl (Phenergan)  25 mg PO Q6H PRN


   PRN Reason: Nausea/Vomiting


   Last Admin: 11/02/19 07:00 Dose:  25 mg


Promethazine HCl (Phenergan)  12.5 mg PO Q6H PRN


   PRN Reason: Nausea/Vomiting


   Last Admin: 11/03/19 07:23 Dose:  12.5 mg











Original Note:








<Luis Aguilar - Last Filed: 11/05/19 11:39>





**Discharge Summary





- Hospital Course


Free Text/Narrative:: 





Agata Sebastian is an 83 y/o female with history of Raynaud's syndrome who 

presented to the ER complaining of worsening left foot fait, ulcers.  Patient 

was admitted for suspected osteomyelitis of the left foot.  She was started on 

Meropenem and Vancomycin. Her initial WBC was 19K and was down to WBC 12K at 

time of discharge. ESR was 44 and CRP 10, which both came down to 28 and 6, 

respectively.  She remained afebrile and pain was controlled. MRI of the left 

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 

cuneiform bone. Dr. Jacob, Podiatry was consulted who evaluated the patient and 

recommended outpatient follow-up.  In addition, DAO showed poor perfusion on 

her left leg with 0.7 and 0.9 in her right leg. She will need an arterial 

duplex of her lower extremities or CT angiogram of lower extremities as 

outpatient.





She was discharged to [facility] for rehab and continued care.  She 

will need to take Clindamycin 300 mg PO TID for 7 days. In addition, non-weight 

bearing on left foot. Follow-up with Dr. Jacob and PCP as outpatient.











- Discharge Data


Discharge Date: 11/05/19


Discharge Disposition: DC/Tfer to [facility] 03


Condition: Stable





- Referral to Home Health


Primary Care Physician: 


PCP Unobtainable








- Patient Summary/Data


Consults: 


 Consultations





11/01/19 17:33


Consult to Wound Care Services [CONS] Stat 





11/01/19 19:20


Consult to Physician [CONS] Routine 





11/04/19 12:00


Consult to Physician [CONS] Routine 














- Patient Instructions


Diet: Regular Diet as Tolerated


Activity: Non Weight Bearing


Activity, Other: No weightbearing on left foot until evaluated by podiatry 

outpatient.


Wound/Incision Care: Change Dressing Daily


Notify Provider of: Fever, Increased Pain, Swelling and Redness, Drainage, 

Nausea and/or Vomiting





- Discharge Plan


*PRESCRIPTION DRUG MONITORING PROGRAM REVIEWED*: No


*COPY OF PRESCRIPTION DRUG MONITORING REPORT IN PATIENT TG: No


Prescriptions/Med Rec: 


Clindamycin HCl 300 mg PO TID 7 Days #21 capsule


Home Medications: 


 Home Meds





Omeprazole Magnesium [Prilosec Otc] 20 mg PO ACBRK 12/30/14 [History]


Levothyroxine [Synthroid] 88 mcg PO ACBREAKFAST 07/28/16 [History]


Bisacodyl 10 mg RC DAILY PRN 11/01/19 [History]


Cetirizine HCl 10 mg PO BEDTIME 11/01/19 [History]


Loperamide HCl [Imodium A-D] 4 mg PO Q6H PRN 11/01/19 [History]


Oxybutynin Chloride 5 mg PO BID 11/01/19 [History]


Pedi Mv No.79/Ferrous Fumarate [Flintstones with Iron Tab Chew] 1 tab PO DAILY 

11/01/19 [History]


amLODIPine Besylate [Norvasc] 2.5 mg PO DAILY 11/01/19 [History]


Acetaminophen [Tylenol] 650 mg PO TID PRN 11/02/19 [History]


Acetaminophen with Codeine [Tylenol with Codeine #3 Tablet] 1 tab PO Q8H PRN 11/ 02/19 [History]


Docusate Sodium 100 mg PO Q12H 11/02/19 [History]


Patient's Own Medication [Ptom] 1 each PO BID 11/02/19 [History]


traMADol [Ultram] 100 mg PO Q8H PRN 11/02/19 [History]


Clindamycin HCl 300 mg PO TID 7 Days #21 capsule 11/05/19 [Rx]








Referrals: 


[facility] [Outside]


Escobar Noguera MD [Physician] - 


(next [place] rounds


)


Alex Jacob DPM [Physician] - 11/06/19 9:00 am





- Discharge Summary/Plan Comment


DC Time >30 min.: No





- Patient Data


Vitals - Most Recent: 


 Last Vital Signs











Temp  36.7 C   11/05/19 07:30


 


Pulse  101 H  11/05/19 07:30


 


Resp  18   11/05/19 07:30


 


BP  148/65 H  11/05/19 08:19


 


Pulse Ox  95   11/05/19 07:30











Weight - Most Recent: 48.988 kg


I&O - Last 24 hours: 


 Intake & Output











 11/04/19 11/05/19 11/05/19





 22:59 06:59 14:59


 


Intake Total 550 900 


 


Output Total 300 1600 


 


Balance 250 -700 











Lab Results - Last 24 hrs: 


 Laboratory Results - last 24 hr











  11/05/19 11/05/19 Range/Units





  05:05 05:05 


 


WBC  12.54 H   (4.0-11.0)  K/uL


 


RBC  3.80 L   (4.30-5.90)  M/uL


 


Hgb  11.2 L   (12.0-16.0)  g/dL


 


Hct  34.0 L   (36.0-46.0)  %


 


MCV  89.5   (80.0-98.0)  fL


 


MCH  29.5   (27.0-32.0)  pg


 


MCHC  32.9   (31.0-37.0)  g/dL


 


RDW Std Deviation  43.6   (28.0-62.0)  fl


 


RDW Coeff of Rai  13   (11.0-15.0)  %


 


Plt Count  286   (150-400)  K/uL


 


MPV  9.30   (7.40-12.00)  fL


 


Neut % (Auto)  72.0   (48.0-80.0)  %


 


Lymph % (Auto)  11.6 L   (16.0-40.0)  %


 


Mono % (Auto)  7.7   (0.0-15.0)  %


 


Eos % (Auto)  8.5 H   (0.0-7.0)  %


 


Baso % (Auto)  0.2   (0.0-1.5)  %


 


Neut # (Auto)  9.0 H   (1.4-5.7)  K/uL


 


Lymph # (Auto)  1.5   (0.6-2.4)  K/uL


 


Mono # (Auto)  1.0 H   (0.0-0.8)  K/uL


 


Eos # (Auto)  1.1 H   (0.0-0.7)  K/uL


 


Baso # (Auto)  0.0   (0.0-0.1)  K/uL


 


Nucleated RBC %  0.0   /100WBC


 


Nucleated RBCs #  0   K/uL


 


Sodium   138  (136-145)  mmol/L


 


Potassium   4.0  (3.5-5.1)  mmol/L


 


Chloride   103  ()  mmol/L


 


Carbon Dioxide   24.1  (21.0-32.0)  mmol/L


 


BUN   10  (7.0-18.0)  mg/dL


 


Creatinine   0.7  (0.6-1.0)  mg/dL


 


Est Cr Clr Drug Dosing   45.95  mL/min


 


Estimated GFR (MDRD)   > 60.0  ml/min


 


Glucose   81  ()  mg/dL


 


Calcium   8.5  (8.5-10.1)  mg/dL


 


Total Bilirubin   0.3  (0.2-1.0)  mg/dL


 


AST   17  (15-37)  IU/L


 


ALT   17  (14-63)  IU/L


 


Alkaline Phosphatase   127 H  ()  U/L


 


Total Protein   5.9 L  (6.4-8.2)  g/dL


 


Albumin   2.3 L  (3.4-5.0)  g/dL


 


Globulin   3.6  (2.6-4.0)  g/dL


 


Albumin/Globulin Ratio   0.6 L  (0.9-1.6)  











[facility] Results - Last 24 hrs: 


 Microbiology











 11/01/19 16:16 Aerobic Blood Culture - Preliminary





 Blood - Venous - Lab Draw    NO GROWTH AFTER 3 DAYS





 Anaerobic Blood Culture - Final


 


 11/01/19 16:05 Aerobic Blood Culture - Preliminary





 Blood - Venous    NO GROWTH AFTER 3 DAYS





 Anaerobic Blood Culture - Final











Med Orders - Current: 


 Current Medications





Acetaminophen (Tylenol)  650 mg PO Q4H PRN


   PRN Reason: Pain (Mild 1-3)/fever


   Last Admin: 11/05/19 08:20 Dose:  650 mg


Amlodipine Besylate (Norvasc)  2.5 mg PO DAILY [facility]


   Last Admin: 11/05/19 08:19 Dose:  2.5 mg


Bisacodyl (Dulcolax)  10 mg PO DAILY PRN


   PRN Reason: Constipation


Enoxaparin Sodium (Lovenox)  40 mg SUBCUT Q24H [facility]


   Last Admin: 11/04/19 18:45 Dose:  40 mg


Vancomycin HCl 1.25 gm/ Sodium (Chloride)  250 mls @ 166.667 mls/hr IV Q24H [facility]


   Last Infusion: 11/04/19 18:57 Dose:  100 mls/hr


Meropenem/Sodium Chloride 1 gm (/ Premix)  50 mls @ 100 mls/hr IV Q12H [facility]


   Last Admin: 11/05/19 05:00 Dose:  100 mls/hr


Ketorolac Tromethamine (Toradol)  15 mg IVPUSH Q6H PRN


   PRN Reason: Pain


   Last Admin: 11/05/19 04:15 Dose:  15 mg


Levothyroxine Sodium (Synthroid)  88 mcg PO ACBREAKFAST [facility]


   Last Admin: 11/05/19 08:19 Dose:  88 mcg


Lorazepam (Ativan)  1 mg PO ONETIME PRN


   PRN Reason: Anxiety


   Last Admin: 11/04/19 07:41 Dose:  1 mg


Omeprazole (Omeprazole)  20 mg PO BEDTIME [facility]


   Last Admin: 11/04/19 21:01 Dose:  20 mg


Polyethylene Glycol (Miralax)  17 gm PO BID PRN


   PRN Reason: Constipation


Promethazine HCl (Phenergan)  12.5 - 25 mg PO Q6H PRN


   PRN Reason: Nausea/Vomiting


   Last Admin: 11/05/19 00:18 Dose:  12.5 mg


Sodium Chloride (Saline Flush)  10 ml FLUSH ASDIRECTED PRN


   PRN Reason: Keep Vein Open


   Last Admin: 11/01/19 17:26 Dose:  10 ml


Sodium Chloride (Saline Flush)  2.5 ml FLUSH ASDIRECTED PRN


   PRN Reason: Keep Vein Open


   Last Admin: 11/04/19 17:07 Dose:  2.5 ml


Tramadol HCl (Ultram)  100 mg PO Q8H PRN


   PRN Reason: Pain


   Last Admin: 11/05/19 08:20 Dose:  100 mg


Vancomycin HCl (Pharmacy To Dose - Vancomycin)  1 dose .XX ASDIRECTED [facility]





Discontinued Medications





Amlodipine Besylate (Norvasc)  10 mg PO DAILY [facility]


   Last Admin: 11/02/19 08:44 Dose:  Not Given


Heparin Sodium (Porcine) (Heparin Sodium)  5,000 units SUBCUT Q8H [facility]


   Last Admin: 11/01/19 19:58 Dose:  Not Given


Hydromorphone HCl (Dilaudid)  0.5 mg IVPUSH ONETIME ONE


   Stop: 11/01/19 17:23


   Last Admin: 11/01/19 17:28 Dose:  0.5 mg


Hydromorphone HCl (Dilaudid)  0.5 mg IVPUSH Q3H PRN


   PRN Reason: Pain


   Last Admin: 11/04/19 08:06 Dose:  0.5 mg


Vancomycin HCl 1 gm/ Sodium (Chloride)  250 mls @ 166 mls/hr IV ONETIME ONE


   Stop: 11/01/19 18:13


   Last Admin: 11/01/19 17:25 Dose:  166 mls/hr


Sodium Chloride (Normal Saline)  1,000 mls @ 75 mls/hr IV ASDIRECTED [facility]


   Last Admin: 11/03/19 04:08 Dose:  75 mls/hr


Meropenem 1 gm/ Sodium (Chloride)  100 mls @ 200 mls/hr IV Q8H [facility]


   Last Admin: 11/01/19 19:59 Dose:  Not Given


Meropenem 1 gm/ Sodium (Chloride)  100 mls @ 200 mls/hr IV Q8H [facility]


   Last Admin: 11/02/19 04:03 Dose:  200 mls/hr


Vancomycin HCl 0.75 gm/ Sodium (Chloride)  250 mls @ 166.667 mls/hr IV Q24H [facility]


   Last Admin: 11/03/19 17:21 Dose:  Not Given


Meropenem/Sodium Chloride 1 gm (/ Premix)  50 mls @ 100 mls/hr IV Q8H [facility]


   Last Admin: 11/02/19 11:06 Dose:  Not Given


Meropenem/Sodium Chloride 1 gm (/ Premix)  50 mls @ 100 mls/hr IV Q8H [facility]


   Last Admin: 11/04/19 04:40 Dose:  100 mls/hr


Ketorolac Tromethamine (Toradol)  15 mg IVPUSH ONETIME ONE


   Stop: 11/01/19 17:21


   Last Admin: 11/01/19 17:28 Dose:  15 mg


Morphine Sulfate (Morphine)  1 mg IVPUSH Q2H PRN


   PRN Reason: Pain


Ondansetron HCl (Zofran)  4 mg IVPUSH ONETIME ONE


   Stop: 11/01/19 17:23


   Last Admin: 11/01/19 17:28 Dose:  4 mg


Ondansetron HCl (Zofran)  4 mg IVPUSH ONETIME ONE


   Stop: 11/01/19 17:25


   Last Admin: 11/01/19 17:35 Dose:  Not Given


Promethazine HCl (Phenergan)  25 mg PO Q6H PRN


   PRN Reason: Nausea/Vomiting


   Last Admin: 11/02/19 07:00 Dose:  25 mg


Promethazine HCl (Phenergan)  12.5 mg PO Q6H PRN


   PRN Reason: Nausea/Vomiting


   Last Admin: 11/03/19 07:23 Dose:  12.5 mg











<Jairon Miller J - Last Filed: 11/07/19 15:47>





**Discharge Summary





- Referral to Home Health


Primary Care Physician: 


PCP Unobtainable








- Patient Summary/Data


Consults: 


 Consultations





11/01/19 17:33


Consult to Wound Care Services [CONS] Stat 





11/01/19 19:20


Consult to Physician [CONS] Routine 





11/04/19 12:00


Consult to Physician [CONS] Routine 














- Patient Data


Vitals - Most Recent: 


 Last Vital Signs











Temp  36.8 C   11/05/19 13:00


 


Pulse  98   11/05/19 13:00


 


Resp  16   11/05/19 13:00


 


BP  138/75   11/05/19 13:00


 


Pulse Ox  96   11/05/19 13:00











[facility] Results - Last 24 hrs: 


 Microbiology











 11/01/19 16:16 Aerobic Blood Culture - Final





 Blood - Venous - Lab Draw    NO GROWTH AFTER 5 DAYS





 Anaerobic Blood Culture - Final


 


 11/01/19 16:05 Aerobic Blood Culture - Final





 Blood - Venous    NO GROWTH AFTER 5 DAYS





 Anaerobic Blood Culture - Final











Med Orders - Current: 


 Current Medications








Discontinued Medications





Acetaminophen (Tylenol)  650 mg PO Q4H PRN


   PRN Reason: Pain (Mild 1-3)/fever


   Last Admin: 11/05/19 13:08 Dose:  650 mg


Amlodipine Besylate (Norvasc)  10 mg PO DAILY [facility]


   Last Admin: 11/02/19 08:44 Dose:  Not Given


Amlodipine Besylate (Norvasc)  2.5 mg PO DAILY [facility]


   Last Admin: 11/05/19 08:19 Dose:  2.5 mg


Bisacodyl (Dulcolax)  10 mg PO DAILY PRN


   PRN Reason: Constipation


Enoxaparin Sodium (Lovenox)  40 mg SUBCUT Q24H [facility]


   Last Admin: 11/04/19 18:45 Dose:  40 mg


Heparin Sodium (Porcine) (Heparin Sodium)  5,000 units SUBCUT Q8H [facility]


   Last Admin: 11/01/19 19:58 Dose:  Not Given


Hydromorphone HCl (Dilaudid)  0.5 mg IVPUSH ONETIME ONE


   Stop: 11/01/19 17:23


   Last Admin: 11/01/19 17:28 Dose:  0.5 mg


Hydromorphone HCl (Dilaudid)  0.5 mg IVPUSH Q3H PRN


   PRN Reason: Pain


   Last Admin: 11/04/19 08:06 Dose:  0.5 mg


Vancomycin HCl 1 gm/ Sodium (Chloride)  250 mls @ 166 mls/hr IV ONETIME ONE


   Stop: 11/01/19 18:13


   Last Admin: 11/01/19 17:25 Dose:  166 mls/hr


Sodium Chloride (Normal Saline)  1,000 mls @ 75 mls/hr IV ASDIRECTED [facility]


   Last Admin: 11/03/19 04:08 Dose:  75 mls/hr


Meropenem 1 gm/ Sodium (Chloride)  100 mls @ 200 mls/hr IV Q8H [facility]


   Last Admin: 11/01/19 19:59 Dose:  Not Given


Meropenem 1 gm/ Sodium (Chloride)  100 mls @ 200 mls/hr IV Q8H [facility]


   Last Admin: 11/02/19 04:03 Dose:  200 mls/hr


Vancomycin HCl 0.75 gm/ Sodium (Chloride)  250 mls @ 166.667 mls/hr IV Q24H [facility]


   Last Admin: 11/03/19 17:21 Dose:  Not Given


Meropenem/Sodium Chloride 1 gm (/ Premix)  50 mls @ 100 mls/hr IV Q8H [facility]


   Last Admin: 11/02/19 11:06 Dose:  Not Given


Meropenem/Sodium Chloride 1 gm (/ Premix)  50 mls @ 100 mls/hr IV Q8H [facility]


   Last Admin: 11/04/19 04:40 Dose:  100 mls/hr


Vancomycin HCl 1.25 gm/ Sodium (Chloride)  250 mls @ 166.667 mls/hr IV Q24H [facility]


   Last Infusion: 11/04/19 18:57 Dose:  100 mls/hr


Meropenem/Sodium Chloride 1 gm (/ Premix)  50 mls @ 100 mls/hr IV Q12H [facility]


   Last Admin: 11/05/19 05:00 Dose:  100 mls/hr


Ketorolac Tromethamine (Toradol)  15 mg IVPUSH ONETIME ONE


   Stop: 11/01/19 17:21


   Last Admin: 11/01/19 17:28 Dose:  15 mg


Ketorolac Tromethamine (Toradol)  15 mg IVPUSH Q6H PRN


   PRN Reason: Pain


   Last Admin: 11/05/19 04:15 Dose:  15 mg


Levothyroxine Sodium (Synthroid)  88 mcg PO ACBREAKFAST [facility]


   Last Admin: 11/05/19 08:19 Dose:  88 mcg


Loperamide HCl (Imodium)  2 mg PO Q6H PRN


   PRN Reason: Diarrhea


   Last Admin: 11/05/19 13:28 Dose:  2 mg


Lorazepam (Ativan)  1 mg PO ONETIME PRN


   PRN Reason: Anxiety


   Last Admin: 11/04/19 07:41 Dose:  1 mg


Morphine Sulfate (Morphine)  1 mg IVPUSH Q2H PRN


   PRN Reason: Pain


Omeprazole (Omeprazole)  20 mg PO BEDTIME [facility]


   Last Admin: 11/04/19 21:01 Dose:  20 mg


Ondansetron HCl (Zofran)  4 mg IVPUSH ONETIME ONE


   Stop: 11/01/19 17:23


   Last Admin: 11/01/19 17:28 Dose:  4 mg


Ondansetron HCl (Zofran)  4 mg IVPUSH ONETIME ONE


   Stop: 11/01/19 17:25


   Last Admin: 11/01/19 17:35 Dose:  Not Given


Polyethylene Glycol (Miralax)  17 gm PO BID PRN


   PRN Reason: Constipation


Promethazine HCl (Phenergan)  25 mg PO Q6H PRN


   PRN Reason: Nausea/Vomiting


   Last Admin: 11/02/19 07:00 Dose:  25 mg


Promethazine HCl (Phenergan)  12.5 mg PO Q6H PRN


   PRN Reason: Nausea/Vomiting


   Last Admin: 11/03/19 07:23 Dose:  12.5 mg


Promethazine HCl (Phenergan)  12.5 - 25 mg PO Q6H PRN


   PRN Reason: Nausea/Vomiting


   Last Admin: 11/05/19 13:54 Dose:  12.5 mg


Sodium Chloride (Saline Flush)  10 ml FLUSH ASDIRECTED PRN


   PRN Reason: Keep Vein Open


   Last Admin: 11/01/19 17:26 Dose:  10 ml


Sodium Chloride (Saline Flush)  2.5 ml FLUSH ASDIRECTED PRN


   PRN Reason: Keep Vein Open


   Last Admin: 11/04/19 17:07 Dose:  2.5 ml


Tramadol HCl (Ultram)  100 mg PO Q8H PRN


   PRN Reason: Pain


   Last Admin: 11/05/19 08:20 Dose:  100 mg


Vancomycin HCl (Pharmacy To Dose - Vancomycin)  1 dose .XX ASDIRECTED HANSA











- Free Text/Narrative


Note: 





I have seen and evaluated the patient with the resident. I have discussed 

findings and treatment plan with the resident.  I agree with the assessment and 

plan outlined in the following note.

## 2019-12-13 ENCOUNTER — HOSPITAL ENCOUNTER (INPATIENT)
Dept: HOSPITAL 56 - MW.ED | Age: 84
LOS: 3 days | Discharge: NURSING, REHAB OR LONG-TERM CARE | DRG: 871 | End: 2019-12-16
Attending: INTERNAL MEDICINE | Admitting: INTERNAL MEDICINE
Payer: MEDICARE

## 2019-12-13 DIAGNOSIS — A41.9: Primary | ICD-10-CM

## 2019-12-13 DIAGNOSIS — M19.90: ICD-10-CM

## 2019-12-13 DIAGNOSIS — Z88.2: ICD-10-CM

## 2019-12-13 DIAGNOSIS — Z98.49: ICD-10-CM

## 2019-12-13 DIAGNOSIS — Z88.8: ICD-10-CM

## 2019-12-13 DIAGNOSIS — Z87.440: ICD-10-CM

## 2019-12-13 DIAGNOSIS — J18.9: ICD-10-CM

## 2019-12-13 DIAGNOSIS — L89.620: ICD-10-CM

## 2019-12-13 DIAGNOSIS — Z88.5: ICD-10-CM

## 2019-12-13 DIAGNOSIS — Z90.710: ICD-10-CM

## 2019-12-13 DIAGNOSIS — N32.81: ICD-10-CM

## 2019-12-13 DIAGNOSIS — R00.0: ICD-10-CM

## 2019-12-13 DIAGNOSIS — M34.1: ICD-10-CM

## 2019-12-13 DIAGNOSIS — E86.0: ICD-10-CM

## 2019-12-13 DIAGNOSIS — Z96.659: ICD-10-CM

## 2019-12-13 DIAGNOSIS — G89.29: ICD-10-CM

## 2019-12-13 DIAGNOSIS — K21.9: ICD-10-CM

## 2019-12-13 DIAGNOSIS — L89.899: ICD-10-CM

## 2019-12-13 DIAGNOSIS — K56.600: ICD-10-CM

## 2019-12-13 DIAGNOSIS — K56.609: ICD-10-CM

## 2019-12-13 DIAGNOSIS — N39.0: ICD-10-CM

## 2019-12-13 DIAGNOSIS — Z79.890: ICD-10-CM

## 2019-12-13 DIAGNOSIS — Z79.899: ICD-10-CM

## 2019-12-13 DIAGNOSIS — E03.9: ICD-10-CM

## 2019-12-13 DIAGNOSIS — I10: ICD-10-CM

## 2019-12-13 DIAGNOSIS — Z88.0: ICD-10-CM

## 2019-12-13 DIAGNOSIS — I73.00: ICD-10-CM

## 2019-12-13 DIAGNOSIS — E83.42: ICD-10-CM

## 2019-12-13 DIAGNOSIS — I77.1: ICD-10-CM

## 2019-12-13 DIAGNOSIS — R79.89: ICD-10-CM

## 2019-12-13 DIAGNOSIS — K58.0: ICD-10-CM

## 2019-12-13 DIAGNOSIS — Z91.040: ICD-10-CM

## 2019-12-13 DIAGNOSIS — Z88.6: ICD-10-CM

## 2019-12-13 LAB
BUN SERPL-MCNC: 21 MG/DL (ref 7–18)
CHLORIDE SERPL-SCNC: 100 MMOL/L (ref 98–107)
CO2 SERPL-SCNC: 20.1 MMOL/L (ref 21–32)
GLUCOSE SERPL-MCNC: 98 MG/DL (ref 74–106)
LIPASE SERPL-CCNC: 67 U/L (ref 73–393)
POTASSIUM SERPL-SCNC: 4.7 MMOL/L (ref 3.5–5.1)
SODIUM SERPL-SCNC: 137 MMOL/L (ref 136–145)

## 2019-12-13 PROCEDURE — 85025 COMPLETE CBC W/AUTO DIFF WBC: CPT

## 2019-12-13 PROCEDURE — 87040 BLOOD CULTURE FOR BACTERIA: CPT

## 2019-12-13 PROCEDURE — 71260 CT THORAX DX C+: CPT

## 2019-12-13 PROCEDURE — 81001 URINALYSIS AUTO W/SCOPE: CPT

## 2019-12-13 PROCEDURE — 87186 SC STD MICRODIL/AGAR DIL: CPT

## 2019-12-13 PROCEDURE — 82150 ASSAY OF AMYLASE: CPT

## 2019-12-13 PROCEDURE — 96361 HYDRATE IV INFUSION ADD-ON: CPT

## 2019-12-13 PROCEDURE — 87086 URINE CULTURE/COLONY COUNT: CPT

## 2019-12-13 PROCEDURE — 93005 ELECTROCARDIOGRAM TRACING: CPT

## 2019-12-13 PROCEDURE — 36415 COLL VENOUS BLD VENIPUNCTURE: CPT

## 2019-12-13 PROCEDURE — 87088 URINE BACTERIA CULTURE: CPT

## 2019-12-13 PROCEDURE — 80053 COMPREHEN METABOLIC PANEL: CPT

## 2019-12-13 PROCEDURE — 74022 RADEX COMPL AQT ABD SERIES: CPT

## 2019-12-13 PROCEDURE — 74177 CT ABD & PELVIS W/CONTRAST: CPT

## 2019-12-13 PROCEDURE — 84484 ASSAY OF TROPONIN QUANT: CPT

## 2019-12-13 PROCEDURE — 99285 EMERGENCY DEPT VISIT HI MDM: CPT

## 2019-12-13 PROCEDURE — 96374 THER/PROPH/DIAG INJ IV PUSH: CPT

## 2019-12-13 PROCEDURE — C9113 INJ PANTOPRAZOLE SODIUM, VIA: HCPCS

## 2019-12-13 PROCEDURE — 83605 ASSAY OF LACTIC ACID: CPT

## 2019-12-13 PROCEDURE — 96375 TX/PRO/DX INJ NEW DRUG ADDON: CPT

## 2019-12-13 PROCEDURE — 82962 GLUCOSE BLOOD TEST: CPT

## 2019-12-13 PROCEDURE — 83690 ASSAY OF LIPASE: CPT

## 2019-12-13 NOTE — CT
INDICATION:



Chest pain and leukocytosis



TECHNIQUE:



CT chest was acquired with 75 cc Isovue 370 intravenous contrast.



COMPARISON:



None.



FINDINGS:



Lungs and pleural: No pleural effusion or pneumothorax. Mild ground-glass 

opacities in the lungs bilaterally with more dense consolidation in the 

right middle lobe and left lower lobe. 



Mediastinum: Thoracic aorta is normal in caliber with mild atherosclerotic 

calcification. No pericardial effusion small amount of fluid noted within 

the esophagus. Subcentimeter mediastinal lymph nodes. 



Chest wall: No masses. 



Bones: Advanced right glenohumeral osteoarthritis. 



IMPRESSION:



1. Bilateral ground-glass opacities with more dense consolidation right 

middle lobe and left lower lobe suggestive of pneumonia.



2. Fluid within the esophagus which can be seen in gastroesophageal reflux.



Please note that all CT scans at this facility use dose modulation, 

iterative reconstruction, and/or weight-based dosing when appropriate to 

reduce radiation dose to as low as reasonably achievable.



Dictated by Nilesh Giordano MD @ Dec 13 2019 10:31PM



Signed by Dr. Nilesh Giordano @ Dec 13 2019 10:50PM

## 2019-12-13 NOTE — EDM.PDOC
ED HPI GENERAL MEDICAL PROBLEM





- General


Stated Complaint: PAT CAME FROM [facility]


Time Seen by Provider: 12/13/19 20:03





- History of Present Illness


INITIAL COMMENTS - FREE TEXT/NARRATIVE: 





HISTORY AND PHYSICAL:





History of present illness:


The patient is an 84-year-old female who resides at [facility] and 

[facility] and who presents today with 6-7 days of nausea and vomiting 

mostly with food and also with liquids according to the daughter at bedside. 

The nursing staff were aware of this but as they did not feel like he was 

consistent and she was not vomiting everything and was taking some fluids they 

did not notify the on-call doctor. The daughter says she was made aware of this 

today and wanted evaluation and had her sent here. The patient does state that 

mostly she vomits up food and it is usually large amounts and she has not had 

diarrhea and has been having normal bowel movements without black or bloody 

stools and they are formed not loose. SHe says that her stools look like her 

normal stools there has been no change in character or color She vomits she 

says it is a large amount and it is usually dark in color but it is not bloody 

or black. The patient said that she did take a lot of ginger ale today and has 

not vomited it but she does feel very dry and her mouth is very dry. She does 

have an abdominal surgical history of a total hysterectomy appendectomy and 

bladder suspension but no bowel issues in the past. According to the history 

the patient has had vomiting with UTIs in the past and has had frequent UTIs. 

She currently denies any urinary complaints or flank pain. She has no chest 

pain shortness of breath fevers or upper respiratory symptoms. She says that 

she only feels abdominal discomfort because she has been vomiting so much and 

did not have abdominal pain or bloating prior to the vomiting.


The patient was admitted last month for issues with her foot with decubital 

ulcers and was treated as an inpatient and has followed up  with the podiatrist 

Dr. Jacob. According to the daughter the patient has a follow-up appointment 

with the vascular surgeon in [place], Dr. Angulo, to discuss possible 

revascularization of her left leg. The patient has had persistent pain at her 

left foot and is currently wearing a specialized boot to reduce pressure. 

According to the patient nursing has been doing dressing changes and infected a 

dressing change tonight and it looks at its baseline. Daughter says she has had 

issues in the past with pain management due to nausea vomiting and other 

complications and she is currently taking tramadol which the patient says does 

work for her pain management. Daughter also says that Toradol IV works as well. 

Daughter and patient are not as concerned about the foot at this time.


The patient has a history of hypertension hypothyroidism and decubital ulcers 

with cellulitis in the past according to the computer. Patient is awake alert 

and very interactive here in the ED and is able to offer her history and 

interact. The patient has not had any fevers and has no other GI history that 

she is aware of or the daughter is aware of.


The patient does tell me that she is very thirsty and hungry at this time and 

has not had a loss of appetite.


Review of systems: 


As per history of present illness and below otherwise all systems reviewed and 

negative.





Past medical history: 


As per history of present illness and as reviewed below otherwise 

noncontributory.





Surgical history: 


As per history of present illness and as reviewed below otherwise 

noncontributory.





Social history: 


No reported history of drug or alcohol abuse.





Family history: 


As per history of present illness and as reviewed below otherwise 

noncontributory.





Physical exam:


General: Developed well-nourished thin female who is nontoxic and speaking 

clearly and easily in the ED. Vital signs are noted by me


HEENT: Atraumatic, normocephalic, pupils reactive, negative for conjunctival 

pallor or scleral icterus, mucous membranes tacky, throat clear, neck supple, 

nontender, trachea midline.


Lungs: Clear to auscultation, breath sounds equal bilaterally, chest 

nontender.No work of  breathing wheezing or stridor


Heart: S1S2, regular and rhythm with occasional ectopic beats no overt murmur


Abdomen: Soft, nondistended, nontender. Bowel sounds are slightly hypoactive 

and there is no gradient tympany on percussion. There is no rebound or guarding 

and no specific area of tenderness on deep palpation. Negative for masses or 

hepatosplenomegaly. Negative for costovertebral tenderness.


Pelvis: Stable nontender.


Genitourinary: Deferred.


Rectal: Deferred.


Extremities: Atraumatic, negative for cords or calf pain. Neurovascular 

unremarkable. At the left foot a dressing is in place and there is chronic 

discoloration and skin changes of the left fourth toe and a decubital ulcer can 

be seen at the base of the left foot but I did not take down the full dressing 

to appreciate the heel injury as this dressing change was just performed today 

and patient and daughter feel it is at its baseline. There is no proximal ankle 

or left lower extremity redness swelling streaking or skin changes. There is 

discomfort with palpation of all the toes of the left foot and the remainder of 

the toes on the left foot have good color and sensation.


Neuro: Awake, alert, oriented. Cranial nerves II through XII unremarkable. 

Cerebellum unremarkable. Motor and sensory unremarkable throughout. Exam 

nonfocal.





Diagnostics:


EKG CBC CMP amylase lipase lactic acid UA with reflex abdominal x-rays with 

chest x-ray troponin x 2, blood cultures 2


CT scan of the chest, abdomen and pelvis


Therapeutics:


Gentle IV fluids Protonix Zofran low-dose Toradol, tramadol vancomycin 

meropenem Dilaudid





Despite the positive troponin the patient is not having any chest pain or 

shortness of breath. The patient and the daughter at bedside are aware of the 

positive troponin the kidney function tests and the WBC count that is elevated. 

They are aware that we need to proceed to do a CAT scan of the chest abdomen 

and pelvis to see if there is anything triggering the WBC count and we still 

await a urine sample. Patient and daughter are aware that we do not have a 

cardiologist present but as she is not having symptoms they seem to be more 

concerned, as I am, about the vomiting and the elevated white cell count. We 

will continue to monitor her symptoms and her testing results and they are 

aware that she will at a minimum needed admission here and depending on the 

hospitalist input and her concerns if any about the troponin, she may need 

transfer.





Please note that the lactic acid was ordered but the lab is having difficulty 

running the sample due to machine issues and they will not know when they could 

run the sample and obtain a result. We will not have this piece of information 

for our evaluation.





2300: Case was discussed with our hospitalist Dr. Mayo; she would like a 

repeat troponin to be performed and if it is holding steady or dropping she 

will accept the patient here otherwise she is requesting transfer. She would 

like me to give a dose of vancomycin and meropenem and is aware of the patient'

s allergies. I will discuss his conversation with the patient and daughter at 

bedside and ending that repeat troponin plan for admission here or transfer. We'

ll also give her more IV fluids to help with her tachycardia and dehydration. 

The patient is asking something more for pain for her foot. Daughter says she 

can take Dilaudid with Zofran so I will give her a small dose.





2315: We have been told by the lab that the lactic acid can now be run and we 

will obtain that test results and I will communicate that to the hospitalist


0012: Dr Mayo is aware of the second troponin and accepts the patient here 

for admission. The family has also been notified and we will plan on transfer 

to a bed. At this point lab is saying that they are trying to run the lactic 

acid but are unsure of those results but I will inform the hospitalist that 

they are attempting to do the test.


Critical care time excluding procedures:35min





Impression: 


Urosepsis, bilateral lower lobe pneumonia, partial small bowel obstruction


Asymptomatic elevated troponin


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


Definitive disposition and diagnosis as appropriate pending reevaluation and 

review of above.





- Related Data


 Allergies











Allergy/AdvReac Type Severity Reaction Status Date / Time


 


codeine Allergy  Vomiting Verified 12/13/19 20:23


 


latex Allergy  Rash Verified 12/13/19 20:23


 


morphine Allergy  Difficulty Verified 12/13/19 20:23





   Breathing  


 


oxycodone HCl [From Percocet] Allergy  Vomiting Verified 12/13/19 20:23


 


Penicillins Allergy  Anaphylactic Verified 12/13/19 20:23





   Shock  


 


Sulfa (Sulfonamide Allergy  Anaphylactic Verified 12/13/19 20:23





Antibiotics)   Shock  


 


all pain meds Allergy  Nausea Uncoded 11/01/19 15:30











Home Meds: 


 Home Meds





Omeprazole Magnesium [Prilosec Otc] 20 mg PO ACBRK 12/30/14 [History]


Levothyroxine [Synthroid] 88 mcg PO ACBREAKFAST 07/28/16 [History]


Bisacodyl 10 mg RC DAILY PRN 11/01/19 [History]


Cetirizine HCl 10 mg PO BEDTIME 11/01/19 [History]


Loperamide HCl [Imodium A-D] 4 mg PO Q6H PRN 11/01/19 [History]


Oxybutynin Chloride 5 mg PO BID 11/01/19 [History]


Pedi Mv No.79/Ferrous Fumarate [Flintstones with Iron Tab Chew] 1 tab PO DAILY 

11/01/19 [History]


amLODIPine Besylate [Norvasc] 2.5 mg PO DAILY 11/01/19 [History]


Acetaminophen [Tylenol] 650 mg PO TID PRN 11/02/19 [History]


Acetaminophen with Codeine [Tylenol with Codeine #3 Tablet] 1 tab PO Q8H PRN 11/ 02/19 [History]


Docusate Sodium 100 mg PO Q12H 11/02/19 [History]


Patient's Own Medication [Ptom] 1 each PO BID 11/02/19 [History]


traMADol [Ultram] 100 mg PO Q8H PRN 11/02/19 [History]


Calcium Carbonate 200 mg PO DAILY PRN 12/13/19 [History]


Nabumetone [Relafen Ds] 500 mg PO BID 12/13/19 [History]


Promethazine HCl 12.5 mg PO BID 12/13/19 [History]











Past Medical History





- Past Health History


Medical/Surgical History: Denies Medical/Surgical History


HEENT History: Reports: Cataract


Other HEENT History: has upper denture and lower partial


Cardiovascular History: Reports: None


Respiratory History: Reports: None


Gastrointestinal History: Reports: GERD, Irritable Bowel Syndrome, Other (See 

Below)


Other Gastrointestinal History: IBS-Diarrhea


Genitourinary History: Reports: Other (See Below)


Other Genitourinary History: overactive bladder


OB/GYN History: Reports: Pregnancy


Musculoskeletal History: Reports: Arthritis


Neurological History: Reports: Other (See Below)


Other Neuro History: hx of motion sickness


Psychiatric History: Reports: None


Endocrine/Metabolic History: Reports: Hypothyroidism


Hematologic History: Reports: None


Immunologic History: Reports: Other (See Below)


Other Immunologic History: CREST syndrome


Oncologic (Cancer) History: Reports: None


Dermatologic History: Reports: None, Other (See Below)


Other Dermatologic History: Raynaud's Syndrome





- Infectious Disease History


Infectious Disease History: Reports: Chicken Pox, Measles, Mumps





- Past Surgical History


Head Surgeries/Procedures: Reports: None


HEENT Surgical History: Reports: Cataract Surgery


Female  Surgical History: Reports: Hysterectomy


Musculoskeletal Surgical History: Reports: Knee Replacement, ORIF, Shoulder 

Surgery





Social & Family History





- Family History


Family Medical History: Noncontributory


HEENT: Reports: Cataract


Respiratory: Reports: COPD


Musculoskeletal: Reports: Arthritis


Psychiatric: Reports: Depression


Endocrine/Metabolic: Reports: Diabetes, type II





- Caffeine Use


Caffeine Use: Reports: None





ED ROS GENERAL





- Review of Systems


Review Of Systems: Comprehensive ROS is negative, except as noted in HPI.





ED EXAM, GENERAL





- Physical Exam


Exam: See Below (see dictation)





Course





- Vital Signs


Last Recorded V/S: 


 Last Vital Signs











Temp  36.4 C   12/13/19 23:23


 


Pulse  102 H  12/13/19 23:23


 


Resp  20   12/13/19 23:23


 


BP  116/43 L  12/13/19 23:23


 


Pulse Ox  97   12/13/19 23:23














- Orders/Labs/Meds


Orders: 


 Active Orders 24 hr











 Category Date Time Status


 


 Patient Status [ADT] Stat ADT  12/14/19 00:18 Ordered


 


 Blood Glucose Check, Bedside [RC] ONETIME Care  12/13/19 20:21 Active


 


 EKG Documentation Completion [RC] STAT Care  12/13/19 20:21 Active


 


 Oxygen Therapy, ED [RC] ASDIRECTED Care  12/13/19 20:21 Active


 


 Pulse Oximetry [RC] ASDIRECTED Care  12/13/19 20:21 Active


 


 CULTURE BLOOD [BC] Stat Lab  12/13/19 21:15 Results


 


 CULTURE BLOOD [BC] Stat Lab  12/13/19 21:30 Received


 


 CULTURE URINE [RM] Stat Lab  12/13/19 21:38 Received


 


 Sodium Chloride 0.9% [Normal Saline] 1,000 ml Med  12/13/19 20:30 Active





 IV ASDIRECTED   


 


 Sodium Chloride 0.9% [Saline Flush] Med  12/13/19 20:22 Active





 10 ml FLUSH ASDIRECTED PRN   


 


 Sodium Chloride 0.9% [Saline Flush] Med  12/13/19 20:22 Active





 2.5 ml FLUSH ASDIRECTED PRN   


 


 Vancomycin 1 gm Med  12/13/19 23:09 Active





 Sodium Chloride 0.9% [Normal Saline (AdvBag)] 250 ml   





 IV ONETIME   


 


 Blood Culture x2 Reflex Set [OM.PC] Stat Oth  12/13/19 20:59 Ordered


 


 Saline Lock Insert [OM.PC] Stat Oth  12/13/19 20:21 Ordered








 Medication Orders





Sodium Chloride (Normal Saline)  1,000 mls @ 83 mls/hr IV ASDIRECTED [facility]


   Last Admin: 12/13/19 20:39  Dose: 83 mls/hr


Vancomycin HCl 1 gm/ Sodium (Chloride)  250 mls @ 166 mls/hr IV ONETIME ONE


   Stop: 12/14/19 00:39


   Last Admin: 12/13/19 23:17  Dose: 166 mls/hr


Sodium Chloride (Saline Flush)  10 ml FLUSH ASDIRECTED PRN


   PRN Reason: Keep Vein Open


Sodium Chloride (Saline Flush)  2.5 ml FLUSH ASDIRECTED PRN


   PRN Reason: Keep Vein Open








Labs: 


 Laboratory Tests











  12/13/19 12/13/19 12/13/19 Range/Units





  20:36 20:36 20:40 


 


WBC  33.84 H    (4.0-11.0)  K/uL


 


RBC  3.89 L    (4.30-5.90)  M/uL


 


Hgb  11.8 L    (12.0-16.0)  g/dL


 


Hct  35.5 L    (36.0-46.0)  %


 


MCV  91.3    (80.0-98.0)  fL


 


MCH  30.3    (27.0-32.0)  pg


 


MCHC  33.2    (31.0-37.0)  g/dL


 


RDW Std Deviation  51.8    (28.0-62.0)  fl


 


RDW Coeff of Rai  15    (11.0-15.0)  %


 


Plt Count  375    (150-400)  K/uL


 


MPV  9.50    (7.40-12.00)  fL


 


Add Manual Diff  YES    


 


Neutrophils % (Manual)  87 H    (48.0-80.0)  %


 


Band Neutrophils %  7    %


 


Lymphocytes % (Manual)  3 L    (16.0-40.0)  %


 


Monocytes % (Manual)  2    (0.0-15.0)  %


 


Metamyelocytes %  1    %


 


Nucleated RBC %  0.0    /100WBC


 


Absolute Seg Neuts  29.4 H    (1.4-5.7)  


 


Band Neutrophils #  2.4    


 


Lymphocytes # (Manual)  1.0    (0.6-2.4)  


 


Monocytes # (Manual)  0.7    (0.0-0.8)  


 


Absolute Metamyelocyte  0.3    


 


Nucleated RBCs #  0    K/uL


 


Lactate     (0.20-2.00)  mmol/L


 


Sodium   137   (136-145)  mmol/L


 


Potassium   4.7   (3.5-5.1)  mmol/L


 


Chloride   100   ()  mmol/L


 


Carbon Dioxide   20.1 L   (21.0-32.0)  mmol/L


 


BUN   21 H   (7.0-18.0)  mg/dL


 


Creatinine   1.0   (0.6-1.0)  mg/dL


 


Est Cr Clr Drug Dosing   31.74   mL/min


 


Estimated GFR (MDRD)   53.0   ml/min


 


Glucose   98   ()  mg/dL


 


Calcium   9.6   (8.5-10.1)  mg/dL


 


Total Bilirubin   0.6   (0.2-1.0)  mg/dL


 


AST   18   (15-37)  IU/L


 


ALT   13 L   (14-63)  IU/L


 


Alkaline Phosphatase   113   ()  U/L


 


Troponin I    0.131 H*  (0.000-0.056)  ng/mL


 


Total Protein   6.4   (6.4-8.2)  g/dL


 


Albumin   2.8 L   (3.4-5.0)  g/dL


 


Globulin   3.6   (2.6-4.0)  g/dL


 


Albumin/Globulin Ratio   0.8 L   (0.9-1.6)  


 


Amylase   16 L   ()  U/L


 


Lipase   67 L   ()  U/L


 


Urine Color     


 


Urine Appearance     


 


Urine pH     (5.0-8.0)  


 


Ur Specific Gravity     (1.001-1.035)  


 


Urine Protein     (NEGATIVE)  mg/dL


 


Urine Glucose (UA)     (NEGATIVE)  mg/dL


 


Urine Ketones     (NEGATIVE)  mg/dL


 


Urine Occult Blood     (NEGATIVE)  


 


Urine Nitrite     (NEGATIVE)  


 


Urine Bilirubin     (NEGATIVE)  


 


Urine Urobilinogen     (<2.0)  EU/dL


 


Ur Leukocyte Esterase     (NEGATIVE)  


 


Urine RBC     (0-2/HPF)  


 


Urine WBC     (0-5/HPF)  


 


Ur Epithelial Cells     (NONE-FEW)  


 


Urine Bacteria     (NEGATIVE)  














  12/13/19 12/13/19 12/13/19 Range/Units





  21:38 23:13 23:13 


 


WBC     (4.0-11.0)  K/uL


 


RBC     (4.30-5.90)  M/uL


 


Hgb     (12.0-16.0)  g/dL


 


Hct     (36.0-46.0)  %


 


MCV     (80.0-98.0)  fL


 


MCH     (27.0-32.0)  pg


 


MCHC     (31.0-37.0)  g/dL


 


RDW Std Deviation     (28.0-62.0)  fl


 


RDW Coeff of Rai     (11.0-15.0)  %


 


Plt Count     (150-400)  K/uL


 


MPV     (7.40-12.00)  fL


 


Add Manual Diff     


 


Neutrophils % (Manual)     (48.0-80.0)  %


 


Band Neutrophils %     %


 


Lymphocytes % (Manual)     (16.0-40.0)  %


 


Monocytes % (Manual)     (0.0-15.0)  %


 


Metamyelocytes %     %


 


Nucleated RBC %     /100WBC


 


Absolute Seg Neuts     (1.4-5.7)  


 


Band Neutrophils #     


 


Lymphocytes # (Manual)     (0.6-2.4)  


 


Monocytes # (Manual)     (0.0-0.8)  


 


Absolute Metamyelocyte     


 


Nucleated RBCs #     K/uL


 


Lactate   1.1   (0.20-2.00)  mmol/L


 


Sodium     (136-145)  mmol/L


 


Potassium     (3.5-5.1)  mmol/L


 


Chloride     ()  mmol/L


 


Carbon Dioxide     (21.0-32.0)  mmol/L


 


BUN     (7.0-18.0)  mg/dL


 


Creatinine     (0.6-1.0)  mg/dL


 


Est Cr Clr Drug Dosing     mL/min


 


Estimated GFR (MDRD)     ml/min


 


Glucose     ()  mg/dL


 


Calcium     (8.5-10.1)  mg/dL


 


Total Bilirubin     (0.2-1.0)  mg/dL


 


AST     (15-37)  IU/L


 


ALT     (14-63)  IU/L


 


Alkaline Phosphatase     ()  U/L


 


Troponin I    0.155 H*  (0.000-0.056)  ng/mL


 


Total Protein     (6.4-8.2)  g/dL


 


Albumin     (3.4-5.0)  g/dL


 


Globulin     (2.6-4.0)  g/dL


 


Albumin/Globulin Ratio     (0.9-1.6)  


 


Amylase     ()  U/L


 


Lipase     ()  U/L


 


Urine Color  YELLOW    


 


Urine Appearance  SLT CLOUDY    


 


Urine pH  6.0    (5.0-8.0)  


 


Ur Specific Gravity  1.015    (1.001-1.035)  


 


Urine Protein  NEGATIVE    (NEGATIVE)  mg/dL


 


Urine Glucose (UA)  NEGATIVE    (NEGATIVE)  mg/dL


 


Urine Ketones  15 H    (NEGATIVE)  mg/dL


 


Urine Occult Blood  SMALL H    (NEGATIVE)  


 


Urine Nitrite  POSITIVE H    (NEGATIVE)  


 


Urine Bilirubin  NEGATIVE    (NEGATIVE)  


 


Urine Urobilinogen  0.2    (<2.0)  EU/dL


 


Ur Leukocyte Esterase  MODERATE H    (NEGATIVE)  


 


Urine RBC  2-4    (0-2/HPF)  


 


Urine WBC  45-53    (0-5/HPF)  


 


Ur Epithelial Cells  RARE    (NONE-FEW)  


 


Urine Bacteria  3+ H    (NEGATIVE)  











Meds: 


Medications











Generic Name Dose Route Start Last Admin





  Trade Name Tomiq  PRN Reason Stop Dose Admin


 


Sodium Chloride  1,000 mls @ 83 mls/hr  12/13/19 20:30  12/13/19 20:39





  Normal Saline  IV   83 mls/hr





  ASDIRECTED HANSA   Administration





     





     





     





     


 


Vancomycin HCl 1 gm/ Sodium  250 mls @ 166 mls/hr  12/13/19 23:09  12/13/19 23:

17





  Chloride  IV  12/14/19 00:39  166 mls/hr





  ONETIME ONE   Administration





     





     





     





     


 


Sodium Chloride  10 ml  12/13/19 20:22  





  Saline Flush  FLUSH   





  ASDIRECTED PRN   





  Keep Vein Open   





     





     





     


 


Sodium Chloride  2.5 ml  12/13/19 20:22  





  Saline Flush  FLUSH   





  ASDIRECTED PRN   





  Keep Vein Open   





     





     





     














Discontinued Medications














Generic Name Dose Route Start Last Admin





  Trade Name Meghana  PRN Reason Stop Dose Admin


 


Hydromorphone HCl  0.5 mg  12/13/19 23:16  12/13/19 23:22





  Dilaudid  IVPUSH  12/13/19 23:17  0.5 mg





  ONETIME ONE   Administration





     





     





     





     


 


Pantoprazole Sodium 80 mg/  20 mls @ 420 mls/hr  12/13/19 20:23  12/13/19 20:50





  Sodium Chloride  IVPUSH  12/13/19 20:25  Not Given





  ONETIME ONE   





     





     





     





     


 


Pantoprazole Sodium 80 mg/  20 mls @ 420 mls/hr  12/13/19 20:47  12/13/19 20:50





  Sodium Chloride  IVPUSH  12/13/19 20:49  420 mls/hr





  ONETIME ONE   Administration





     





     





     





     


 


Meropenem 1 gm/ Sodium  100 mls @ 200 mls/hr  12/13/19 23:08  





  Chloride  IV  12/13/19 23:37  





  ONETIME ONE   





     





     





     





     


 


Iopamidol  100 ml  12/13/19 21:31  12/13/19 22:05





  Isovue-370 (76%)  IVPUSH  12/13/19 21:32  100 ml





  ONETIME ONE   Administration





     





     





     





     


 


Ketorolac Tromethamine  15 mg  12/13/19 20:23  12/13/19 20:44





  Toradol  IVPUSH  12/13/19 20:24  15 mg





  ONETIME ONE   Administration





     





     





     





     


 


Ondansetron HCl  4 mg  12/13/19 20:23  12/13/19 20:43





  Zofran  IVPUSH  12/13/19 20:24  4 mg





  ONETIME ONE   Administration





     





     





     





     


 


Ondansetron HCl  4 mg  12/13/19 23:16  12/13/19 23:22





  Zofran  IVPUSH  12/13/19 23:17  4 mg





  ONETIME ONE   Administration





     





     





     





     


 


Tramadol HCl  50 mg  12/13/19 21:45  12/13/19 21:49





  Ultram  PO  12/13/19 21:46  50 mg





  ONETIME ONE   Administration





     





     





     





     














Departure





- Departure


Time of Disposition: 00:20


Disposition: Admitted As Inpatient 67


Condition: Good


Clinical Impression: 


 Vomiting, Small bowel obstruction, Elevated troponin, Dehydration





UTI (urinary tract infection)


Qualifiers:


 Urinary tract infection type: site unspecified Hematuria presence: without 

hematuria Qualified Code(s): N39.0 - Urinary tract infection, site not specified





Pneumonia


Qualifiers:


 Pneumonia type: due to unspecified organism Laterality: unspecified laterality 

Lung location: unspecified part of lung Qualified Code(s): J18.9 - Pneumonia, 

unspecified organism








- Discharge Information


Referrals: 


Susan Stiles DO [Primary Care Provider] - 





Sepsis Event Note





- Focused Exam


Vital Signs: 


 Vital Signs











  Temp Pulse Resp BP Pulse Ox


 


 12/13/19 23:23  36.4 C  102 H  20  116/43 L  97


 


 12/13/19 22:16  36.6 C  109 H  20  106/46 L  94 L


 


 12/13/19 20:16  36.4 C  98  20  119/50 L 











Date Exam was Performed: 12/14/19


Time Exam was Performed: 00:19





- My Orders


Last 24 Hours: 


My Active Orders





12/13/19 20:21


Blood Glucose Check, Bedside [RC] ONETIME 


EKG Documentation Completion [RC] STAT 


Oxygen Therapy, ED [RC] ASDIRECTED 


Pulse Oximetry [RC] ASDIRECTED 


Saline Lock Insert [OM.PC] Stat 





12/13/19 20:22


Sodium Chloride 0.9% [Saline Flush]   10 ml FLUSH ASDIRECTED PRN 


Sodium Chloride 0.9% [Saline Flush]   2.5 ml FLUSH ASDIRECTED PRN 





12/13/19 20:30


Sodium Chloride 0.9% [Normal Saline] 1,000 ml IV ASDIRECTED 





12/13/19 20:59


Blood Culture x2 Reflex Set [OM.PC] Stat 





12/13/19 21:15


CULTURE BLOOD [BC] Stat 





12/13/19 21:30


CULTURE BLOOD [BC] Stat 





12/13/19 21:38


CULTURE URINE [RM] Stat 





12/13/19 23:09


Vancomycin 1 gm   Sodium Chloride 0.9% [Normal Saline (AdvBag)] 250 ml IV 

ONETIME 





12/14/19 00:18


Patient Status [ADT] Stat 














- Assessment/Plan


Last 24 Hours: 


My Active Orders





12/13/19 20:21


Blood Glucose Check, Bedside [RC] ONETIME 


EKG Documentation Completion [RC] STAT 


Oxygen Therapy, ED [RC] ASDIRECTED 


Pulse Oximetry [RC] ASDIRECTED 


Saline Lock Insert [OM.PC] Stat 





12/13/19 20:22


Sodium Chloride 0.9% [Saline Flush]   10 ml FLUSH ASDIRECTED PRN 


Sodium Chloride 0.9% [Saline Flush]   2.5 ml FLUSH ASDIRECTED PRN 





12/13/19 20:30


Sodium Chloride 0.9% [Normal Saline] 1,000 ml IV ASDIRECTED 





12/13/19 20:59


Blood Culture x2 Reflex Set [OM.PC] Stat 





12/13/19 21:15


CULTURE BLOOD [BC] Stat 





12/13/19 21:30


CULTURE BLOOD [BC] Stat 





12/13/19 21:38


CULTURE URINE [RM] Stat 





12/13/19 23:09


Vancomycin 1 gm   Sodium Chloride 0.9% [Normal Saline (AdvBag)] 250 ml IV 

ONETIME 





12/14/19 00:18


Patient Status [ADT] Stat

## 2019-12-13 NOTE — CR
INDICATION: :



Abdominal pain



COMPARISON:



Chest single view from 09/01/2017.



FINDINGS:



Erect and supine films of the abdomen were combined with an erect film of 

the chest. 



In the abdomen, there is no sign of distention of the small bowel or colon 

to suggest obstruction or ileus. There is no sign of free air or distinct 

mass. 



There is mild scoliosis of the thoracolumbar spine convex towards the left. 

In the chest, there are new mild patchy bilateral lower lobe infiltrates, 

suggesting bilateral lower lobe pneumonia or possibly atelectasis. There is 

no definite pleural effusion. 



The heart and mediastinum remain normal in appearance. 



The previously seen left PICC line is no longer present.



Again seen is prominent superior subluxation of the right humeral head 

consistent with rotator cuff degeneration. 



IMPRESSION:



No sign of obstruction or ileus. 



New mild patchy bilateral lower lobe infiltrates, suggestive of bilateral 

lower lobe pneumonia.



Dictated by Bharath Morgan MD @ Dec 13 2019  9:16PM



Signed by Dr. Bharath Morgan @ Dec 13 2019  9:19PM

## 2019-12-13 NOTE — CT
INDICATION:



Abdominal pain, leukocytosis 



TECHNIQUE:



CT abdomen and pelvis acquired with 75 cc Isovue 370 IV contrast.



COMPARISON:



KUB from same day 



FINDINGS:



Lower chest: Patchy infiltrates in the right middle and left lower lobes. 



Liver: Unremarkable.  



Spleen: Unremarkable.  



Pancreas: Unremarkable.  



Gallbladder and bile ducts: Unremarkable.  



Adrenal glands: Unremarkable.  



Kidneys: Simple cyst right kidney. 



GI tract: Fluid-filled loops of small bowel in the pelvis measuring up to 

4.3 cm in diameter. No definite transition point identified. The colon is 

decompressed. 



Vascular structures: Moderate atherosclerotic disease. 



Lymph nodes: Unremarkable.  



Miscellaneous: Unremarkable.  No free air or significant free fluid.  



Pelvic Organs: Status post hysterectomy. 



Bones: Old L4 fracture. 



IMPRESSION:



Infiltrates in the right middle and left lower lobes likely represent 

pneumonia. 



Small bowel obstruction. No transition point identified. The colon is 

decompressed. 



Status post hysterectomy.



Please note that all CT scans at this facility use dose modulation, 

iterative reconstruction, and/or weight-based dosing when appropriate to 

reduce radiation dose to as low as reasonably achievable.



Dictated by Ermelinda Ramirez MD @ Dec 13 2019 10:27PM



Signed by Dr. Ermelinda Ramirez @ Dec 13 2019 10:27PM

## 2019-12-14 LAB
BUN SERPL-MCNC: 18 MG/DL (ref 7–18)
CHLORIDE SERPL-SCNC: 105 MMOL/L (ref 98–107)
CO2 SERPL-SCNC: 23.7 MMOL/L (ref 21–32)
GLUCOSE SERPL-MCNC: 74 MG/DL (ref 74–106)
POTASSIUM SERPL-SCNC: 3.9 MMOL/L (ref 3.5–5.1)
SODIUM SERPL-SCNC: 139 MMOL/L (ref 136–145)

## 2019-12-14 RX ADMIN — DEXTROSE SCH UNITS: 10 SOLUTION INTRAVENOUS at 16:17

## 2019-12-14 RX ADMIN — MEROPENEM AND SODIUM CHLORIDE SCH MLS/HR: 1 INJECTION, SOLUTION INTRAVENOUS at 09:58

## 2019-12-14 RX ADMIN — MEROPENEM AND SODIUM CHLORIDE SCH MLS/HR: 1 INJECTION, SOLUTION INTRAVENOUS at 16:23

## 2019-12-14 RX ADMIN — DEXTROSE SCH UNITS: 10 SOLUTION INTRAVENOUS at 09:31

## 2019-12-14 RX ADMIN — OMEPRAZOLE SCH MG: 20 CAPSULE, DELAYED RELEASE ORAL at 06:30

## 2019-12-14 NOTE — PCM.HP.2
<Edwardo Olmedo M - Last Filed: 12/14/19 11:33>





H&P History of Present Illness





- General


Date of Service: 12/14/19


Admit Problem/Dx: 


 Admission Diagnosis/Problem





Admission Diagnosis/Problem      Pneumonia








Source of Information: Patient


History Limitations: Reports: No Limitations





- History of Present Illness


Initial Comments - Free Text/Narative: 





84-year-old female presented to ER with complaints of nausea and vomiting for 

approximately 1 week. She has a PMH of HTN, hypothyroidism, CREST syndrome and 

left foot ulcer. Patient reports that she had been vomiting for the past week 

and it initially started when she took her norco pain medication for her left 

foot ulcer. She reports that every time she took her norco, she would start 

vomiting. She stopped taking the medication 1 week ago. However, since then she 

has been vomiting every time she tried to eat something. She has been unable to 

keep solid and liquids down. The vomit is non-bloody. She also reported having 

abdominal pain over the past few days because of all the vomiting. She reports 

having normal bowel movements. She las vomited 24 hours ago and has been able 

to keep liquids down over the past 1 day. Furthermore, patient reports being 

diagnosed with left foot ulcer approximately 1 month ago and is following up 

with a podiatrist for it and has an appointment with vascular surgeon in [place], [place] this coming Monday. Patient denies any fevers, chills, blurry vision, sore 

throat, cough, shortness of breath, chest pain, diarrhea, blood in urine, pain 

on urination, blood in stool, numbness or tingling in extremities.





In the ER, patient to have WBC count of 33, 000, CMP unremarkable, lactate 

normal and elevated troponin 0.155. Repeat troponin was 0.133 and patient 

denied ever having any chest pain or SOB. CXR showed bilateral LL infiltrates. 

CT abdomen showed small bowel obstruction. CT chest showed bilateral pneumonia. 

UA was nitrite and esterase positive with 3+ bacteria. Patient given IV NS bolus

, started on vancomycin and meropenem. Patient admitted for further evaluation.


  ** Left Feet


Pain Score (Numeric/FACES): 3





- Related Data


Allergies/Adverse Reactions: 


 Allergies











Allergy/AdvReac Type Severity Reaction Status Date / Time


 


codeine Allergy  Vomiting Verified 12/14/19 01:28


 


latex Allergy  Rash Verified 12/14/19 01:28


 


morphine Allergy  Difficulty Verified 12/14/19 01:28





   Breathing  


 


oxycodone HCl [From Percocet] Allergy  Vomiting Verified 12/14/19 01:28


 


Penicillins Allergy  Anaphylactic Verified 12/14/19 01:28





   Shock  


 


Sulfa (Sulfonamide Allergy  Anaphylactic Verified 12/14/19 01:28





Antibiotics)   Shock  


 


all pain meds Allergy  Nausea Uncoded 12/14/19 01:28











Home Medications: 


 Home Meds





Omeprazole Magnesium [Prilosec Otc] 20 mg PO ACBRK 12/30/14 [History]


Levothyroxine [Synthroid] 88 mcg PO ACBREAKFAST 07/28/16 [History]


Bisacodyl 10 mg RC DAILY PRN 11/01/19 [History]


Cetirizine HCl 10 mg PO BEDTIME 11/01/19 [History]


Loperamide HCl [Imodium A-D] 4 mg PO Q6H PRN 11/01/19 [History]


Oxybutynin Chloride 5 mg PO BID 11/01/19 [History]


Pedi Mv No.79/Ferrous Fumarate [Flintstones with Iron Tab Chew] 1 tab PO DAILY 

11/01/19 [History]


amLODIPine Besylate [Norvasc] 2.5 mg PO DAILY 11/01/19 [History]


Acetaminophen [Tylenol] 650 mg PO TID PRN 11/02/19 [History]


Acetaminophen with Codeine [Tylenol with Codeine #3 Tablet] 1 tab PO Q8H PRN 11/ 02/19 [History]


Docusate Sodium 100 mg PO Q12H 11/02/19 [History]


Patient's Own Medication [Ptom] 1 each PO BID 11/02/19 [History]


traMADol [Ultram] 100 mg PO Q8H PRN 11/02/19 [History]


Calcium Carbonate 200 mg PO DAILY PRN 12/13/19 [History]


Nabumetone [Relafen Ds] 500 mg PO BID 12/13/19 [History]


Promethazine HCl 12.5 mg PO BID 12/13/19 [History]











Past Medical History





- Past Health History


Medical/Surgical History: Denies Medical/Surgical History


HEENT History: Reports: Cataract


Other HEENT History: has upper denture and lower partial


Cardiovascular History: Reports: None


Respiratory History: Reports: None


Gastrointestinal History: Reports: GERD, Irritable Bowel Syndrome, Other (See 

Below)


Other Gastrointestinal History: IBS-Diarrhea


Genitourinary History: Reports: Other (See Below)


Other Genitourinary History: overactive bladder


OB/GYN History: Reports: Pregnancy


Musculoskeletal History: Reports: Arthritis


Neurological History: Reports: Other (See Below)


Other Neuro History: hx of motion sickness


Psychiatric History: Reports: None


Endocrine/Metabolic History: Reports: Hypothyroidism


Hematologic History: Reports: None


Immunologic History: Reports: Other (See Below)


Other Immunologic History: CREST syndrome


Oncologic (Cancer) History: Reports: None


Dermatologic History: Reports: None, Other (See Below)


Other Dermatologic History: Raynaud's Syndrome





- Infectious Disease History


Infectious Disease History: Reports: Chicken Pox, Measles, Mumps





- Past Surgical History


Head Surgeries/Procedures: Reports: None


HEENT Surgical History: Reports: Cataract Surgery


Female  Surgical History: Reports: Hysterectomy


Musculoskeletal Surgical History: Reports: Knee Replacement, ORIF, Shoulder 

Surgery





Social & Family History





- Family History


Family Medical History: Noncontributory


HEENT: Reports: Cataract


Respiratory: Reports: COPD


Musculoskeletal: Reports: Arthritis


Psychiatric: Reports: Depression


Endocrine/Metabolic: Reports: Diabetes, type II





- Tobacco Use


Smoking Status *Q: Never Smoker


Second Hand Smoke Exposure: No





- Caffeine Use


Caffeine Use: Reports: Coffee, Soda, Tea





- Recreational Drug Use


Recreational Drug Use: No





H&P Review of Systems





- Review of Systems:


Review Of Systems: Comprehensive ROS is negative, except as noted in HPI.





Exam





- Exam


Exam: See Below





- Vital Signs


Vital Signs: 


 Last Vital Signs











Temp  97.7 F   12/14/19 04:00


 


Pulse  102 H  12/14/19 04:00


 


Resp  16   12/14/19 04:00


 


BP  105/45 L  12/14/19 04:00


 


Pulse Ox  97   12/14/19 04:00











Weight: 45.495 kg





- Exam


General: Alert, Oriented, Cooperative, Other (NAD)


HEENT: Conjunctiva Clear, EOMI, Hearing Intact, Mucosa Moist & Pink, Posterior 

Pharynx Clear


Neck: Supple, Trachea Midline


Lungs: Normal Respiratory Effort, Other (decreased breath sounds in LLL)


Cardiovascular: Regular Rate, Regular Rhythm


GI/Abdominal Exam: Soft, Non-Tender, No Distention, Other (Hypoactive bowel 

sounds, no CVA tenderness, no flank tenderness)


Extremities: Other (Left foot 4th digit black discoloration. Foot dressings in 

place.)


Skin: Warm, Dry, Intact


Neurological: Cranial Nerves Intact, Strength Equal Bilateral, Normal Speech, 

Normal Tone


Neuro Extensive - Mental Status: Alert, Oriented x3, Normal Mood/Affect





- Patient Data


Lab Results Last 24 hrs: 


 Laboratory Results - last 24 hr











  12/13/19 12/13/19 12/13/19 Range/Units





  20:36 20:36 20:40 


 


WBC  33.84 H    (4.0-11.0)  K/uL


 


RBC  3.89 L    (4.30-5.90)  M/uL


 


Hgb  11.8 L    (12.0-16.0)  g/dL


 


Hct  35.5 L    (36.0-46.0)  %


 


MCV  91.3    (80.0-98.0)  fL


 


MCH  30.3    (27.0-32.0)  pg


 


MCHC  33.2    (31.0-37.0)  g/dL


 


RDW Std Deviation  51.8    (28.0-62.0)  fl


 


RDW Coeff of Rai  15    (11.0-15.0)  %


 


Plt Count  375    (150-400)  K/uL


 


MPV  9.50    (7.40-12.00)  fL


 


Neut % (Auto)     (48.0-80.0)  %


 


Lymph % (Auto)     (16.0-40.0)  %


 


Mono % (Auto)     (0.0-15.0)  %


 


Eos % (Auto)     (0.0-7.0)  %


 


Baso % (Auto)     (0.0-1.5)  %


 


Neut # (Auto)     (1.4-5.7)  K/uL


 


Lymph # (Auto)     (0.6-2.4)  K/uL


 


Mono # (Auto)     (0.0-0.8)  K/uL


 


Eos # (Auto)     (0.0-0.7)  K/uL


 


Baso # (Auto)     (0.0-0.1)  K/uL


 


Add Manual Diff  YES    


 


Neutrophils % (Manual)  87 H    (48.0-80.0)  %


 


Band Neutrophils %  7    %


 


Lymphocytes % (Manual)  3 L    (16.0-40.0)  %


 


Monocytes % (Manual)  2    (0.0-15.0)  %


 


Metamyelocytes %  1    %


 


Nucleated RBC %  0.0    /100WBC


 


Absolute Seg Neuts  29.4 H    (1.4-5.7)  


 


Band Neutrophils #  2.4    


 


Lymphocytes # (Manual)  1.0    (0.6-2.4)  


 


Monocytes # (Manual)  0.7    (0.0-0.8)  


 


Absolute Metamyelocyte  0.3    


 


Nucleated RBCs #  0    K/uL


 


Lactate     (0.20-2.00)  mmol/L


 


Sodium   137   (136-145)  mmol/L


 


Potassium   4.7   (3.5-5.1)  mmol/L


 


Chloride   100   ()  mmol/L


 


Carbon Dioxide   20.1 L   (21.0-32.0)  mmol/L


 


BUN   21 H   (7.0-18.0)  mg/dL


 


Creatinine   1.0   (0.6-1.0)  mg/dL


 


Est Cr Clr Drug Dosing   31.74   mL/min


 


Estimated GFR (MDRD)   53.0   ml/min


 


Glucose   98   ()  mg/dL


 


Calcium   9.6   (8.5-10.1)  mg/dL


 


Phosphorus     (2.6-4.7)  mg/dL


 


Magnesium     (1.8-2.4)  mg/dL


 


Total Bilirubin   0.6   (0.2-1.0)  mg/dL


 


AST   18   (15-37)  IU/L


 


ALT   13 L   (14-63)  IU/L


 


Alkaline Phosphatase   113   ()  U/L


 


Troponin I    0.131 H*  (0.000-0.056)  ng/mL


 


Total Protein   6.4   (6.4-8.2)  g/dL


 


Albumin   2.8 L   (3.4-5.0)  g/dL


 


Globulin   3.6   (2.6-4.0)  g/dL


 


Albumin/Globulin Ratio   0.8 L   (0.9-1.6)  


 


Amylase   16 L   ()  U/L


 


Lipase   67 L   ()  U/L


 


Urine Color     


 


Urine Appearance     


 


Urine pH     (5.0-8.0)  


 


Ur Specific Gravity     (1.001-1.035)  


 


Urine Protein     (NEGATIVE)  mg/dL


 


Urine Glucose (UA)     (NEGATIVE)  mg/dL


 


Urine Ketones     (NEGATIVE)  mg/dL


 


Urine Occult Blood     (NEGATIVE)  


 


Urine Nitrite     (NEGATIVE)  


 


Urine Bilirubin     (NEGATIVE)  


 


Urine Urobilinogen     (<2.0)  EU/dL


 


Ur Leukocyte Esterase     (NEGATIVE)  


 


Urine RBC     (0-2/HPF)  


 


Urine WBC     (0-5/HPF)  


 


Ur Epithelial Cells     (NONE-FEW)  


 


Urine Bacteria     (NEGATIVE)  














  12/13/19 12/13/19 12/13/19 Range/Units





  21:38 23:13 23:13 


 


WBC     (4.0-11.0)  K/uL


 


RBC     (4.30-5.90)  M/uL


 


Hgb     (12.0-16.0)  g/dL


 


Hct     (36.0-46.0)  %


 


MCV     (80.0-98.0)  fL


 


MCH     (27.0-32.0)  pg


 


MCHC     (31.0-37.0)  g/dL


 


RDW Std Deviation     (28.0-62.0)  fl


 


RDW Coeff of Rai     (11.0-15.0)  %


 


Plt Count     (150-400)  K/uL


 


MPV     (7.40-12.00)  fL


 


Neut % (Auto)     (48.0-80.0)  %


 


Lymph % (Auto)     (16.0-40.0)  %


 


Mono % (Auto)     (0.0-15.0)  %


 


Eos % (Auto)     (0.0-7.0)  %


 


Baso % (Auto)     (0.0-1.5)  %


 


Neut # (Auto)     (1.4-5.7)  K/uL


 


Lymph # (Auto)     (0.6-2.4)  K/uL


 


Mono # (Auto)     (0.0-0.8)  K/uL


 


Eos # (Auto)     (0.0-0.7)  K/uL


 


Baso # (Auto)     (0.0-0.1)  K/uL


 


Add Manual Diff     


 


Neutrophils % (Manual)     (48.0-80.0)  %


 


Band Neutrophils %     %


 


Lymphocytes % (Manual)     (16.0-40.0)  %


 


Monocytes % (Manual)     (0.0-15.0)  %


 


Metamyelocytes %     %


 


Nucleated RBC %     /100WBC


 


Absolute Seg Neuts     (1.4-5.7)  


 


Band Neutrophils #     


 


Lymphocytes # (Manual)     (0.6-2.4)  


 


Monocytes # (Manual)     (0.0-0.8)  


 


Absolute Metamyelocyte     


 


Nucleated RBCs #     K/uL


 


Lactate   1.1   (0.20-2.00)  mmol/L


 


Sodium     (136-145)  mmol/L


 


Potassium     (3.5-5.1)  mmol/L


 


Chloride     ()  mmol/L


 


Carbon Dioxide     (21.0-32.0)  mmol/L


 


BUN     (7.0-18.0)  mg/dL


 


Creatinine     (0.6-1.0)  mg/dL


 


Est Cr Clr Drug Dosing     mL/min


 


Estimated GFR (MDRD)     ml/min


 


Glucose     ()  mg/dL


 


Calcium     (8.5-10.1)  mg/dL


 


Phosphorus     (2.6-4.7)  mg/dL


 


Magnesium     (1.8-2.4)  mg/dL


 


Total Bilirubin     (0.2-1.0)  mg/dL


 


AST     (15-37)  IU/L


 


ALT     (14-63)  IU/L


 


Alkaline Phosphatase     ()  U/L


 


Troponin I    0.155 H*  (0.000-0.056)  ng/mL


 


Total Protein     (6.4-8.2)  g/dL


 


Albumin     (3.4-5.0)  g/dL


 


Globulin     (2.6-4.0)  g/dL


 


Albumin/Globulin Ratio     (0.9-1.6)  


 


Amylase     ()  U/L


 


Lipase     ()  U/L


 


Urine Color  YELLOW    


 


Urine Appearance  SLT CLOUDY    


 


Urine pH  6.0    (5.0-8.0)  


 


Ur Specific Gravity  1.015    (1.001-1.035)  


 


Urine Protein  NEGATIVE    (NEGATIVE)  mg/dL


 


Urine Glucose (UA)  NEGATIVE    (NEGATIVE)  mg/dL


 


Urine Ketones  15 H    (NEGATIVE)  mg/dL


 


Urine Occult Blood  SMALL H    (NEGATIVE)  


 


Urine Nitrite  POSITIVE H    (NEGATIVE)  


 


Urine Bilirubin  NEGATIVE    (NEGATIVE)  


 


Urine Urobilinogen  0.2    (<2.0)  EU/dL


 


Ur Leukocyte Esterase  MODERATE H    (NEGATIVE)  


 


Urine RBC  2-4    (0-2/HPF)  


 


Urine WBC  45-53    (0-5/HPF)  


 


Ur Epithelial Cells  RARE    (NONE-FEW)  


 


Urine Bacteria  3+ H    (NEGATIVE)  














  12/14/19 12/14/19 12/14/19 Range/Units





  06:10 06:10 06:10 


 


WBC   23.63 H   (4.0-11.0)  K/uL


 


RBC   3.34 L   (4.30-5.90)  M/uL


 


Hgb   10.0 L   (12.0-16.0)  g/dL


 


Hct   31.0 L   (36.0-46.0)  %


 


MCV   92.8   (80.0-98.0)  fL


 


MCH   29.9   (27.0-32.0)  pg


 


MCHC   32.3   (31.0-37.0)  g/dL


 


RDW Std Deviation   53.4   (28.0-62.0)  fl


 


RDW Coeff of Rai   16 H   (11.0-15.0)  %


 


Plt Count   333   (150-400)  K/uL


 


MPV   9.80   (7.40-12.00)  fL


 


Neut % (Auto)   85.7 H   (48.0-80.0)  %


 


Lymph % (Auto)   8.3 L   (16.0-40.0)  %


 


Mono % (Auto)   4.4   (0.0-15.0)  %


 


Eos % (Auto)   1.3   (0.0-7.0)  %


 


Baso % (Auto)   0.3   (0.0-1.5)  %


 


Neut # (Auto)   20.3 H   (1.4-5.7)  K/uL


 


Lymph # (Auto)   2.0   (0.6-2.4)  K/uL


 


Mono # (Auto)   1.0 H   (0.0-0.8)  K/uL


 


Eos # (Auto)   0.3   (0.0-0.7)  K/uL


 


Baso # (Auto)   0.1   (0.0-0.1)  K/uL


 


Add Manual Diff     


 


Neutrophils % (Manual)     (48.0-80.0)  %


 


Band Neutrophils %     %


 


Lymphocytes % (Manual)     (16.0-40.0)  %


 


Monocytes % (Manual)     (0.0-15.0)  %


 


Metamyelocytes %     %


 


Nucleated RBC %   0.0   /100WBC


 


Absolute Seg Neuts     (1.4-5.7)  


 


Band Neutrophils #     


 


Lymphocytes # (Manual)     (0.6-2.4)  


 


Monocytes # (Manual)     (0.0-0.8)  


 


Absolute Metamyelocyte     


 


Nucleated RBCs #   0   K/uL


 


Lactate     (0.20-2.00)  mmol/L


 


Sodium    139  (136-145)  mmol/L


 


Potassium    3.9  (3.5-5.1)  mmol/L


 


Chloride    105  ()  mmol/L


 


Carbon Dioxide    23.7  (21.0-32.0)  mmol/L


 


BUN    18  (7.0-18.0)  mg/dL


 


Creatinine    0.9  (0.6-1.0)  mg/dL


 


Est Cr Clr Drug Dosing    34.02  mL/min


 


Estimated GFR (MDRD)    59.8  ml/min


 


Glucose    74  ()  mg/dL


 


Calcium    8.3 L  (8.5-10.1)  mg/dL


 


Phosphorus    2.7  (2.6-4.7)  mg/dL


 


Magnesium    1.2 L  (1.8-2.4)  mg/dL


 


Total Bilirubin     (0.2-1.0)  mg/dL


 


AST     (15-37)  IU/L


 


ALT     (14-63)  IU/L


 


Alkaline Phosphatase     ()  U/L


 


Troponin I  0.133 H*    (0.000-0.056)  ng/mL


 


Total Protein     (6.4-8.2)  g/dL


 


Albumin     (3.4-5.0)  g/dL


 


Globulin     (2.6-4.0)  g/dL


 


Albumin/Globulin Ratio     (0.9-1.6)  


 


Amylase     ()  U/L


 


Lipase     ()  U/L


 


Urine Color     


 


Urine Appearance     


 


Urine pH     (5.0-8.0)  


 


Ur Specific Gravity     (1.001-1.035)  


 


Urine Protein     (NEGATIVE)  mg/dL


 


Urine Glucose (UA)     (NEGATIVE)  mg/dL


 


Urine Ketones     (NEGATIVE)  mg/dL


 


Urine Occult Blood     (NEGATIVE)  


 


Urine Nitrite     (NEGATIVE)  


 


Urine Bilirubin     (NEGATIVE)  


 


Urine Urobilinogen     (<2.0)  EU/dL


 


Ur Leukocyte Esterase     (NEGATIVE)  


 


Urine RBC     (0-2/HPF)  


 


Urine WBC     (0-5/HPF)  


 


Ur Epithelial Cells     (NONE-FEW)  


 


Urine Bacteria     (NEGATIVE)  











Result Diagrams: 


 12/14/19 06:10





 12/14/19 06:10


Jose J Results Last 24 hrs: 


 Microbiology











 12/13/19 21:15 Anaerobic Blood Culture - Final





 Blood - Venous 














Sepsis Event Note





- Evaluation


Sepsis Screening Result: Sepsis Risk





- Focused Exam


Vital Signs: 


 Vital Signs











  Temp Pulse Resp BP Pulse Ox Pulse Ox


 


 12/14/19 04:00  97.7 F  102 H  16  105/45 L  97 


 


 12/14/19 01:00  98.5 F  109 H  16  121/56 L  94 L  94 L


 


 12/14/19 00:37  97.8 F  109 H  18  115/39 L  93 L 


 


 12/13/19 23:23  97.5 F  102 H  20  116/43 L  97 


 


 12/13/19 22:16  97.9 F  109 H  20  106/46 L  94 L 











Date Exam was Performed: 12/14/19


Time Exam was Performed: 11:33


Problem List Initiated/Reviewed/Updated: Yes


Orders Last 24hrs: 


 Active Orders 24 hr











 Category Date Time Status


 


 Patient Status [ADT] Stat ADT  12/14/19 00:18 Active


 


 Blood Glucose Check, Bedside [RC] ONETIME Care  12/13/19 20:21 Active


 


 Oxygen Therapy [RC] PRN Care  12/14/19 08:20 Ordered


 


 Pulse Oximetry [RC] ASDIRECTED Care  12/13/19 20:21 Active


 


 VTE/DVT Education [RC] PER UNIT ROUTINE Care  12/14/19 08:20 Ordered


 


 Vital Signs [RC] Q4H Care  12/14/19 01:24 Active


 


 Vital Signs [RC] Q4H Care  12/14/19 08:20 Ordered


 


 Consult to Wound Care Services [CONS] Routine Cons  12/14/19 01:48 Active


 


 Clear Liquid Diet [DIET] Diet  12/14/19 Breakfast Active


 


 CBC WITH AUTO DIFF [HEME] AM Lab  12/15/19 05:11 Ordered


 


 COMPREHENSIVE METABOLIC PN,CMP [CHEM] AM Lab  12/15/19 05:11 Ordered


 


 CULTURE BLOOD [BC] Stat Lab  12/13/19 21:15 Results


 


 CULTURE BLOOD [BC] Stat Lab  12/13/19 21:30 Received


 


 CULTURE URINE [RM] Stat Lab  12/13/19 21:38 Received


 


 MAGNESIUM [CHEM] AM Lab  12/15/19 05:11 Ordered


 


 PHOSPHORUS [CHEM] AM Lab  12/15/19 05:11 Ordered


 


 VANCOMYCIN TROUGH [CHEM] Timed Lab  12/17/19 22:00 Ordered


 


 Acetaminophen [Tylenol] Med  12/14/19 01:47 Active





 650 mg PO Q8H PRN   


 


 Cetirizine [ZyrTEC] Med  12/14/19 21:00 Ordered





 10 mg PO BEDTIME   


 


 Docusate Sodium [Colace] Med  12/14/19 09:00 Active





 100 mg PO DAILY   


 


 Docusate Sodium [Colace] Med  12/14/19 08:30 Ordered





 100 mg PO Q12H   


 


 Heparin Sodium Med  12/14/19 08:30 Ordered





 5,000 units SUBCUT Q8H   


 


 Levothyroxine [Synthroid] Med  12/14/19 07:30 Active





 88 mcg PO ACBREAKFAST   


 


 Magnesium Sulfate/Water [Magnesium Sulfate in Water Med  12/14/19 07:32 Active





 Premix] 2 gm   





 Premix Bag 1 bag   





 IV ONETIME   


 


 Meropenem Premix [Meropenem] 1 gm Med  12/14/19 09:00 Active





 Premix Bag 1 bag   





 IV Q8H   


 


 Omeprazole Med  12/14/19 07:30 Active





 20 mg PO ACBREAKFAST   


 


 Ondansetron [Zofran] Med  12/14/19 01:49 Active





 4 mg IVPUSH Q4H PRN   


 


 Oxybutynin Med  12/14/19 09:00 Active





 5 mg PO BID   


 


 Pedi Mv No.79/Ferrous Fumarate [Flintstones with Iron Med  12/14/19 09:00 

Ordered





 Tab Chew]   





 1 tab PO DAILY   


 


 Pharmacy to Dose - Vancomycin Med  12/14/19 01:45 Pending





 1 dose .XX ASDIRECTED   


 


 Sodium Chloride 0.9% [Normal Saline] 1,000 ml Med  12/14/19 01:30 Active





 IV ASDIRECTED   


 


 Sodium Chloride 0.9% [Normal Saline] 500 ml Med  12/14/19 01:30 Active





 IV .BOLUS   


 


 Sodium Chloride 0.9% [Saline Flush] Med  12/13/19 20:22 Active





 10 ml FLUSH ASDIRECTED PRN   


 


 Sodium Chloride 0.9% [Saline Flush] Med  12/13/19 20:22 Active





 2.5 ml FLUSH ASDIRECTED PRN   


 


 Vancomycin 0.75 gm Med  12/14/19 23:00 Active





 Sodium Chloride 0.9% [Normal Saline] 250 ml   





 IV Q24H   


 


 traMADol [Ultram] Med  12/14/19 06:03 Active





 100 mg PO Q8H PRN   


 


 Blood Culture x2 Reflex Set [OM.PC] Stat Oth  12/13/19 20:59 Ordered


 


 Saline Lock Insert [OM.PC] Stat Oth  12/13/19 20:21 Ordered


 


 Code Status [Resuscitation Status] Routine Resus Stat  12/14/19 08:19 Ordered








 Medication Orders





Acetaminophen (Tylenol)  650 mg PO Q8H PRN


   PRN Reason: Pain


   Last Admin: 12/14/19 04:01  Dose: 650 mg


Cetirizine HCl (Zyrtec)  10 mg PO BEDTIME [facility]


Docusate Sodium (Colace)  100 mg PO DAILY [facility]


Docusate Sodium (Colace)  100 mg PO Q12H [facility]


Heparin Sodium (Porcine) (Heparin Sodium)  5,000 units SUBCUT Q8H [facility]


Sodium Chloride (Normal Saline)  500 mls @ 999 mls/hr IV .BOLUS [facility]


   Last Admin: 12/14/19 01:34  Dose: 999 mls/hr


Sodium Chloride (Normal Saline)  1,000 mls @ 100 mls/hr IV ASDIRECTED [facility]


   Last Admin: 12/14/19 02:37  Dose: 100 mls/hr


Vancomycin HCl 0.75 gm/ Sodium (Chloride)  250 mls @ 166.667 mls/hr IV Q24H [facility]


Meropenem/Sodium Chloride 1 gm (/ Premix)  50 mls @ 100 mls/hr IV Q8H [facility]


Magnesium Sulfate 2 gm/ Premix  50 mls @ 25 mls/hr IV ONETIME ONE


   Stop: 12/14/19 09:31


   Last Admin: 12/14/19 07:43  Dose: 25 mls/hr


Levothyroxine Sodium (Synthroid)  88 mcg PO ACBREAKFAST [facility]


   Last Admin: 12/14/19 06:30  Dose: 88 mcg


Non-Formulary Medication (Pedi Mv No.79/Ferrous Fumarate [Flintstones With Iron 

Tab Chew])  1 tab PO DAILY [facility]


Omeprazole (Omeprazole)  20 mg PO ACBREAKFAST [facility]


   Last Admin: 12/14/19 06:30  Dose: 20 mg


Ondansetron HCl (Zofran)  4 mg IVPUSH Q4H PRN


   PRN Reason: Nausea


Oxybutynin Chloride (Oxybutynin)  5 mg PO BID [facility]


Sodium Chloride (Saline Flush)  10 ml FLUSH ASDIRECTED PRN


   PRN Reason: Keep Vein Open


Sodium Chloride (Saline Flush)  2.5 ml FLUSH ASDIRECTED PRN


   PRN Reason: Keep Vein Open


Tramadol HCl (Ultram)  100 mg PO Q8H PRN


   PRN Reason: Pain


   Last Admin: 12/14/19 06:25  Dose: 100 mg


Vancomycin HCl (Pharmacy To Dose - Vancomycin)  1 dose .XX ASDIRECTED [facility]








Assessment/Plan Comment:: 





Assessment and Plan:





1. Sepsis secondary to HCAP and UTI: Patient received IV NS bolus. Will 

continue IV NS maintenance at 100 cc/hr. Lactate level normal. Blood cultures 

and urine cultures pending. WBC count downtrending. Continue IV vancomycin and 

meropenem. Will add IV azithromycin. 





2. Small bowel obstruction: CT showed SBO and colon is decompressed. IV  

cc/hr. Patient has not vomited in 24 hours and last BM was yesterday. Will 

advance to soft GI diet today.





3. Chronic left foot ulcer: Wound care consulted. For pain, tramadol 100 mg 

q8h. Patient following podiatrist Dr. Jacob. Patient also has appointment with 

vascular surgeon in [place], [place] on Monday.





4. Elevated troponin likely secondary to #1, downtrending. Patient is 

asymptomatic and denied any chest pain or SOB.





5. Hypomagnesemia: Replete with IV 2g magnesium sulfate. Will recheck with AM 

labs.





6. Past medical history of HTN, hypothyroidism and CREST syndrome.





7. VTE prophylaxis: heparin.





<Amparo Mayo - Last Filed: 12/15/19 09:40>





H&P History of Present Illness





- General


Admit Problem/Dx: 


 Admission Diagnosis/Problem





Admission Diagnosis/Problem      Pneumonia











Exam





- Vital Signs


Vital Signs: 


 Last Vital Signs











Temp  36.9 C   12/15/19 07:40


 


Pulse  89   12/15/19 07:40


 


Resp  17   12/15/19 07:40


 


BP  133/63   12/15/19 07:40


 


Pulse Ox  97   12/15/19 07:40














- Patient Data


Lab Results Last 24 hrs: 


 Laboratory Results - last 24 hr











  12/15/19 12/15/19 Range/Units





  06:08 06:08 


 


WBC  14.44 H   (4.0-11.0)  K/uL


 


RBC  3.10 L   (4.30-5.90)  M/uL


 


Hgb  9.3 L   (12.0-16.0)  g/dL


 


Hct  28.6 L   (36.0-46.0)  %


 


MCV  92.3   (80.0-98.0)  fL


 


MCH  30.0   (27.0-32.0)  pg


 


MCHC  32.5   (31.0-37.0)  g/dL


 


RDW Std Deviation  54.0   (28.0-62.0)  fl


 


RDW Coeff of Rai  16 H   (11.0-15.0)  %


 


Plt Count  341   (150-400)  K/uL


 


MPV  9.40   (7.40-12.00)  fL


 


Neut % (Auto)  80.4 H   (48.0-80.0)  %


 


Lymph % (Auto)  9.9 L   (16.0-40.0)  %


 


Mono % (Auto)  5.3   (0.0-15.0)  %


 


Eos % (Auto)  4.1   (0.0-7.0)  %


 


Baso % (Auto)  0.3   (0.0-1.5)  %


 


Neut # (Auto)  11.6 H   (1.4-5.7)  K/uL


 


Lymph # (Auto)  1.4   (0.6-2.4)  K/uL


 


Mono # (Auto)  0.8   (0.0-0.8)  K/uL


 


Eos # (Auto)  0.6   (0.0-0.7)  K/uL


 


Baso # (Auto)  0.0   (0.0-0.1)  K/uL


 


Nucleated RBC %  0.0   /100WBC


 


Nucleated RBCs #  0   K/uL


 


Sodium   140  (136-145)  mmol/L


 


Potassium   3.6  (3.5-5.1)  mmol/L


 


Chloride   108 H  ()  mmol/L


 


Carbon Dioxide   17.4 L  (21.0-32.0)  mmol/L


 


BUN   12  (7.0-18.0)  mg/dL


 


Creatinine   0.6  (0.6-1.0)  mg/dL


 


Est Cr Clr Drug Dosing   51.02  mL/min


 


Estimated GFR (MDRD)   > 60.0  ml/min


 


Glucose   61 L  ()  mg/dL


 


Calcium   7.9 L  (8.5-10.1)  mg/dL


 


Phosphorus   2.0 L  (2.6-4.7)  mg/dL


 


Magnesium   1.7 L  (1.8-2.4)  mg/dL


 


Total Bilirubin   0.4  (0.2-1.0)  mg/dL


 


AST   10 L  (15-37)  IU/L


 


ALT   10 L  (14-63)  IU/L


 


Alkaline Phosphatase   96  ()  U/L


 


Total Protein   4.7 L  (6.4-8.2)  g/dL


 


Albumin   2.0 L  (3.4-5.0)  g/dL


 


Globulin   2.7  (2.6-4.0)  g/dL


 


Albumin/Globulin Ratio   0.7 L  (0.9-1.6)  











Result Diagrams: 


 12/15/19 06:08





 12/15/19 06:08


Jose J Results Last 24 hrs: 


 Microbiology











 12/13/19 21:38 Urine Culture - Final





 Urine, Clean Catch    Escherichia Coli


 


 12/13/19 21:30 Aerobic Blood Culture - Preliminary





 Blood - Venous - Lab Draw    NO GROWTH AFTER 1 DAY





 Anaerobic Blood Culture - Preliminary





    NO GROWTH AFTER 1 DAY


 


 12/13/19 21:15 Aerobic Blood Culture - Preliminary





 Blood - Venous    NO GROWTH AFTER 1 DAY





 Anaerobic Blood Culture - Final














Sepsis Event Note





- Focused Exam


Vital Signs: 


 Vital Signs











  Temp Pulse Resp BP Pulse Ox


 


 12/15/19 07:40  36.9 C  89  17  133/63  97


 


 12/15/19 04:40  37.3 C  102 H  22 H  124/52 L  90 L


 


 12/14/19 23:15  36.5 C  95  18  112/57 L  96











Date Exam was Performed: 12/15/19


Time Exam was Performed: 09:40


Orders Last 24hrs: 


 Active Orders 24 hr











 Category Date Time Status


 


 Clear Liquid Diet [DIET] Diet  12/14/19 Lunch Active


 


 VANCOMYCIN TROUGH [CHEM] Timed Lab  12/17/19 22:00 Ordered


 


 Azithromycin [Zithromax] 500 mg Med  12/14/19 10:30 Active





 Sodium Chloride 0.9% [Normal Saline (AdvBag)] 250 ml   





 IV DAILY   


 


 Cetirizine [ZyrTEC] Med  12/14/19 21:00 Active





 10 mg PO BEDTIME   


 


 Docusate Sodium [Colace] Med  12/14/19 09:00 Active





 100 mg PO DAILY   


 


 Meropenem Premix [Meropenem] 1 gm Med  12/14/19 09:00 Active





 Premix Bag 1 bag   





 IV Q8H   


 


 Multivitamins w-Iron/Ca/FA/Min [Thera M Plus] Med  12/14/19 09:00 Active





 1 tab PO DAILY   


 


 Oxybutynin Med  12/14/19 09:00 Active





 5 mg PO BID   


 


 Vancomycin 0.75 gm Med  12/14/19 23:00 Active





 Sodium Chloride 0.9% [Normal Saline] 250 ml   





 IV Q24H   








 Medication Orders





Acetaminophen (Tylenol)  650 mg PO Q8H PRN


   PRN Reason: Pain


   Last Admin: 12/15/19 08:38  Dose: 650 mg


   Admin: 12/14/19 21:24  Dose: 650 mg


   Admin: 12/14/19 12:34  Dose: 650 mg


   Admin: 12/14/19 04:01  Dose: 650 mg


Cetirizine HCl (Zyrtec)  10 mg PO BEDTIME [facility]


   Last Admin: 12/14/19 20:32  Dose: 10 mg


Docusate Sodium (Colace)  100 mg PO DAILY [facility]


   Last Admin: 12/14/19 09:30  Dose: 100 mg


Docusate Sodium (Colace)  100 mg PO Q12H [facility]


   Last Admin: 12/14/19 20:32  Dose: 100 mg


   Admin: 12/14/19 09:35  Dose: 100 mg


Heparin Sodium (Porcine) (Heparin Sodium)  5,000 units SUBCUT Q8H [facility]


   Last Admin: 12/15/19 08:36  Dose: 5,000 units


   Admin: 12/15/19 00:59  Dose: 5,000 units


   Admin: 12/14/19 16:17  Dose: 5,000 units


   Admin: 12/14/19 09:31  Dose: 5,000 units


Sodium Chloride (Normal Saline)  500 mls @ 999 mls/hr IV .BOLUS [facility]


   Last Admin: 12/14/19 01:34  Dose: 999 mls/hr


Sodium Chloride (Normal Saline)  1,000 mls @ 100 mls/hr IV ASDIRECTED [facility]


   Last Admin: 12/14/19 16:21  Dose: 100 mls/hr


   Infusion: 12/14/19 12:37  Dose: 100 mls/hr


   Admin: 12/14/19 02:37  Dose: 100 mls/hr


Vancomycin HCl 0.75 gm/ Sodium (Chloride)  250 mls @ 166.667 mls/hr IV Q24H [facility]


   Last Admin: 12/14/19 22:59  Dose: 166.667 mls/hr


Meropenem/Sodium Chloride 1 gm (/ Premix)  50 mls @ 100 mls/hr IV Q8H [facility]


   Last Admin: 12/15/19 01:05  Dose: 100 mls/hr


   Infusion: 12/14/19 16:53  Dose: 100 mls/hr


   Admin: 12/14/19 16:23  Dose: 100 mls/hr


   Infusion: 12/14/19 10:28  Dose: 100 mls/hr


   Admin: 12/14/19 09:58  Dose: 100 mls/hr


Azithromycin 500 mg/ Sodium (Chloride)  250 mls @ 250 mls/hr IV DAILY [facility]


   Last Admin: 12/15/19 08:37  Dose: 250 mls/hr


   Infusion: 12/14/19 12:16  Dose: 250 mls/hr


   Admin: 12/14/19 11:16  Dose: 250 mls/hr


Levothyroxine Sodium (Synthroid)  88 mcg PO ACBREAKFAST [facility]


   Last Admin: 12/15/19 06:32  Dose: 88 mcg


   Admin: 12/14/19 06:30  Dose: 88 mcg


Multivitamins/Minerals (Thera M Plus)  1 tab PO DAILY [facility]


   Last Admin: 12/15/19 08:36  Dose: 1 tab


   Admin: 12/14/19 09:30  Dose: 1 tab


Omeprazole (Omeprazole)  20 mg PO ACBREAKFAST [facility]


   Last Admin: 12/15/19 06:32  Dose: 20 mg


   Admin: 12/14/19 06:30  Dose: 20 mg


Ondansetron HCl (Zofran)  4 mg IVPUSH Q4H PRN


   PRN Reason: Nausea


   Last Admin: 12/14/19 10:41  Dose: 4 mg


Oxybutynin Chloride (Oxybutynin)  5 mg PO BID [facility]


   Last Admin: 12/15/19 08:36  Dose: 5 mg


   Admin: 12/14/19 20:32  Dose: 5 mg


   Admin: 12/14/19 09:30  Dose: 5 mg


Sodium Chloride (Saline Flush)  10 ml FLUSH ASDIRECTED PRN


   PRN Reason: Keep Vein Open


Sodium Chloride (Saline Flush)  2.5 ml FLUSH ASDIRECTED PRN


   PRN Reason: Keep Vein Open


Tramadol HCl (Ultram)  100 mg PO Q8H PRN


   PRN Reason: Pain


   Last Admin: 12/15/19 04:47  Dose: 100 mg


   Admin: 12/14/19 14:39  Dose: 100 mg


   Admin: 12/14/19 06:25  Dose: 100 mg


Vancomycin HCl (Pharmacy To Dose - Vancomycin)  1 dose .XX ASDIRECTED [facility]








Assessment/Plan Comment:: 


I have performed history and physical examination of the patient and have 

discussed management plan with the resident and agree with the assessment and 

plan above unless otherwise specified in my note.

## 2019-12-15 LAB
BUN SERPL-MCNC: 12 MG/DL (ref 7–18)
CHLORIDE SERPL-SCNC: 108 MMOL/L (ref 98–107)
CO2 SERPL-SCNC: 17.4 MMOL/L (ref 21–32)
GLUCOSE SERPL-MCNC: 61 MG/DL (ref 74–106)
POTASSIUM SERPL-SCNC: 3.6 MMOL/L (ref 3.5–5.1)
SODIUM SERPL-SCNC: 140 MMOL/L (ref 136–145)

## 2019-12-15 RX ADMIN — MEROPENEM AND SODIUM CHLORIDE SCH MLS/HR: 1 INJECTION, SOLUTION INTRAVENOUS at 01:05

## 2019-12-15 RX ADMIN — DEXTROSE SCH UNITS: 10 SOLUTION INTRAVENOUS at 08:36

## 2019-12-15 RX ADMIN — DEXTROSE SCH UNITS: 10 SOLUTION INTRAVENOUS at 23:41

## 2019-12-15 RX ADMIN — DEXTROSE SCH UNITS: 10 SOLUTION INTRAVENOUS at 16:18

## 2019-12-15 RX ADMIN — OMEPRAZOLE SCH MG: 20 CAPSULE, DELAYED RELEASE ORAL at 06:32

## 2019-12-15 RX ADMIN — MEROPENEM AND SODIUM CHLORIDE SCH MLS/HR: 1 INJECTION, SOLUTION INTRAVENOUS at 16:13

## 2019-12-15 RX ADMIN — SODIUM CHLORIDE SCH MLS/HR: 9 INJECTION, SOLUTION INTRAMUSCULAR; INTRAVENOUS; SUBCUTANEOUS at 21:08

## 2019-12-15 RX ADMIN — DEXTROSE SCH UNITS: 10 SOLUTION INTRAVENOUS at 00:59

## 2019-12-15 RX ADMIN — MEROPENEM AND SODIUM CHLORIDE SCH MLS/HR: 1 INJECTION, SOLUTION INTRAVENOUS at 10:04

## 2019-12-15 NOTE — PCM.PN
- General Info


Date of Service: 12/15/19


Admission Dx/Problem (Free Text): 





c/o new onset diarrhea x 4 this AM which was dark in color, is on iron tablets, 

no fevers, chills. Feels comfortable, wants to have oat meal and milk





- Review of Systems


General: Denies: Fever, Weakness, Fatigue


Pulmonary: Denies: Shortness of Breath, Pleuritic Chest Pain


Cardiovascular: Denies: Chest Pain, Palpitations


Gastrointestinal: Reports: Diarrhea.  Denies: Abdominal Pain, Constipation, 

Melena, Nausea


Genitourinary: Denies: Dysuria, Frequency, Burning


Musculoskeletal: Denies: Neck Pain, Shoulder Pain


Skin: Denies: Cyanosis, Mottled


Neurological: Denies: Confusion, Dizziness, Headache


Psychiatric: Denies: Confusion, Depression





- Patient Data


Vitals - Most Recent: 


 Last Vital Signs











Temp  36.9 C   12/15/19 07:40


 


Pulse  89   12/15/19 07:40


 


Resp  17   12/15/19 07:40


 


BP  133/63   12/15/19 07:40


 


Pulse Ox  97   12/15/19 07:40











Weight - Most Recent: 45.495 kg


I&O - Last 24 Hours: 


 Intake & Output











 12/14/19 12/15/19 12/15/19





 22:59 06:59 14:59


 


Intake Total 1400 1799 


 


Output Total 250 526 


 


Balance 1150 1273 











Lab Results Last 24 Hours: 


 Laboratory Results - last 24 hr











  12/15/19 12/15/19 Range/Units





  06:08 06:08 


 


WBC  14.44 H   (4.0-11.0)  K/uL


 


RBC  3.10 L   (4.30-5.90)  M/uL


 


Hgb  9.3 L   (12.0-16.0)  g/dL


 


Hct  28.6 L   (36.0-46.0)  %


 


MCV  92.3   (80.0-98.0)  fL


 


MCH  30.0   (27.0-32.0)  pg


 


MCHC  32.5   (31.0-37.0)  g/dL


 


RDW Std Deviation  54.0   (28.0-62.0)  fl


 


RDW Coeff of Rai  16 H   (11.0-15.0)  %


 


Plt Count  341   (150-400)  K/uL


 


MPV  9.40   (7.40-12.00)  fL


 


Neut % (Auto)  80.4 H   (48.0-80.0)  %


 


Lymph % (Auto)  9.9 L   (16.0-40.0)  %


 


Mono % (Auto)  5.3   (0.0-15.0)  %


 


Eos % (Auto)  4.1   (0.0-7.0)  %


 


Baso % (Auto)  0.3   (0.0-1.5)  %


 


Neut # (Auto)  11.6 H   (1.4-5.7)  K/uL


 


Lymph # (Auto)  1.4   (0.6-2.4)  K/uL


 


Mono # (Auto)  0.8   (0.0-0.8)  K/uL


 


Eos # (Auto)  0.6   (0.0-0.7)  K/uL


 


Baso # (Auto)  0.0   (0.0-0.1)  K/uL


 


Nucleated RBC %  0.0   /100WBC


 


Nucleated RBCs #  0   K/uL


 


Sodium   140  (136-145)  mmol/L


 


Potassium   3.6  (3.5-5.1)  mmol/L


 


Chloride   108 H  ()  mmol/L


 


Carbon Dioxide   17.4 L  (21.0-32.0)  mmol/L


 


BUN   12  (7.0-18.0)  mg/dL


 


Creatinine   0.6  (0.6-1.0)  mg/dL


 


Est Cr Clr Drug Dosing   51.02  mL/min


 


Estimated GFR (MDRD)   > 60.0  ml/min


 


Glucose   61 L  ()  mg/dL


 


Calcium   7.9 L  (8.5-10.1)  mg/dL


 


Phosphorus   2.0 L  (2.6-4.7)  mg/dL


 


Magnesium   1.7 L  (1.8-2.4)  mg/dL


 


Total Bilirubin   0.4  (0.2-1.0)  mg/dL


 


AST   10 L  (15-37)  IU/L


 


ALT   10 L  (14-63)  IU/L


 


Alkaline Phosphatase   96  ()  U/L


 


Total Protein   4.7 L  (6.4-8.2)  g/dL


 


Albumin   2.0 L  (3.4-5.0)  g/dL


 


Globulin   2.7  (2.6-4.0)  g/dL


 


Albumin/Globulin Ratio   0.7 L  (0.9-1.6)  











Jose J Results Last 24 Hours: 


 Microbiology











 12/13/19 21:38 Urine Culture - Final





 Urine, Clean Catch    Escherichia Coli


 


 12/13/19 21:30 Aerobic Blood Culture - Preliminary





 Blood - Venous - Lab Draw    NO GROWTH AFTER 1 DAY





 Anaerobic Blood Culture - Preliminary





    NO GROWTH AFTER 1 DAY


 


 12/13/19 21:15 Aerobic Blood Culture - Preliminary





 Blood - Venous    NO GROWTH AFTER 1 DAY





 Anaerobic Blood Culture - Final











Med Orders - Current: 


 Current Medications





Acetaminophen (Tylenol)  650 mg PO Q8H PRN


   PRN Reason: Pain


   Last Admin: 12/15/19 08:38 Dose:  650 mg


Cetirizine HCl (Zyrtec)  10 mg PO BEDTIME [facility]


   Last Admin: 12/14/19 20:32 Dose:  10 mg


Docusate Sodium (Colace)  100 mg PO DAILY [facility]


   Last Admin: 12/15/19 09:56 Dose:  Not Given


Docusate Sodium (Colace)  100 mg PO Q12H [facility]


   Last Admin: 12/15/19 09:55 Dose:  Not Given


Heparin Sodium (Porcine) (Heparin Sodium)  5,000 units SUBCUT Q8H [facility]


   Last Admin: 12/15/19 08:36 Dose:  5,000 units


Sodium Chloride (Normal Saline)  500 mls @ 999 mls/hr IV .BOLUS [facility]


   Last Admin: 12/14/19 01:34 Dose:  999 mls/hr


Sodium Chloride (Normal Saline)  1,000 mls @ 100 mls/hr IV ASDIRECTED [facility]


   Last Admin: 12/14/19 16:21 Dose:  100 mls/hr


Vancomycin HCl 0.75 gm/ Sodium (Chloride)  250 mls @ 166.667 mls/hr IV Q24H [facility]


   Last Admin: 12/14/19 22:59 Dose:  166.667 mls/hr


Meropenem/Sodium Chloride 1 gm (/ Premix)  50 mls @ 100 mls/hr IV Q8H [facility]


   Last Admin: 12/15/19 10:04 Dose:  100 mls/hr


Azithromycin 500 mg/ Sodium (Chloride)  250 mls @ 250 mls/hr IV DAILY [facility]


   Last Admin: 12/15/19 08:37 Dose:  250 mls/hr


Levothyroxine Sodium (Synthroid)  88 mcg PO ACBREAKFAST [facility]


   Last Admin: 12/15/19 06:32 Dose:  88 mcg


Magnesium Oxide (Magnesium Oxide)  800 mg PO ONETIME ONE


   Stop: 12/15/19 10:11


Multivitamins/Minerals (Thera M Plus)  1 tab PO DAILY [facility]


   Last Admin: 12/15/19 08:36 Dose:  1 tab


Omeprazole (Omeprazole)  20 mg PO ACBREAKFAST [facility]


   Last Admin: 12/15/19 06:32 Dose:  20 mg


Ondansetron HCl (Zofran)  4 mg IVPUSH Q4H PRN


   PRN Reason: Nausea


   Last Admin: 12/14/19 10:41 Dose:  4 mg


Oxybutynin Chloride (Oxybutynin)  5 mg PO BID [facility]


   Last Admin: 12/15/19 08:36 Dose:  5 mg


Potassium Chloride (Potassium Chloride)  40 meq PO ONETIME ONE


   Stop: 12/15/19 10:11


Sodium Chloride (Saline Flush)  10 ml FLUSH ASDIRECTED PRN


   PRN Reason: Keep Vein Open


Sodium Chloride (Saline Flush)  2.5 ml FLUSH ASDIRECTED PRN


   PRN Reason: Keep Vein Open


Tramadol HCl (Ultram)  100 mg PO Q8H PRN


   PRN Reason: Pain


   Last Admin: 12/15/19 04:47 Dose:  100 mg


Vancomycin HCl (Pharmacy To Dose - Vancomycin)  1 dose .XX ASDIRECTED [facility]





Discontinued Medications





Amlodipine Besylate (Norvasc)  2.5 mg PO DAILY [facility]


Hydromorphone HCl (Dilaudid)  0.5 mg IVPUSH ONETIME ONE


   Stop: 12/13/19 23:17


   Last Admin: 12/13/19 23:22 Dose:  0.5 mg


Pantoprazole Sodium 80 mg/ (Sodium Chloride)  20 mls @ 420 mls/hr IVPUSH 

ONETIME ONE


   Stop: 12/13/19 20:25


   Last Admin: 12/13/19 20:50 Dose:  Not Given


Sodium Chloride (Normal Saline)  1,000 mls @ 83 mls/hr IV ASDIRECTED [facility]


   Last Admin: 12/13/19 20:39 Dose:  83 mls/hr


Pantoprazole Sodium 80 mg/ (Sodium Chloride)  20 mls @ 420 mls/hr IVPUSH 

ONETIME ONE


   Stop: 12/13/19 20:49


   Last Admin: 12/13/19 20:50 Dose:  420 mls/hr


Meropenem 1 gm/ Sodium (Chloride)  100 mls @ 200 mls/hr IV ONETIME ONE


   Stop: 12/13/19 23:37


   Last Admin: 12/14/19 01:32 Dose:  Not Given


Vancomycin HCl 1 gm/ Sodium (Chloride)  250 mls @ 166 mls/hr IV ONETIME ONE


   Stop: 12/14/19 00:39


   Last Admin: 12/13/19 23:17 Dose:  166 mls/hr


Meropenem/Sodium Chloride 1 gm (/ Premix)  50 mls @ 100 mls/hr IV NOW ONE


   Stop: 12/14/19 02:14


   Last Admin: 12/14/19 01:34 Dose:  100 mls/hr


Meropenem 1 gm/ Sodium (Chloride)  50 mls @ 100 mls/hr IV Q8H [facility]


Magnesium Sulfate 2 gm/ Premix  50 mls @ 25 mls/hr IV ONETIME ONE


   Stop: 12/14/19 09:31


   Last Admin: 12/14/19 07:43 Dose:  25 mls/hr


Iopamidol (Isovue-370 (76%))  100 ml IVPUSH ONETIME ONE


   Stop: 12/13/19 21:32


   Last Admin: 12/13/19 22:05 Dose:  100 ml


Ketorolac Tromethamine (Toradol)  15 mg IVPUSH ONETIME ONE


   Stop: 12/13/19 20:24


   Last Admin: 12/13/19 20:44 Dose:  15 mg


Ondansetron HCl (Zofran)  4 mg IVPUSH ONETIME ONE


   Stop: 12/13/19 20:24


   Last Admin: 12/13/19 20:43 Dose:  4 mg


Ondansetron HCl (Zofran)  4 mg IVPUSH ONETIME ONE


   Stop: 12/13/19 23:17


   Last Admin: 12/13/19 23:22 Dose:  4 mg


Promethazine HCl (Phenergan)  12.5 mg IM ONETIME ONE


   Stop: 12/14/19 11:50


   Last Admin: 12/14/19 12:33 Dose:  12.5 mg


Tramadol HCl (Ultram)  50 mg PO ONETIME ONE


   Stop: 12/13/19 21:46


   Last Admin: 12/13/19 21:49 Dose:  50 mg











- Exam


Quality Assessment: No: Supplemental Oxygen


General: Alert, Oriented, Cooperative


Neck: Supple, Trachea Midline


Lungs: Clear to Auscultation, Normal Respiratory Effort


Cardiovascular: Regular Rate, Regular Rhythm


GI/Abdominal Exam: Normal Bowel Sounds, Soft, Non-Tender, No Distention


Extremities: Normal Inspection, Normal Range of Motion, Other (left foot 

pressure wound, dressing is clean and dry)


Peripheral Pulses: 3+: Dorsalis Pedis (L), Dorsalis Pedis (R)


Wound/Incisions: Dressing Dry and Intact


Psy/Mental Status: Alert, Normal Affect





Sepsis Event Note





- Evaluation


Sepsis Screening Result: No Definite Risk





- Focused Exam


Vital Signs: 


 Vital Signs











  Temp Pulse Resp BP Pulse Ox Pulse Ox


 


 12/15/19 07:40  36.9 C  89  17  133/63  97  97


 


 12/15/19 04:40  37.3 C  102 H  22 H  124/52 L  90 L 


 


 12/14/19 23:15  36.5 C  95  18  112/57 L  96 











Date Exam was Performed: 12/15/19


Time Exam was Performed: 10:59





- Problem List & Annotations


(1) HCAP (healthcare-associated pneumonia)


SNOMED Code(s): 635473639, 447237304


   Code(s): J18.9 - PNEUMONIA, UNSPECIFIED ORGANISM   Status: Acute   Current 

Visit: Yes   





(2) Small bowel obstruction


SNOMED Code(s): 661729558


   Code(s): K56.609 - UNSP INTESTNL OBST, UNSP AS TO PARTIAL VERSUS COMPLETE 

OBST   Status: Acute   Current Visit: Yes   





(3) UTI (urinary tract infection)


SNOMED Code(s): 57393048


   Code(s): N39.0 - URINARY TRACT INFECTION, SITE NOT SPECIFIED   Status: Acute

   Current Visit: Yes   


Qualifiers: 


   Urinary tract infection type: site unspecified   Hematuria presence: without 

hematuria   Qualified Code(s): N39.0 - Urinary tract infection, site not 

specified   





(4) Decubitus ulcer of left foot, unstageable


SNOMED Code(s): 09859364, 141378750, 74955103267986539


   Code(s): L89.890 - PRESSURE ULCER OF OTHER SITE, UNSTAGEABLE   Status: Acute

   Priority: High   Current Visit: No   





- Problem List Review


Problem List Initiated/Reviewed/Updated: Yes





- My Orders


Last 24 Hours: 


My Active Orders





12/14/19 23:00


Vancomycin 0.75 gm   Sodium Chloride 0.9% [Normal Saline] 250 ml IV Q24H 





12/15/19 10:10


Magnesium Oxide   800 mg PO ONETIME ONE 


Potassium Chloride   40 meq PO ONETIME ONE 














- Plan


Plan:: 


 83 y/o F came admitted for HCAP and UTI, there was concern of bowel obstruction


Was started on bowel regimen , now has loose stools, will send stool studies 

and check for c-diff since she is on IV antibiotics


Check stool for occult blood, although dark stools could be sec. to iron tablets

, Hb is relatively stable


WBC count improving, Will cont IV antibiotics for HCAP and UTI, urine cultures 

noted


cont. Wound care of left heel

## 2019-12-16 VITALS — HEART RATE: 90 BPM | DIASTOLIC BLOOD PRESSURE: 58 MMHG | SYSTOLIC BLOOD PRESSURE: 122 MMHG

## 2019-12-16 LAB
BUN SERPL-MCNC: 6 MG/DL (ref 7–18)
CHLORIDE SERPL-SCNC: 105 MMOL/L (ref 98–107)
CO2 SERPL-SCNC: 21.4 MMOL/L (ref 21–32)
GLUCOSE SERPL-MCNC: 73 MG/DL (ref 74–106)
POTASSIUM SERPL-SCNC: 3.6 MMOL/L (ref 3.5–5.1)
SODIUM SERPL-SCNC: 137 MMOL/L (ref 136–145)

## 2019-12-16 RX ADMIN — MEROPENEM AND SODIUM CHLORIDE SCH MLS/HR: 1 INJECTION, SOLUTION INTRAVENOUS at 01:12

## 2019-12-16 RX ADMIN — SODIUM CHLORIDE SCH MLS/HR: 9 INJECTION, SOLUTION INTRAMUSCULAR; INTRAVENOUS; SUBCUTANEOUS at 09:34

## 2019-12-16 RX ADMIN — DEXTROSE SCH UNITS: 10 SOLUTION INTRAVENOUS at 08:59

## 2019-12-16 NOTE — [id number]
<Lyle Grant - Last Filed: 12/16/19 12:59>





**Discharge Summary





- Hospital Course


Free Text/Narrative:: 


Discharge summary





Admission date: December 14, 2019


Discharge date: 12/16/2019





Admission diagnoses: Intractable nausea vomiting; left foot ulceration


[facility]


Discharge diagnoses


: Healthcare pneumonia





1.  Consultations


2.  Procedures





Hospital course:


84-year-old female presented to ED on December 14 with complaints of nausea and 

vomiting for approximately 1 week states the nausea and vomiting began there 

after taking of Norco for her pain patient has not been able to keep solids and 

liquids down for a number of days emesis was nonbloody patient does have a past 

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 

ulceration with mummification of fourth digit


We will white count was 33,000 with an elevated troponin; patient however did 

not endorse any chest pain or shortness of breath.  Chest x-ray showed 

bilateral pneumonia.  UA was positive for nitrates and esterase.  Patient was 

initially started on vancomycin and meropenem; secondary to HCAP and UTI; and 

endorsed feeling better as far as nausea is concerned.


For his left foot ulceration is concerned; patient would like to wait [facility]; understands Dr. Smith of podiatry has been following her and has a 

follow-up appointment set up for her.





On day of discharge: Patient endorsing feeling better and stronger; magnesium 

was repleted; stool studies were negative; UA :grew e. coli .  Azithromycin was 

discontinued.  Patient was sent home on levofloxacin every 48 hours and 

nitrofurantoin; secondary to sensitivities.


Patient endorsed wanting to go back to [facility] today; follow-up 

appointments with  of podiatry and primary care has been made.  Patient 

throughout time deferred recommendations for changing wrapping of left foot 

ulcer.





Discharge condition: stable 





Disposition: [facility] 


Discharge medications: see chart 


Discharge instructions: pt. advised to follow up with Dr Smith of Podiatry and 

primary care 


Follow-up: PCP 


Podiatry 





Diagnosis: Stroke: No





- Discharge Data


Discharge Date: 12/16/19


Discharge Disposition: DC/[facility]


Condition: Stable





- Referral to Home Health


Primary Care Physician: 


Susan Stiles, DO








- Discharge Diagnosis/Problem(s)


(1) HCAP (healthcare-associated pneumonia)


SNOMED Code(s): 185963522, 330357129


   ICD Code: J18.9 - PNEUMONIA, UNSPECIFIED ORGANISM   Status: Acute   Current 

Visit: Yes   





(2) UTI (urinary tract infection)


SNOMED Code(s): 98578378


   ICD Code: N39.0 - URINARY TRACT INFECTION, SITE NOT SPECIFIED   Status: 

Acute   Current Visit: Yes   


Qualifiers: 


   Urinary tract infection type: site unspecified   Hematuria presence: without 

hematuria   Qualified Code(s): N39.0 - Urinary tract infection, site not 

specified   





(3) Decubitus ulcer of heel, left, unstageable


SNOMED Code(s): 326684356


   ICD Code: L89.620 - PRESSURE ULCER OF LEFT HEEL, UNSTAGEABLE   Status: Acute

   Priority: High   Current Visit: No   





- Patient Summary/Data


Consults: 


 Consultations





12/14/19 01:48


Consult to Wound Care Services [CONS] Routine 














- Patient Instructions


Diet: Heart Healthy Diet


Fluid Restriction: 2000 mL


Activity: As Tolerated


Driving: Do Not Drive


Showering/Bathing: Shower in AM


Wound/Incision Care: Keep Operative Site/Wound Site Clean and Dry


Notify Provider of: Fever, Increased Pain, Swelling and Redness, Drainage, 

Nausea and/or Vomiting





- Discharge Plan


*PRESCRIPTION DRUG MONITORING PROGRAM REVIEWED*: No


*COPY OF PRESCRIPTION DRUG MONITORING REPORT IN PATIENT TG: No


Prescriptions/Med Rec: 


levoFLOXacin [Levaquin] 750 mg PO Q48H 7 Days #7 tablet


Nitrofurantoin Mono/Macrocryst [Nitrofurantoin Mono-MCR] 100 mg PO BID #7 cap


Home Medications: 


 Home Meds





Omeprazole Magnesium [Prilosec Otc] 20 mg PO ACBRK 12/30/14 [History]


Levothyroxine [Synthroid] 88 mcg PO ACBREAKFAST 07/28/16 [History]


Bisacodyl 10 mg RC DAILY PRN 11/01/19 [History]


Cetirizine HCl 10 mg PO BEDTIME 11/01/19 [History]


Loperamide HCl [Imodium A-D] 4 mg PO Q6H PRN 11/01/19 [History]


Oxybutynin Chloride 5 mg PO BID 11/01/19 [History]


Pedi Mv No.79/Ferrous Fumarate [Flintstones with Iron Tab Chew] 1 tab PO DAILY 

11/01/19 [History]


amLODIPine Besylate [Norvasc] 2.5 mg PO DAILY 11/01/19 [History]


Acetaminophen [Tylenol] 650 mg PO TID PRN 11/02/19 [History]


Acetaminophen with Codeine [Tylenol with Codeine #3 Tablet] 1 tab PO Q8H PRN 11/ 02/19 [History]


Docusate Sodium 100 mg PO Q12H 11/02/19 [History]


Patient's Own Medication [Ptom] 1 each PO BID 11/02/19 [History]


traMADol [Ultram] 100 mg PO Q8H PRN 11/02/19 [History]


Calcium Carbonate 200 mg PO DAILY PRN 12/13/19 [History]


Nabumetone [Relafen Ds] 500 mg PO BID 12/13/19 [History]


Promethazine HCl 12.5 mg PO BID 12/13/19 [History]


Acetaminophen [Tylenol] 650 mg PO Q8H PRN  tablet 12/16/19 [Rx]


Cetirizine [ZyrTEC] 10 mg PO BEDTIME  tablet 12/16/19 [Rx]


Nitrofurantoin Mono/Macrocryst [Nitrofurantoin Mono-MCR] 100 mg PO BID #7 cap 12 /16/19 [Rx]


levoFLOXacin [Levaquin] 750 mg PO Q48H 7 Days #7 tablet 12/16/19 [Rx]








Oxygen Therapy Mode: Room Air


Patient Handouts:  Urinary Tract Infection, Adult, Easy-to-Read


Referrals: 


Escobar Noguera MD [Physician] - 12/23/19 (on [place])


Alex Jacob DPM [Physician] - 12/20/19 1:30 pm





- Discharge Summary/Plan Comment


DC Time >30 min.: No





- Patient Data


Vitals - Most Recent: 


 Last Vital Signs











Temp  98.6 F   12/16/19 12:00


 


Pulse  90   12/16/19 12:00


 


Resp  16   12/16/19 12:00


 


BP  122/58 L  12/16/19 12:00


 


Pulse Ox  93 L  12/16/19 12:00











Weight - Most Recent: 45.495 kg


I&O - Last 24 hours: 


 Intake & Output











 12/15/19 12/16/19 12/16/19





 22:59 06:59 14:59


 


Intake Total 1610 1107 


 


Output Total 300 405 


 


Balance 1310 702 











Lab Results - Last 24 hrs: 


 Laboratory Results - last 24 hr











  12/16/19 12/16/19 Range/Units





  06:05 06:05 


 


WBC  11.33 H   (4.0-11.0)  K/uL


 


RBC  3.30 L   (4.30-5.90)  M/uL


 


Hgb  9.8 L   (12.0-16.0)  g/dL


 


Hct  30.4 L   (36.0-46.0)  %


 


MCV  92.1   (80.0-98.0)  fL


 


MCH  29.7   (27.0-32.0)  pg


 


MCHC  32.2   (31.0-37.0)  g/dL


 


RDW Std Deviation  53.8   (28.0-62.0)  fl


 


RDW Coeff of Rai  16 H   (11.0-15.0)  %


 


Plt Count  343   (150-400)  K/uL


 


MPV  9.60   (7.40-12.00)  fL


 


Neut % (Auto)  76.3   (48.0-80.0)  %


 


Lymph % (Auto)  13.4 L   (16.0-40.0)  %


 


Mono % (Auto)  5.1   (0.0-15.0)  %


 


Eos % (Auto)  4.8   (0.0-7.0)  %


 


Baso % (Auto)  0.4   (0.0-1.5)  %


 


Neut # (Auto)  8.7 H   (1.4-5.7)  K/uL


 


Lymph # (Auto)  1.5   (0.6-2.4)  K/uL


 


Mono # (Auto)  0.6   (0.0-0.8)  K/uL


 


Eos # (Auto)  0.5   (0.0-0.7)  K/uL


 


Baso # (Auto)  0.0   (0.0-0.1)  K/uL


 


Nucleated RBC %  0.0   /100WBC


 


Nucleated RBCs #  0   K/uL


 


Sodium   137  (136-145)  mmol/L


 


Potassium   3.6  (3.5-5.1)  mmol/L


 


Chloride   105  ()  mmol/L


 


Carbon Dioxide   21.4  (21.0-32.0)  mmol/L


 


BUN   6 L  (7.0-18.0)  mg/dL


 


Creatinine   0.7  (0.6-1.0)  mg/dL


 


Est Cr Clr Drug Dosing   43.74  mL/min


 


Estimated GFR (MDRD)   > 60.0  ml/min


 


Glucose   73 L  ()  mg/dL


 


Calcium   7.8 L  (8.5-10.1)  mg/dL


 


Phosphorus   1.5 L  (2.6-4.7)  mg/dL


 


Magnesium   1.3 L  (1.8-2.4)  mg/dL











RAISSA Results - Last 24 hrs: 


 Microbiology











 12/13/19 21:30 Aerobic Blood Culture - Preliminary





 Blood - Venous - Lab Draw    NO GROWTH AFTER 2 DAYS





 Anaerobic Blood Culture - Preliminary





    NO GROWTH AFTER 2 DAYS


 


 12/13/19 21:15 Aerobic Blood Culture - Preliminary





 Blood - Venous    NO GROWTH AFTER 2 DAYS





 Anaerobic Blood Culture - Final


 


 12/13/19 21:38 Urine Culture - Final





 Urine, Clean Catch    Escherichia Coli











Med Orders - Current: 


 Current Medications





Acetaminophen (Tylenol)  650 mg PO Q8H PRN


   PRN Reason: Pain


   Last Admin: 12/16/19 11:05 Dose:  650 mg


Cetirizine HCl (Zyrtec)  10 mg PO BEDTIME [facility]


   Last Admin: 12/15/19 21:10 Dose:  10 mg


Heparin Sodium (Porcine) (Heparin Sodium)  5,000 units SUBCUT Q8H [facility]


   Last Admin: 12/16/19 08:59 Dose:  5,000 units


Sodium Chloride (Normal Saline)  500 mls @ 999 mls/hr IV .BOLUS [facility]


   Last Admin: 12/14/19 01:34 Dose:  999 mls/hr


Vancomycin HCl 0.75 gm/ Sodium (Chloride)  250 mls @ 166.667 mls/hr IV Q24H [facility]


   Last Admin: 12/15/19 23:38 Dose:  166.667 mls/hr


Pantoprazole Sodium 40 mg/ (Sodium Chloride)  10 mls @ 300 mls/hr IV BID [facility]


   Last Admin: 12/16/19 09:34 Dose:  300 mls/hr


Meropenem/Sodium Chloride 1 gm (/ Premix)  50 mls @ 100 mls/hr IV Q12H [facility]


Levofloxacin (Levaquin)  750 mg PO Q48H [facility]


   Last Admin: 12/16/19 11:05 Dose:  750 mg


Levothyroxine Sodium (Synthroid)  88 mcg PO ACBREAKFAST [facility]


   Last Admin: 12/16/19 06:55 Dose:  88 mcg


Multivitamins/Minerals (Thera M Plus)  1 tab PO DAILY [facility]


   Last Admin: 12/16/19 09:01 Dose:  1 tab


Nitrofurantoin Macrocrystals (Macrobid)  100 mg PO BID [facility]


Ondansetron HCl (Zofran)  4 mg IVPUSH Q4H PRN


   PRN Reason: Nausea


   Last Admin: 12/14/19 10:41 Dose:  4 mg


Oxybutynin Chloride (Oxybutynin)  5 mg PO BID [facility]


   Last Admin: 12/16/19 09:01 Dose:  5 mg


Sodium Chloride (Saline Flush)  10 ml FLUSH ASDIRECTED PRN


   PRN Reason: Keep Vein Open


Sodium Chloride (Saline Flush)  2.5 ml FLUSH ASDIRECTED PRN


   PRN Reason: Keep Vein Open


Tramadol HCl (Ultram)  100 mg PO Q8H PRN


   PRN Reason: Pain


   Last Admin: 12/16/19 05:20 Dose:  100 mg


Vancomycin HCl (Pharmacy To Dose - Vancomycin)  1 dose .XX ASDIRECTED [facility]





Discontinued Medications





Amlodipine Besylate (Norvasc)  2.5 mg PO DAILY [facility]


Docusate Sodium (Colace)  100 mg PO DAILY [facility]


   Last Admin: 12/15/19 09:56 Dose:  Not Given


Docusate Sodium (Colace)  100 mg PO Q12H [facility]


   Last Admin: 12/15/19 09:55 Dose:  Not Given


Hydromorphone HCl (Dilaudid)  0.5 mg IVPUSH ONETIME ONE


   Stop: 12/13/19 23:17


   Last Admin: 12/13/19 23:22 Dose:  0.5 mg


Pantoprazole Sodium 80 mg/ (Sodium Chloride)  20 mls @ 420 mls/hr IVPUSH 

ONETIME ONE


   Stop: 12/13/19 20:25


   Last Admin: 12/13/19 20:50 Dose:  Not Given


Sodium Chloride (Normal Saline)  1,000 mls @ 83 mls/hr IV ASDIRECTED [facility]


   Last Admin: 12/13/19 20:39 Dose:  83 mls/hr


Pantoprazole Sodium 80 mg/ (Sodium Chloride)  20 mls @ 420 mls/hr IVPUSH 

ONETIME ONE


   Stop: 12/13/19 20:49


   Last Admin: 12/13/19 20:50 Dose:  420 mls/hr


Meropenem 1 gm/ Sodium (Chloride)  100 mls @ 200 mls/hr IV ONETIME ONE


   Stop: 12/13/19 23:37


   Last Admin: 12/14/19 01:32 Dose:  Not Given


Vancomycin HCl 1 gm/ Sodium (Chloride)  250 mls @ 166 mls/hr IV ONETIME ONE


   Stop: 12/14/19 00:39


   Last Admin: 12/13/19 23:17 Dose:  166 mls/hr


Sodium Chloride (Normal Saline)  1,000 mls @ 100 mls/hr IV ASDIRECTED [facility]


   Last Admin: 12/15/19 17:34 Dose:  100 mls/hr


Meropenem/Sodium Chloride 1 gm (/ Premix)  50 mls @ 100 mls/hr IV NOW ONE


   Stop: 12/14/19 02:14


   Last Admin: 12/14/19 01:34 Dose:  100 mls/hr


Meropenem 1 gm/ Sodium (Chloride)  50 mls @ 100 mls/hr IV Q8H [facility]


Meropenem/Sodium Chloride 1 gm (/ Premix)  50 mls @ 100 mls/hr IV Q8H [facility]


   Last Admin: 12/16/19 01:12 Dose:  100 mls/hr


Magnesium Sulfate 2 gm/ Premix  50 mls @ 25 mls/hr IV ONETIME ONE


   Stop: 12/14/19 09:31


   Last Admin: 12/14/19 07:43 Dose:  25 mls/hr


Azithromycin 500 mg/ Sodium (Chloride)  250 mls @ 250 mls/hr IV DAILY [facility]


   Last Admin: 12/16/19 09:39 Dose:  250 mls/hr


Iopamidol (Isovue-370 (76%))  100 ml IVPUSH ONETIME ONE


   Stop: 12/13/19 21:32


   Last Admin: 12/13/19 22:05 Dose:  100 ml


Ketorolac Tromethamine (Toradol)  15 mg IVPUSH ONETIME ONE


   Stop: 12/13/19 20:24


   Last Admin: 12/13/19 20:44 Dose:  15 mg


Magnesium Oxide (Magnesium Oxide)  800 mg PO ONETIME ONE


   Stop: 12/15/19 10:11


   Last Admin: 12/15/19 10:50 Dose:  800 mg


Omeprazole (Omeprazole)  20 mg PO ACBREAKFAST [facility]


   Last Admin: 12/15/19 06:32 Dose:  20 mg


Ondansetron HCl (Zofran)  4 mg IVPUSH ONETIME ONE


   Stop: 12/13/19 20:24


   Last Admin: 12/13/19 20:43 Dose:  4 mg


Ondansetron HCl (Zofran)  4 mg IVPUSH ONETIME ONE


   Stop: 12/13/19 23:17


   Last Admin: 12/13/19 23:22 Dose:  4 mg


Potassium Chloride (Potassium Chloride)  40 meq PO ONETIME ONE


   Stop: 12/15/19 10:11


   Last Admin: 12/15/19 10:50 Dose:  40 meq


Promethazine HCl (Phenergan)  12.5 mg IM ONETIME ONE


   Stop: 12/14/19 11:50


   Last Admin: 12/14/19 12:33 Dose:  12.5 mg


Sodium Phosphate (Neutra-Phos)  250 mg PO ONETIME ONE


   Stop: 12/15/19 11:34


   Last Admin: 12/15/19 12:52 Dose:  250 mg


Tramadol HCl (Ultram)  50 mg PO ONETIME ONE


   Stop: 12/13/19 21:46


   Last Admin: 12/13/19 21:49 Dose:  50 mg











<Amparo Mayo - Last Filed: 12/16/19 13:31>





**Discharge Summary





- Hospital Course


Free Text/Narrative:: 


CREST syndrome**


Left foot wound was dry and clean upon exam on day of d/c











I have seen and evaluated the patient and agree with the residents note unless 

specified in my note





- Referral to Home Health


Primary Care Physician: 


Susan Stiles, 








- Discharge Diagnosis/Problem(s)


(1) HCAP (healthcare-associated pneumonia)


SNOMED Code(s): 614005835, 892744021


   ICD Code: J18.9 - PNEUMONIA, UNSPECIFIED ORGANISM   Status: Acute   Current 

Visit: Yes   





(2) Small bowel obstruction


SNOMED Code(s): 009873450


   ICD Code: K56.609 - UNSP INTESTNL OBST, UNSP AS TO PARTIAL VERSUS COMPLETE 

OBST   Status: Acute   Current Visit: Yes   





(3) UTI (urinary tract infection)


SNOMED Code(s): 35571815


   ICD Code: N39.0 - URINARY TRACT INFECTION, SITE NOT SPECIFIED   Status: 

Acute   Current Visit: Yes   


Qualifiers: 


   Urinary tract infection type: site unspecified   Hematuria presence: without 

hematuria   Qualified Code(s): N39.0 - Urinary tract infection, site not 

specified   





(4) Decubitus ulcer of left foot, unstageable


SNOMED Code(s): 01369607, 520628596, [id number]


   ICD Code: L89.890 - PRESSURE ULCER OF OTHER SITE, UNSTAGEABLE   Status: 

Acute   Priority: High   Current Visit: No   





- Patient Summary/Data


Consults: 


 Consultations





12/14/19 01:48


Consult to Wound Care Services [CONS] Routine 














- Patient Data


Vitals - Most Recent: 


 Last Vital Signs











Temp  37.0 C   12/16/19 12:00


 


Pulse  90   12/16/19 12:00


 


Resp  16   12/16/19 12:00


 


BP  122/58 L  12/16/19 12:00


 


Pulse Ox  93 L  12/16/19 12:00











I&O - Last 24 hours: 


 Intake & Output











 12/15/19 12/16/19 12/16/19





 22:59 06:59 14:59


 


Intake Total 1610 1107 


 


Output Total 300 405 


 


Balance 1310 702 











Lab Results - Last 24 hrs: 


 Laboratory Results - last 24 hr











  12/16/19 12/16/19 Range/Units





  06:05 06:05 


 


WBC  11.33 H   (4.0-11.0)  K/uL


 


RBC  3.30 L   (4.30-5.90)  M/uL


 


Hgb  9.8 L   (12.0-16.0)  g/dL


 


Hct  30.4 L   (36.0-46.0)  %


 


MCV  92.1   (80.0-98.0)  fL


 


MCH  29.7   (27.0-32.0)  pg


 


MCHC  32.2   (31.0-37.0)  g/dL


 


RDW Std Deviation  53.8   (28.0-62.0)  fl


 


RDW Coeff of Rai  16 H   (11.0-15.0)  %


 


Plt Count  343   (150-400)  K/uL


 


MPV  9.60   (7.40-12.00)  fL


 


Neut % (Auto)  76.3   (48.0-80.0)  %


 


Lymph % (Auto)  13.4 L   (16.0-40.0)  %


 


Mono % (Auto)  5.1   (0.0-15.0)  %


 


Eos % (Auto)  4.8   (0.0-7.0)  %


 


Baso % (Auto)  0.4   (0.0-1.5)  %


 


Neut # (Auto)  8.7 H   (1.4-5.7)  K/uL


 


Lymph # (Auto)  1.5   (0.6-2.4)  K/uL


 


Mono # (Auto)  0.6   (0.0-0.8)  K/uL


 


Eos # (Auto)  0.5   (0.0-0.7)  K/uL


 


Baso # (Auto)  0.0   (0.0-0.1)  K/uL


 


Nucleated RBC %  0.0   /100WBC


 


Nucleated RBCs #  0   K/uL


 


Sodium   137  (136-145)  mmol/L


 


Potassium   3.6  (3.5-5.1)  mmol/L


 


Chloride   105  ()  mmol/L


 


Carbon Dioxide   21.4  (21.0-32.0)  mmol/L


 


BUN   6 L  (7.0-18.0)  mg/dL


 


Creatinine   0.7  (0.6-1.0)  mg/dL


 


Est Cr Clr Drug Dosing   43.74  mL/min


 


Estimated GFR (MDRD)   > 60.0  ml/min


 


Glucose   73 L  ()  mg/dL


 


Calcium   7.8 L  (8.5-10.1)  mg/dL


 


Phosphorus   1.5 L  (2.6-4.7)  mg/dL


 


Magnesium   1.3 L  (1.8-2.4)  mg/dL











[facility] Results - Last 24 hrs: 


 Microbiology











 12/13/19 21:30 Aerobic Blood Culture - Preliminary





 Blood - Venous - Lab Draw    NO GROWTH AFTER 2 DAYS





 Anaerobic Blood Culture - Preliminary





    NO GROWTH AFTER 2 DAYS


 


 12/13/19 21:15 Aerobic Blood Culture - Preliminary





 Blood - Venous    NO GROWTH AFTER 2 DAYS





 Anaerobic Blood Culture - Final


 


 12/13/19 21:38 Urine Culture - Final





 Urine, Clean Catch    Escherichia Coli











Med Orders - Current: 


 Current Medications





Acetaminophen (Tylenol)  650 mg PO Q8H PRN


   PRN Reason: Pain


   Last Admin: 12/16/19 11:05 Dose:  650 mg


Cetirizine HCl (Zyrtec)  10 mg PO BEDTIME [facility]


   Last Admin: 12/15/19 21:10 Dose:  10 mg


Heparin Sodium (Porcine) (Heparin Sodium)  5,000 units SUBCUT Q8H [facility]


   Last Admin: 12/16/19 08:59 Dose:  5,000 units


Sodium Chloride (Normal Saline)  500 mls @ 999 mls/hr IV .BOLUS [facility]


   Last Admin: 12/14/19 01:34 Dose:  999 mls/hr


Vancomycin HCl 0.75 gm/ Sodium (Chloride)  250 mls @ 166.667 mls/hr IV Q24H [facility]


   Last Admin: 12/15/19 23:38 Dose:  166.667 mls/hr


Pantoprazole Sodium 40 mg/ (Sodium Chloride)  10 mls @ 300 mls/hr IV BID [facility]


   Last Admin: 12/16/19 09:34 Dose:  300 mls/hr


Meropenem/Sodium Chloride 1 gm (/ Premix)  50 mls @ 100 mls/hr IV Q12H [facility]


Levofloxacin (Levaquin)  750 mg PO Q48H [facility]


   Last Admin: 12/16/19 11:05 Dose:  750 mg


Levothyroxine Sodium (Synthroid)  88 mcg PO ACBREAKFAST [facility]


   Last Admin: 12/16/19 06:55 Dose:  88 mcg


Multivitamins/Minerals (Thera M Plus)  1 tab PO DAILY [facility]


   Last Admin: 12/16/19 09:01 Dose:  1 tab


Nitrofurantoin Macrocrystals (Macrobid)  100 mg PO BID [facility]


Ondansetron HCl (Zofran)  4 mg IVPUSH Q4H PRN


   PRN Reason: Nausea


   Last Admin: 12/14/19 10:41 Dose:  4 mg


Oxybutynin Chloride (Oxybutynin)  5 mg PO BID [facility]


   Last Admin: 12/16/19 09:01 Dose:  5 mg


Sodium Chloride (Saline Flush)  10 ml FLUSH ASDIRECTED PRN


   PRN Reason: Keep Vein Open


Sodium Chloride (Saline Flush)  2.5 ml FLUSH ASDIRECTED PRN


   PRN Reason: Keep Vein Open


Tramadol HCl (Ultram)  100 mg PO Q8H PRN


   PRN Reason: Pain


   Last Admin: 12/16/19 05:20 Dose:  100 mg


Vancomycin HCl (Pharmacy To Dose - Vancomycin)  1 dose .XX ASDIRECTED [facility]





Discontinued Medications





Amlodipine Besylate (Norvasc)  2.5 mg PO DAILY [facility]


Docusate Sodium (Colace)  100 mg PO DAILY [facility]


   Last Admin: 12/15/19 09:56 Dose:  Not Given


Docusate Sodium (Colace)  100 mg PO Q12H [facility]


   Last Admin: 12/15/19 09:55 Dose:  Not Given


Hydromorphone HCl (Dilaudid)  0.5 mg IVPUSH ONETIME ONE


   Stop: 12/13/19 23:17


   Last Admin: 12/13/19 23:22 Dose:  0.5 mg


Pantoprazole Sodium 80 mg/ (Sodium Chloride)  20 mls @ 420 mls/hr IVPUSH 

ONETIME ONE


   Stop: 12/13/19 20:25


   Last Admin: 12/13/19 20:50 Dose:  Not Given


Sodium Chloride (Normal Saline)  1,000 mls @ 83 mls/hr IV ASDIRECTED [facility]


   Last Admin: 12/13/19 20:39 Dose:  83 mls/hr


Pantoprazole Sodium 80 mg/ (Sodium Chloride)  20 mls @ 420 mls/hr IVPUSH 

ONETIME ONE


   Stop: 12/13/19 20:49


   Last Admin: 12/13/19 20:50 Dose:  420 mls/hr


Meropenem 1 gm/ Sodium (Chloride)  100 mls @ 200 mls/hr IV ONETIME ONE


   Stop: 12/13/19 23:37


   Last Admin: 12/14/19 01:32 Dose:  Not Given


Vancomycin HCl 1 gm/ Sodium (Chloride)  250 mls @ 166 mls/hr IV ONETIME ONE


   Stop: 12/14/19 00:39


   Last Admin: 12/13/19 23:17 Dose:  166 mls/hr


Sodium Chloride (Normal Saline)  1,000 mls @ 100 mls/hr IV ASDIRECTED [facility]


   Last Admin: 12/15/19 17:34 Dose:  100 mls/hr


Meropenem/Sodium Chloride 1 gm (/ Premix)  50 mls @ 100 mls/hr IV NOW ONE


   Stop: 12/14/19 02:14


   Last Admin: 12/14/19 01:34 Dose:  100 mls/hr


Meropenem 1 gm/ Sodium (Chloride)  50 mls @ 100 mls/hr IV Q8H [facility]


Meropenem/Sodium Chloride 1 gm (/ Premix)  50 mls @ 100 mls/hr IV Q8H [facility]


   Last Admin: 12/16/19 01:12 Dose:  100 mls/hr


Magnesium Sulfate 2 gm/ Premix  50 mls @ 25 mls/hr IV ONETIME ONE


   Stop: 12/14/19 09:31


   Last Admin: 12/14/19 07:43 Dose:  25 mls/hr


Azithromycin 500 mg/ Sodium (Chloride)  250 mls @ 250 mls/hr IV DAILY [facility]


   Last Admin: 12/16/19 09:39 Dose:  250 mls/hr


Iopamidol (Isovue-370 (76%))  100 ml IVPUSH ONETIME ONE


   Stop: 12/13/19 21:32


   Last Admin: 12/13/19 22:05 Dose:  100 ml


Ketorolac Tromethamine (Toradol)  15 mg IVPUSH ONETIME ONE


   Stop: 12/13/19 20:24


   Last Admin: 12/13/19 20:44 Dose:  15 mg


Magnesium Oxide (Magnesium Oxide)  800 mg PO ONETIME ONE


   Stop: 12/15/19 10:11


   Last Admin: 12/15/19 10:50 Dose:  800 mg


Omeprazole (Omeprazole)  20 mg PO ACBREAKFAST [facility]


   Last Admin: 12/15/19 06:32 Dose:  20 mg


Ondansetron HCl (Zofran)  4 mg IVPUSH ONETIME ONE


   Stop: 12/13/19 20:24


   Last Admin: 12/13/19 20:43 Dose:  4 mg


Ondansetron HCl (Zofran)  4 mg IVPUSH ONETIME ONE


   Stop: 12/13/19 23:17


   Last Admin: 12/13/19 23:22 Dose:  4 mg


Potassium Chloride (Potassium Chloride)  40 meq PO ONETIME ONE


   Stop: 12/15/19 10:11


   Last Admin: 12/15/19 10:50 Dose:  40 meq


Promethazine HCl (Phenergan)  12.5 mg IM ONETIME ONE


   Stop: 12/14/19 11:50


   Last Admin: 12/14/19 12:33 Dose:  12.5 mg


Sodium Phosphate (Neutra-Phos)  250 mg PO ONETIME ONE


   Stop: 12/15/19 11:34


   Last Admin: 12/15/19 12:52 Dose:  250 mg


Tramadol HCl (Ultram)  50 mg PO ONETIME ONE


   Stop: 12/13/19 21:46


   Last Admin: 12/13/19 21:49 Dose:  50 mg

## 2020-02-18 ENCOUNTER — HOSPITAL ENCOUNTER (INPATIENT)
Dept: HOSPITAL 56 - MW.ED | Age: 85
LOS: 6 days | DRG: 871 | End: 2020-02-24
Attending: INTERNAL MEDICINE | Admitting: INTERNAL MEDICINE
Payer: MEDICARE

## 2020-02-18 DIAGNOSIS — A41.51: Primary | ICD-10-CM

## 2020-02-18 DIAGNOSIS — I73.00: ICD-10-CM

## 2020-02-18 DIAGNOSIS — N39.0: ICD-10-CM

## 2020-02-18 DIAGNOSIS — E87.0: ICD-10-CM

## 2020-02-18 DIAGNOSIS — Z83.3: ICD-10-CM

## 2020-02-18 DIAGNOSIS — N17.9: ICD-10-CM

## 2020-02-18 DIAGNOSIS — Z96.659: ICD-10-CM

## 2020-02-18 DIAGNOSIS — Z90.710: ICD-10-CM

## 2020-02-18 DIAGNOSIS — E87.2: ICD-10-CM

## 2020-02-18 DIAGNOSIS — Z88.0: ICD-10-CM

## 2020-02-18 DIAGNOSIS — Z88.8: ICD-10-CM

## 2020-02-18 DIAGNOSIS — Z91.040: ICD-10-CM

## 2020-02-18 DIAGNOSIS — Z79.01: ICD-10-CM

## 2020-02-18 DIAGNOSIS — E83.42: ICD-10-CM

## 2020-02-18 DIAGNOSIS — M34.1: ICD-10-CM

## 2020-02-18 DIAGNOSIS — N32.81: ICD-10-CM

## 2020-02-18 DIAGNOSIS — Z79.890: ICD-10-CM

## 2020-02-18 DIAGNOSIS — J44.0: ICD-10-CM

## 2020-02-18 DIAGNOSIS — Z51.5: ICD-10-CM

## 2020-02-18 DIAGNOSIS — Z89.422: ICD-10-CM

## 2020-02-18 DIAGNOSIS — I95.9: ICD-10-CM

## 2020-02-18 DIAGNOSIS — J96.01: ICD-10-CM

## 2020-02-18 DIAGNOSIS — M79.605: ICD-10-CM

## 2020-02-18 DIAGNOSIS — E78.00: ICD-10-CM

## 2020-02-18 DIAGNOSIS — K21.9: ICD-10-CM

## 2020-02-18 DIAGNOSIS — J18.9: ICD-10-CM

## 2020-02-18 DIAGNOSIS — E03.9: ICD-10-CM

## 2020-02-18 DIAGNOSIS — Z88.2: ICD-10-CM

## 2020-02-18 DIAGNOSIS — I48.91: ICD-10-CM

## 2020-02-18 DIAGNOSIS — Z88.6: ICD-10-CM

## 2020-02-18 DIAGNOSIS — Z98.49: ICD-10-CM

## 2020-02-18 DIAGNOSIS — M79.604: ICD-10-CM

## 2020-02-18 DIAGNOSIS — R65.21: ICD-10-CM

## 2020-02-18 DIAGNOSIS — Z79.899: ICD-10-CM

## 2020-02-18 DIAGNOSIS — E87.1: ICD-10-CM

## 2020-02-18 DIAGNOSIS — Z99.81: ICD-10-CM

## 2020-02-18 DIAGNOSIS — I10: ICD-10-CM

## 2020-02-18 DIAGNOSIS — G89.29: ICD-10-CM

## 2020-02-18 DIAGNOSIS — K58.9: ICD-10-CM

## 2020-02-18 DIAGNOSIS — R79.89: ICD-10-CM

## 2020-02-18 DIAGNOSIS — M19.90: ICD-10-CM

## 2020-02-18 DIAGNOSIS — Z88.5: ICD-10-CM

## 2020-02-18 LAB
BUN SERPL-MCNC: 33 MG/DL (ref 7–18)
BUN SERPL-MCNC: 35 MG/DL (ref 7–18)
CHLORIDE SERPL-SCNC: 106 MMOL/L (ref 98–107)
CHLORIDE SERPL-SCNC: 107 MMOL/L (ref 98–107)
CO2 SERPL-SCNC: 21.7 MMOL/L (ref 21–32)
CO2 SERPL-SCNC: 22.9 MMOL/L (ref 21–32)
GLUCOSE SERPL-MCNC: 100 MG/DL (ref 74–106)
GLUCOSE SERPL-MCNC: 98 MG/DL (ref 74–106)
POTASSIUM SERPL-SCNC: 4.2 MMOL/L (ref 3.5–5.1)
POTASSIUM SERPL-SCNC: 4.5 MMOL/L (ref 3.5–5.1)
SODIUM SERPL-SCNC: 139 MMOL/L (ref 136–145)
SODIUM SERPL-SCNC: 140 MMOL/L (ref 136–145)

## 2020-02-18 PROCEDURE — 99291 CRITICAL CARE FIRST HOUR: CPT

## 2020-02-18 PROCEDURE — 93005 ELECTROCARDIOGRAM TRACING: CPT

## 2020-02-18 PROCEDURE — 71045 X-RAY EXAM CHEST 1 VIEW: CPT

## 2020-02-18 PROCEDURE — 87186 SC STD MICRODIL/AGAR DIL: CPT

## 2020-02-18 PROCEDURE — 84484 ASSAY OF TROPONIN QUANT: CPT

## 2020-02-18 PROCEDURE — C9113 INJ PANTOPRAZOLE SODIUM, VIA: HCPCS

## 2020-02-18 PROCEDURE — 84443 ASSAY THYROID STIM HORMONE: CPT

## 2020-02-18 PROCEDURE — 83605 ASSAY OF LACTIC ACID: CPT

## 2020-02-18 PROCEDURE — 81001 URINALYSIS AUTO W/SCOPE: CPT

## 2020-02-18 PROCEDURE — 96360 HYDRATION IV INFUSION INIT: CPT

## 2020-02-18 PROCEDURE — 80048 BASIC METABOLIC PNL TOTAL CA: CPT

## 2020-02-18 PROCEDURE — 82803 BLOOD GASES ANY COMBINATION: CPT

## 2020-02-18 PROCEDURE — B543ZZA ULTRASONOGRAPHY OF RIGHT JUGULAR VEINS, GUIDANCE: ICD-10-PCS | Performed by: INTERNAL MEDICINE

## 2020-02-18 PROCEDURE — 99292 CRITICAL CARE ADDL 30 MIN: CPT

## 2020-02-18 PROCEDURE — 70450 CT HEAD/BRAIN W/O DYE: CPT

## 2020-02-18 PROCEDURE — 83880 ASSAY OF NATRIURETIC PEPTIDE: CPT

## 2020-02-18 PROCEDURE — 87088 URINE BACTERIA CULTURE: CPT

## 2020-02-18 PROCEDURE — 03HY32Z INSERTION OF MONITORING DEVICE INTO UPPER ARTERY, PERCUTANEOUS APPROACH: ICD-10-PCS | Performed by: INTERNAL MEDICINE

## 2020-02-18 PROCEDURE — 80053 COMPREHEN METABOLIC PANEL: CPT

## 2020-02-18 PROCEDURE — 36556 INSERT NON-TUNNEL CV CATH: CPT

## 2020-02-18 PROCEDURE — 87086 URINE CULTURE/COLONY COUNT: CPT

## 2020-02-18 PROCEDURE — 87040 BLOOD CULTURE FOR BACTERIA: CPT

## 2020-02-18 PROCEDURE — 87899 AGENT NOS ASSAY W/OPTIC: CPT

## 2020-02-18 PROCEDURE — 3E033XZ INTRODUCTION OF VASOPRESSOR INTO PERIPHERAL VEIN, PERCUTANEOUS APPROACH: ICD-10-PCS | Performed by: INTERNAL MEDICINE

## 2020-02-18 PROCEDURE — 85025 COMPLETE CBC W/AUTO DIFF WBC: CPT

## 2020-02-18 PROCEDURE — 86140 C-REACTIVE PROTEIN: CPT

## 2020-02-18 PROCEDURE — 36415 COLL VENOUS BLD VENIPUNCTURE: CPT

## 2020-02-18 PROCEDURE — 02H633Z INSERTION OF INFUSION DEVICE INTO RIGHT ATRIUM, PERCUTANEOUS APPROACH: ICD-10-PCS | Performed by: INTERNAL MEDICINE

## 2020-02-18 RX ADMIN — SODIUM CHLORIDE SCH MLS/HR: 9 INJECTION, SOLUTION INTRAMUSCULAR; INTRAVENOUS; SUBCUTANEOUS at 20:12

## 2020-02-18 RX ADMIN — FENTANYL CITRATE PRN MCG: 50 INJECTION, SOLUTION INTRAMUSCULAR; INTRAVENOUS at 23:00

## 2020-02-18 NOTE — EDM.PDOC
ED [facility] GENERAL MEDICAL PROBLEM





- General


Chief Complaint: General


Stated Complaint: LOW B/P


Time Seen by Provider: 02/18/20 13:15


Source of Information: Reports: Patient, EMS, Nursing Home Records


History Limitations: Reports: No Limitations





- History of Present Illness


INITIAL COMMENTS - FREE TEXT/NARRATIVE: 


Patient is an 84-year-old female with a past medical history of hypertension 

and lower extremity cellulitis presenting with a chief complaint of low blood 

pressure.  Patient is coming from nursing home and was found to be hypotensive 

this afternoon.  Patient states that she feels fatigued and is having pain in 

both of her legs.  The pain is crampy in nature and has been a chronic issue 

for her for which she takes tramadol. Patient states that she had several 

episodes of large amounts of vomit yesterday evening.  Patient denies any 

vomiting today.  Patient denies any chest pain or shortness of breath.  Patient 

states she was treated last month for an aspiration pneumonia after vomiting.





Pmhx: Hypertension, GERD


Family Hx: noncontributory 





Smoking history? no 


Etoh use? none


Drug use? none





In addition to that documented in the HPI above, the additional ROS was obtained

:


Constitutional: Denies fevers or chills


Eyes: Denies vision changes


ENMT: Denies sore throat


CV: Denies chest pain


Resp: Denies SOB


GI: Per HPI


: Denies painful urination


MSK: Denies recent trauma


Skin: Denies new rashes


Neuro: Denies new numbness or tingling or weakness


Endocrine: Denies unexpected weight loss


Heme: Denies bleeding disorders





I have reviewed the triage vital signs


Const: Well nourished, well developed, appears stated age


Eyes: Ecchymosis adjacent to the left eye without tenderness or swelling.  No 

proptosis noted PERRL, no conjunctival injection


HENT: Dry mucous membranes


CV: RRR, cool extremities.


RESP: CTAB, Unlabored respiratory effort


GI: soft, non-tender, non-distended, no masses


MSK: No gross deformities appreciated


Skin: Warm, dry. No rashes. 


Neuro: Alert and oriented x3.  Moving all 4 extremities equally


Psych: Appropriate mood and affect





Assessment and plan:


Patient 84-year-old female presenting with hypotension from a nursing facility.

  Patient's blood pressure was initially hypotensive with a range in the 60s 

over 40s however patient maintained in normal mental status.  Patient did not 

have any fever on rectal temperature but did have elevation of white blood cell 

count.  Patient given bolus of IV fluids at 30 cc/kg with mild improvement in 

blood pressure.  Patient chest x-ray demonstrates bibasilar pneumonia which may 

be a source of his hypotension as it may be secondary to sepsis and volume 

depletion secondary to vomiting.  Patient was started on Levophed in the 

emergency department to help maintain adequate blood pressure.  Other 

differential diagnosis considered were pulmonary embolism however in the 

setting of pneumonia seems less likely.  In addition, the patient is not 

complaining of chest pain or shortness of breath.  Patient did have slight 

elevation in troponin is likely secondary to demand ischemia as there are no 

EKG changes.  I do not believe that the troponin elevation is secondary to an 

acute coronary syndrome.  Of note, there was delay of fluid resuscitation 

secondary to difficult IV access.  A central line was placed to help with IV 

fluids and medication administration.





Critical Care Procedure Note


Authorized and Performed by: Dr. Peter


Total critical care time: Approximately 75 minutes


Due to a high probability of clinically significant, life threatening 

deterioration, the patient required my highest level of preparedness to 

intervene emergently and I personally spent this critical care time directly 

and personally managing the patient. This critical care time included obtaining 

a history; examining the patient; pulse oximetry; ordering and review of studies

; arranging urgent treatment with development of a management plan; evaluation 

of patient's response to treatment; frequent reassessment; and, discussions 

with other providers.


This critical care time was performed to assess and manage the high probability 

of imminent, life-threatening deterioration that could result in multi-organ 

failure. It was exclusive of separately billable procedures and treating other 

patients and teaching time.


Please see MDM section and the rest of the note for further information on 

patient assessment and treatment.





Sepsis reassessment:


Patient's initial lactate was 1.8 and after resuscitation was 1.1.  Patient has 

no changes in mental status and feels that her symptoms are improving.  Renal 

function is improving and there are no other signs of endorgan ischemia.  

Patient is doing well on levophed with blood pressures in the 90s over 50s. 














Treatments PTA: Reports: IV/IO, Other (see below)


Other Treatments PTA: IVF, Dopamine started by EMS


  ** right leg


Pain Score (Numeric/FACES): 8





- Related Data


 Allergies











Allergy/AdvReac Type Severity Reaction Status Date / Time


 


codeine Allergy  Vomiting Verified 02/19/20 02:23


 


latex Allergy  Rash Verified 02/19/20 02:23


 


morphine Allergy  Difficulty Verified 02/19/20 02:23





   Breathing  


 


oxycodone HCl [From Percocet] Allergy  Vomiting Verified 02/19/20 02:23


 


Penicillins Allergy  Anaphylactic Verified 02/19/20 02:23





   Shock  


 


Sulfa (Sulfonamide Allergy  Anaphylactic Verified 02/19/20 02:23





Antibiotics)   Shock  


 


all pain meds Allergy  Nausea Uncoded 02/18/20 12:49











Home Meds: 


 Home Meds





Omeprazole Magnesium [Prilosec Otc] 20 mg PO ACBRK 12/30/14 [History]


Levothyroxine [Synthroid] 75 mcg PO ACBREAKFAST 07/28/16 [History]


Cetirizine HCl 10 mg PO BEDTIME 11/01/19 [History]


Loperamide HCl [Imodium A-D] 4 mg PO Q6H PRN 11/01/19 [History]


Oxybutynin Chloride 5 mg PO BID 11/01/19 [History]


bisacodyL [Bisacodyl] 10 mg RC DAILY PRN 11/01/19 [History]


Acetaminophen [Tylenol] 650 mg PO TID PRN 11/02/19 [History]


Docusate Sodium 100 mg PO Q12H 11/02/19 [History]


Patient's Own Medication [Ptom] 1 each PO BID 11/02/19 [History]


traMADol [Ultram] 100 mg PO Q8H PRN 11/02/19 [History]


Calcium Carbonate 200 mg PO DAILY PRN 12/13/19 [History]


Promethazine HCl 12.5 mg PO BID 12/13/19 [History]


Acetaminophen [Tylenol] 650 mg PO Q8H PRN  tablet 12/16/19 [Rx]


Cetirizine [ZyrTEC] 10 mg PO BEDTIME  tablet 12/16/19 [Rx]


Hydrocodone/Acetaminophen [Norco  Tablet] 1 each PO ASDIRECTED 02/18/20 [

History]


Rivaroxaban [Xarelto] 10 mg PO DAILY 02/18/20 [History]


amLODIPine Besylate [Norvasc] 10 mg PO DAILY 02/18/20 [History]











Past Medical History





- Past Health History


Medical/Surgical History: Denies Medical/Surgical History


HEENT History: Reports: Cataract


Other HEENT History: has upper denture and lower partial


Cardiovascular History: Reports: None


Respiratory History: Reports: None


Gastrointestinal History: Reports: GERD, Irritable Bowel Syndrome, Other (See 

Below)


Other Gastrointestinal History: IBS-Diarrhea


Genitourinary History: Reports: Other (See Below)


Other Genitourinary History: overactive bladder


OB/GYN History: Reports: Pregnancy


Musculoskeletal History: Reports: Arthritis


Neurological History: Reports: Other (See Below)


Other Neuro History: hx of motion sickness


Psychiatric History: Reports: None


Endocrine/Metabolic History: Reports: Hypothyroidism


Hematologic History: Reports: None


Immunologic History: Reports: Other (See Below)


Other Immunologic History: CREST syndrome


Oncologic (Cancer) History: Reports: None


Dermatologic History: Reports: None, Other (See Below)


Other Dermatologic History: Raynaud's Syndrome





- Infectious Disease History


Infectious Disease History: Reports: Chicken Pox, Measles, Mumps





- Past Surgical History


Head Surgeries/Procedures: Reports: None


HEENT Surgical History: Reports: Cataract Surgery


Female  Surgical History: Reports: Hysterectomy


Musculoskeletal Surgical History: Reports: Knee Replacement, ORIF, Shoulder 

Surgery





Social & Family History





- Family History


Family Medical History: Noncontributory


HEENT: Reports: Cataract


Respiratory: Reports: COPD


Musculoskeletal: Reports: Arthritis


Psychiatric: Reports: Depression


Endocrine/Metabolic: Reports: Diabetes, type II





- Caffeine Use


Caffeine Use: Reports: Coffee, Soda, Tea





ED ROS GENERAL





- Review of Systems


Review Of Systems: See Below





ED EXAM, GENERAL





- Physical Exam


Exam: See Below





ED GENERAL MEDICAL PROCEDURES





- Additional/Other Procedure(s)


Other (Free Text) Procedure(s): 


Central Venous Catheter (CVC, Central Line) Placement


Date: 2/18/20   


Time: 1600


Indication: Requiring vasopressors. Intravenous access


Attending: Brittani OVALLES time-out was completed verifying correct patient, procedure, site, positioning

, and special equipment if applicable. The patient was placed in a dependent 

position appropriate for central line placement based on the vein to be 

cannulated. The patients <right < neck was prepped and draped in sterile 

fashion. 1% Lidocaine was used to anesthetize the surrounding skin area. A 

triple lumen 9-Frenchcatheter was introduced into the the internal jugular vein 

using the Seldinger technique and under ultrasound guidance. The catheter was 

threaded smoothly over the guide wire and appropriate blood return was 

obtained. Each lumen of the catheter was evacuated of air and flushed with 

sterile saline. The catheter was then sutured in place to the skin and a 

sterile dressing applied. Perfusion to the extremity distal to the point of 

catheter insertion was checked and found to be adequate.





Estimated Blood Loss: 5 mL


The patient tolerated the procedure well and there were no complications.








Course





- Vital Signs


Last Recorded V/S: 


 Last Vital Signs











Temp  36.8 C   02/19/20 04:00


 


Pulse  108 H  02/18/20 19:29


 


Resp  24 H  02/19/20 07:00


 


BP  123/58 L  02/19/20 07:00


 


Pulse Ox  94 L  02/19/20 07:00














- Orders/Labs/Meds


Orders: 


 Active Orders 24 hr











 Category Date Time Status


 


 EKG Documentation Completion [RC] STAT Care  02/18/20 15:04 Inactive


 


 CULTURE URINE [RM] Stat Lab  02/18/20 12:55 Received


 


 Norepinephrine Bit/0.9 % NaCl [Norepinephr-0.9% NaCl 4 Med  02/18/20 14:00 

Active





 mg/250]   





 4 mg in 250 ml IV TITRATE   


 


 Sodium Chloride 0.9% [Saline Flush] Med  02/18/20 12:52 Active





 10 ml FLUSH ASDIRECTED PRN   


 


 Sodium Chloride 0.9% [Saline Flush] Med  02/18/20 12:52 Active





 2.5 ml FLUSH ASDIRECTED PRN   


 


 Saline Lock Insert [OM.PC] Stat Oth  02/18/20 12:52 Ordered








 Medication Orders





Acetaminophen (Tylenol)  650 mg PO Q4H PRN


   PRN Reason: Pain (Mild 1-3)/fever


   Last Admin: 02/19/20 05:37  Dose: 650 mg


   Admin: 02/18/20 20:38  Dose: 650 mg


Docusate Sodium (Colace)  100 mg PO BID PRN


   PRN Reason: Constipation


Hydrocortisone Sodium Succinate (Solu-Cortef)  100 mg IVPUSH Q8H [facility]


Norepinephrine Bitartrate (Norepinephr-0.9% Nacl 4 Mg/250)  4 mg in 250 mls @ 

7.5 mls/hr IV TITRATE [facility]ANSA; Protocol


   Last Titration: 02/19/20 06:52  Dose: 12 mcg/min, 45 mls/hr


   Titration: 02/19/20 06:12  Dose: 13 mcg/min, 48.75 mls/hr


   Titration: 02/19/20 05:55  Dose: 14 mcg/min, 52.5 mls/hr


   Titration: 02/19/20 04:45  Dose: 15 mcg/min, 56.25 mls/hr


   Admin: 02/19/20 04:09  Dose: 16 mcg/min, 60 mls/hr


   Titration: 02/19/20 04:06  Dose: 16 mcg/min, 60 mls/hr


   Titration: 02/19/20 03:01  Dose: 16 mcg/min, 60 mls/hr


   Titration: 02/19/20 02:44  Dose: 17 mcg/min, 63.75 mls/hr


   Titration: 02/19/20 02:05  Dose: 18 mcg/min, 67.5 mls/hr


   Titration: 02/19/20 00:49  Dose: 19 mcg/min, 71.25 mls/hr


   Admin: 02/19/20 00:18  Dose: 17 mcg/min, 63.75 mls/hr


   Titration: 02/19/20 00:13  Dose: 17 mcg/min, 63.75 mls/hr


   Titration: 02/18/20 23:35  Dose: 17 mcg/min, 63.75 mls/hr


   Titration: 02/18/20 20:33  Dose: 18 mcg/min, 67.5 mls/hr


   Admin: 02/18/20 20:28  Dose: 15 mcg/min, 56.25 mls/hr


   Titration: 02/18/20 20:02  Dose: 15 mcg/min, 56.25 mls/hr


   Titration: 02/18/20 19:20  Dose: 15 mcg/min, 56.25 mls/hr


   Titration: 02/18/20 15:05  Dose: 12 mcg/min, 45 mls/hr


   Titration: 02/18/20 14:30  Dose: 8 mcg/min, 30 mls/hr


   Admin: 02/18/20 14:06  Dose: 2 mcg/min, 7.5 mls/hr


Sodium Chloride (Normal Saline)  1,000 mls @ 125 mls/hr IV CONTINUOUS HANSA


   Last Admin: 02/19/20 04:49  Dose: 125 mls/hr


   Infusion: 02/19/20 04:49  Dose: 125 mls/hr


   Admin: 02/18/20 20:57  Dose: 125 mls/hr


Pantoprazole Sodium 40 mg/ (Sodium Chloride)  10 mls @ 300 mls/hr IV DAILY [facility]


   Last Admin: 02/18/20 20:12  Dose: 300 mls/hr


Ertapenem 1 gm/ Sodium (Chloride)  50 mls @ 100 mls/hr IV Q24H [facility]


   Last Admin: 02/18/20 20:15  Dose: 100 mls/hr


Vancomycin HCl 1 gm/ Sodium (Chloride)  250 mls @ 166 mls/hr IV Q24H [facility]


   Last Admin: 02/18/20 20:45  Dose: 166 mls/hr


Vasopressin 100 units/ Sodium (Chloride)  100 mls @ 1.8 mls/hr IV CONTINUOUS [facility]


   Last Admin: 02/18/20 22:40  Dose: 0.03 units/min, 1.8 mls/hr


Magnesium Sulfate 4 gm/ Premix  100 mls @ 50 mls/hr IV ONETIME ONE


   Stop: 02/19/20 08:37


   Last Admin: 02/19/20 06:48  Dose: 50 mls/hr


Levothyroxine Sodium (Levothyroxine)  75 mcg PO ACBREAKFAST [facility]


   Last Admin: 02/19/20 06:36  Dose: 75 mcg


Ondansetron HCl (Zofran Odt)  4 mg PO Q4H PRN


   PRN Reason: nausea, able to take PO


Ondansetron HCl (Zofran)  4 mg IVPUSH Q4H PRN


   PRN Reason: Nausea


Polyethylene Glycol (Miralax)  17 gm PO DAILY PRN


   PRN Reason: Constipation


Rivaroxaban (Xarelto)  10 mg PO DAILY [facility]


Sodium Chloride (Saline Flush)  10 ml FLUSH ASDIRECTED PRN


   PRN Reason: Keep Vein Open


Sodium Chloride (Saline Flush)  2.5 ml FLUSH ASDIRECTED PRN


   PRN Reason: Keep Vein Open


Tramadol HCl (Ultram)  100 mg PO Q8H PRN


   PRN Reason: Pain


   Last Admin: 02/19/20 05:36  Dose: 100 mg


   Admin: 02/18/20 21:15  Dose: 100 mg


Vancomycin HCl (Pharmacy To Dose - Vancomycin)  1 dose .XX ASDIRECTED [facility]








Labs: 


 Laboratory Tests











  02/18/20 02/18/20 02/18/20 Range/Units





  12:55 13:33 13:33 


 


WBC   31.73 H   (4.0-11.0)  K/uL


 


RBC   3.18 L   (4.30-5.90)  M/uL


 


Hgb   9.6 L   (12.0-16.0)  g/dL


 


Hct   29.7 L   (36.0-46.0)  %


 


MCV   93.4   (80.0-98.0)  fL


 


MCH   30.2   (27.0-32.0)  pg


 


MCHC   32.3   (31.0-37.0)  g/dL


 


RDW Std Deviation   50.6   (28.0-62.0)  fl


 


RDW Coeff of Rai   15   (11.0-15.0)  %


 


Plt Count   271   (150-400)  K/uL


 


MPV   9.60   (7.40-12.00)  fL


 


Add Manual Diff   YES   


 


Neutrophils % (Manual)   83 H   (48.0-80.0)  %


 


Band Neutrophils %   10   %


 


Lymphocytes % (Manual)   4 L   (16.0-40.0)  %


 


Monocytes % (Manual)   3   (0.0-15.0)  %


 


Nucleated RBC %   0.0   /100WBC


 


Absolute Seg Neuts   26.3 H   (1.4-5.7)  


 


Band Neutrophils #   3.2   


 


Lymphocytes # (Manual)   1.3   (0.6-2.4)  


 


Monocytes # (Manual)   1.0 H   (0.0-0.8)  


 


Nucleated RBCs #   0   K/uL


 


VBG pH     (7.31-7.41)  


 


VBG pCO2     (35-45)  mmHG


 


VBG pO2     (30-40)  mmHG


 


VBG HCO3     (22-30)  mEq/L


 


VBG Total CO2     (41-51)  mmol/L


 


VBG Base Excess     (-3.0-3.0)  


 


Lactate     (0.20-2.00)  mmol/L


 


Sodium    139  (136-145)  mmol/L


 


Potassium    4.5  (3.5-5.1)  mmol/L


 


Chloride    106  ()  mmol/L


 


Carbon Dioxide    21.7  (21.0-32.0)  mmol/L


 


BUN    35 H  (7.0-18.0)  mg/dL


 


Creatinine    1.8 H  (0.6-1.0)  mg/dL


 


Est Cr Clr Drug Dosing    16.69  mL/min


 


Estimated GFR (MDRD)    26.9  ml/min


 


Glucose    98  ()  mg/dL


 


Calcium    8.7  (8.5-10.1)  mg/dL


 


Total Bilirubin    0.5  (0.2-1.0)  mg/dL


 


AST    22  (15-37)  IU/L


 


ALT    5 L  (14-63)  IU/L


 


Alkaline Phosphatase    74  ()  U/L


 


Troponin I    0.059 H*  (0.000-0.056)  ng/mL


 


C-Reactive Protein     (0.00-0.90)  mg/dL


 


B-Natriuretic Peptide     (<100)  PG/ML


 


Total Protein    5.3 L  (6.4-8.2)  g/dL


 


Albumin    2.1 L  (3.4-5.0)  g/dL


 


Globulin    3.2  (2.6-4.0)  g/dL


 


Albumin/Globulin Ratio    0.7 L  (0.9-1.6)  


 


TSH 3rd Generation     (0.36-3.74)  uIU/mL


 


Urine Color  YELLOW    


 


Urine Appearance  SLT CLOUDY    


 


Urine pH  5.5    (5.0-8.0)  


 


Ur Specific Gravity  1.025    (1.001-1.035)  


 


Urine Protein  TRACE H    (NEGATIVE)  mg/dL


 


Urine Glucose (UA)  NEGATIVE    (NEGATIVE)  mg/dL


 


Urine Ketones  NEGATIVE    (NEGATIVE)  mg/dL


 


Urine Occult Blood  SMALL H    (NEGATIVE)  


 


Urine Nitrite  NEGATIVE    (NEGATIVE)  


 


Urine Bilirubin  NEGATIVE    (NEGATIVE)  


 


Urine Urobilinogen  0.2    (<2.0)  EU/dL


 


Ur Leukocyte Esterase  MODERATE H    (NEGATIVE)  


 


Urine RBC  0-4    (0-2/HPF)  


 


Urine WBC  20-30    (0-5/HPF)  


 


Ur Epithelial Cells  RARE    (NONE-FEW)  


 


Urine Bacteria  3+ H    (NEGATIVE)  














  02/18/20 02/18/20 02/18/20 Range/Units





  13:33 13:33 13:33 


 


WBC     (4.0-11.0)  K/uL


 


RBC     (4.30-5.90)  M/uL


 


Hgb     (12.0-16.0)  g/dL


 


Hct     (36.0-46.0)  %


 


MCV     (80.0-98.0)  fL


 


MCH     (27.0-32.0)  pg


 


MCHC     (31.0-37.0)  g/dL


 


RDW Std Deviation     (28.0-62.0)  fl


 


RDW Coeff of Rai     (11.0-15.0)  %


 


Plt Count     (150-400)  K/uL


 


MPV     (7.40-12.00)  fL


 


Add Manual Diff     


 


Neutrophils % (Manual)     (48.0-80.0)  %


 


Band Neutrophils %     %


 


Lymphocytes % (Manual)     (16.0-40.0)  %


 


Monocytes % (Manual)     (0.0-15.0)  %


 


Nucleated RBC %     /100WBC


 


Absolute Seg Neuts     (1.4-5.7)  


 


Band Neutrophils #     


 


Lymphocytes # (Manual)     (0.6-2.4)  


 


Monocytes # (Manual)     (0.0-0.8)  


 


Nucleated RBCs #     K/uL


 


VBG pH     (7.31-7.41)  


 


VBG pCO2     (35-45)  mmHG


 


VBG pO2     (30-40)  mmHG


 


VBG HCO3     (22-30)  mEq/L


 


VBG Total CO2     (41-51)  mmol/L


 


VBG Base Excess     (-3.0-3.0)  


 


Lactate  1.8    (0.20-2.00)  mmol/L


 


Sodium     (136-145)  mmol/L


 


Potassium     (3.5-5.1)  mmol/L


 


Chloride     ()  mmol/L


 


Carbon Dioxide     (21.0-32.0)  mmol/L


 


BUN     (7.0-18.0)  mg/dL


 


Creatinine     (0.6-1.0)  mg/dL


 


Est Cr Clr Drug Dosing     mL/min


 


Estimated GFR (MDRD)     ml/min


 


Glucose     ()  mg/dL


 


Calcium     (8.5-10.1)  mg/dL


 


Total Bilirubin     (0.2-1.0)  mg/dL


 


AST     (15-37)  IU/L


 


ALT     (14-63)  IU/L


 


Alkaline Phosphatase     ()  U/L


 


Troponin I     (0.000-0.056)  ng/mL


 


C-Reactive Protein    14.30 H  (0.00-0.90)  mg/dL


 


B-Natriuretic Peptide   474 H   (<100)  PG/ML


 


Total Protein     (6.4-8.2)  g/dL


 


Albumin     (3.4-5.0)  g/dL


 


Globulin     (2.6-4.0)  g/dL


 


Albumin/Globulin Ratio     (0.9-1.6)  


 


TSH 3rd Generation     (0.36-3.74)  uIU/mL


 


Urine Color     


 


Urine Appearance     


 


Urine pH     (5.0-8.0)  


 


Ur Specific Gravity     (1.001-1.035)  


 


Urine Protein     (NEGATIVE)  mg/dL


 


Urine Glucose (UA)     (NEGATIVE)  mg/dL


 


Urine Ketones     (NEGATIVE)  mg/dL


 


Urine Occult Blood     (NEGATIVE)  


 


Urine Nitrite     (NEGATIVE)  


 


Urine Bilirubin     (NEGATIVE)  


 


Urine Urobilinogen     (<2.0)  EU/dL


 


Ur Leukocyte Esterase     (NEGATIVE)  


 


Urine RBC     (0-2/HPF)  


 


Urine WBC     (0-5/HPF)  


 


Ur Epithelial Cells     (NONE-FEW)  


 


Urine Bacteria     (NEGATIVE)  














  02/18/20 02/18/20 02/18/20 Range/Units





  16:39 16:39 16:39 


 


WBC     (4.0-11.0)  K/uL


 


RBC     (4.30-5.90)  M/uL


 


Hgb     (12.0-16.0)  g/dL


 


Hct     (36.0-46.0)  %


 


MCV     (80.0-98.0)  fL


 


MCH     (27.0-32.0)  pg


 


MCHC     (31.0-37.0)  g/dL


 


RDW Std Deviation     (28.0-62.0)  fl


 


RDW Coeff of Rai     (11.0-15.0)  %


 


Plt Count     (150-400)  K/uL


 


MPV     (7.40-12.00)  fL


 


Add Manual Diff     


 


Neutrophils % (Manual)     (48.0-80.0)  %


 


Band Neutrophils %     %


 


Lymphocytes % (Manual)     (16.0-40.0)  %


 


Monocytes % (Manual)     (0.0-15.0)  %


 


Nucleated RBC %     /100WBC


 


Absolute Seg Neuts     (1.4-5.7)  


 


Band Neutrophils #     


 


Lymphocytes # (Manual)     (0.6-2.4)  


 


Monocytes # (Manual)     (0.0-0.8)  


 


Nucleated RBCs #     K/uL


 


VBG pH  7.36    (7.31-7.41)  


 


VBG pCO2  37    (35-45)  mmHG


 


VBG pO2  40    (30-40)  mmHG


 


VBG HCO3  21 L    (22-30)  mEq/L


 


VBG Total CO2  20 L    (41-51)  mmol/L


 


VBG Base Excess  -4.4 L    (-3.0-3.0)  


 


Lactate   1.1   (0.20-2.00)  mmol/L


 


Sodium    140  (136-145)  mmol/L


 


Potassium    4.2  (3.5-5.1)  mmol/L


 


Chloride    107  ()  mmol/L


 


Carbon Dioxide    22.9  (21.0-32.0)  mmol/L


 


BUN    33 H  (7.0-18.0)  mg/dL


 


Creatinine    1.4 H  (0.6-1.0)  mg/dL


 


Est Cr Clr Drug Dosing    21.45  mL/min


 


Estimated GFR (MDRD)    35.9  ml/min


 


Glucose    100  ()  mg/dL


 


Calcium    8.1 L  (8.5-10.1)  mg/dL


 


Total Bilirubin     (0.2-1.0)  mg/dL


 


AST     (15-37)  IU/L


 


ALT     (14-63)  IU/L


 


Alkaline Phosphatase     ()  U/L


 


Troponin I     (0.000-0.056)  ng/mL


 


C-Reactive Protein     (0.00-0.90)  mg/dL


 


B-Natriuretic Peptide     (<100)  PG/ML


 


Total Protein     (6.4-8.2)  g/dL


 


Albumin     (3.4-5.0)  g/dL


 


Globulin     (2.6-4.0)  g/dL


 


Albumin/Globulin Ratio     (0.9-1.6)  


 


TSH 3rd Generation     (0.36-3.74)  uIU/mL


 


Urine Color     


 


Urine Appearance     


 


Urine pH     (5.0-8.0)  


 


Ur Specific Gravity     (1.001-1.035)  


 


Urine Protein     (NEGATIVE)  mg/dL


 


Urine Glucose (UA)     (NEGATIVE)  mg/dL


 


Urine Ketones     (NEGATIVE)  mg/dL


 


Urine Occult Blood     (NEGATIVE)  


 


Urine Nitrite     (NEGATIVE)  


 


Urine Bilirubin     (NEGATIVE)  


 


Urine Urobilinogen     (<2.0)  EU/dL


 


Ur Leukocyte Esterase     (NEGATIVE)  


 


Urine RBC     (0-2/HPF)  


 


Urine WBC     (0-5/HPF)  


 


Ur Epithelial Cells     (NONE-FEW)  


 


Urine Bacteria     (NEGATIVE)  














  02/18/20 02/18/20 Range/Units





  16:39 16:39 


 


WBC    (4.0-11.0)  K/uL


 


RBC    (4.30-5.90)  M/uL


 


Hgb    (12.0-16.0)  g/dL


 


Hct    (36.0-46.0)  %


 


MCV    (80.0-98.0)  fL


 


MCH    (27.0-32.0)  pg


 


MCHC    (31.0-37.0)  g/dL


 


RDW Std Deviation    (28.0-62.0)  fl


 


RDW Coeff of Rai    (11.0-15.0)  %


 


Plt Count    (150-400)  K/uL


 


MPV    (7.40-12.00)  fL


 


Add Manual Diff    


 


Neutrophils % (Manual)    (48.0-80.0)  %


 


Band Neutrophils %    %


 


Lymphocytes % (Manual)    (16.0-40.0)  %


 


Monocytes % (Manual)    (0.0-15.0)  %


 


Nucleated RBC %    /100WBC


 


Absolute Seg Neuts    (1.4-5.7)  


 


Band Neutrophils #    


 


Lymphocytes # (Manual)    (0.6-2.4)  


 


Monocytes # (Manual)    (0.0-0.8)  


 


Nucleated RBCs #    K/uL


 


VBG pH    (7.31-7.41)  


 


VBG pCO2    (35-45)  mmHG


 


VBG pO2    (30-40)  mmHG


 


VBG HCO3    (22-30)  mEq/L


 


VBG Total CO2    (41-51)  mmol/L


 


VBG Base Excess    (-3.0-3.0)  


 


Lactate    (0.20-2.00)  mmol/L


 


Sodium    (136-145)  mmol/L


 


Potassium    (3.5-5.1)  mmol/L


 


Chloride    ()  mmol/L


 


Carbon Dioxide    (21.0-32.0)  mmol/L


 


BUN    (7.0-18.0)  mg/dL


 


Creatinine    (0.6-1.0)  mg/dL


 


Est Cr Clr Drug Dosing    mL/min


 


Estimated GFR (MDRD)    ml/min


 


Glucose    ()  mg/dL


 


Calcium    (8.5-10.1)  mg/dL


 


Total Bilirubin    (0.2-1.0)  mg/dL


 


AST    (15-37)  IU/L


 


ALT    (14-63)  IU/L


 


Alkaline Phosphatase    ()  U/L


 


Troponin I  0.085 H*   (0.000-0.056)  ng/mL


 


C-Reactive Protein    (0.00-0.90)  mg/dL


 


B-Natriuretic Peptide    (<100)  PG/ML


 


Total Protein    (6.4-8.2)  g/dL


 


Albumin    (3.4-5.0)  g/dL


 


Globulin    (2.6-4.0)  g/dL


 


Albumin/Globulin Ratio    (0.9-1.6)  


 


TSH 3rd Generation   9.26 H  (0.36-3.74)  uIU/mL


 


Urine Color    


 


Urine Appearance    


 


Urine pH    (5.0-8.0)  


 


Ur Specific Gravity    (1.001-1.035)  


 


Urine Protein    (NEGATIVE)  mg/dL


 


Urine Glucose (UA)    (NEGATIVE)  mg/dL


 


Urine Ketones    (NEGATIVE)  mg/dL


 


Urine Occult Blood    (NEGATIVE)  


 


Urine Nitrite    (NEGATIVE)  


 


Urine Bilirubin    (NEGATIVE)  


 


Urine Urobilinogen    (<2.0)  EU/dL


 


Ur Leukocyte Esterase    (NEGATIVE)  


 


Urine RBC    (0-2/HPF)  


 


Urine WBC    (0-5/HPF)  


 


Ur Epithelial Cells    (NONE-FEW)  


 


Urine Bacteria    (NEGATIVE)  











Meds: 


Medications











Generic Name Dose Route Start Last Admin





  Trade Name Freq  PRN Reason Stop Dose Admin


 


Acetaminophen  650 mg  02/18/20 17:46  02/19/20 05:37





  Tylenol  PO   650 mg





  Q4H PRN   Administration





  Pain (Mild 1-3)/fever   





     





     





     


 


Docusate Sodium  100 mg  02/18/20 17:46  





  Colace  PO   





  BID PRN   





  Constipation   





     





     





     


 


Hydrocortisone Sodium Succinate  100 mg  02/19/20 11:00  





  Solu-Cortef  IVPUSH   





  Q8H HANSA   





     





     





     





     


 


Norepinephrine Bitartrate  4 mg in 250 mls @ 7.5 mls/hr  02/18/20 14:00  02/19/ 20 06:52





  Norepinephr-0.9% Nacl 4 Mg/250  IV   12 mcg/min





  TITRATE HANSA   45 mls/hr





     Titration





     





  Protocol   





  2 MCG/MIN   


 


Sodium Chloride  1,000 mls @ 125 mls/hr  02/18/20 18:45  02/19/20 04:49





  Normal Saline  IV   125 mls/hr





  CONTINUOUS HANSA   Administration





     





     





     





     


 


Pantoprazole Sodium 40 mg/  10 mls @ 300 mls/hr  02/18/20 19:15  02/18/20 20:12





  Sodium Chloride  IV   300 mls/hr





  DAILY HANSA   Administration





     





     





     





     


 


Ertapenem 1 gm/ Sodium  50 mls @ 100 mls/hr  02/18/20 20:00  02/18/20 20:15





  Chloride  IV   100 mls/hr





  Q24H HANSA   Administration





     





     





     





     


 


Vancomycin HCl 1 gm/ Sodium  250 mls @ 166 mls/hr  02/18/20 21:00  02/18/20 20:

45





  Chloride  IV   166 mls/hr





  Q24H HANSA   Administration





     





     





     





     


 


Vasopressin 100 units/ Sodium  100 mls @ 1.8 mls/hr  02/18/20 22:15  02/18/20 22

:40





  Chloride  IV   0.03 units/min





  CONTINUOUS HANSA   1.8 mls/hr





     Administration





     





     





  0.03 UNITS/MIN   


 


Magnesium Sulfate 4 gm/ Premix  100 mls @ 50 mls/hr  02/19/20 06:38  02/19/20 06

:48





  IV  02/19/20 08:37  50 mls/hr





  ONETIME ONE   Administration





     





     





     





     


 


Levothyroxine Sodium  75 mcg  02/19/20 07:30  02/19/20 06:36





  Levothyroxine  PO   75 mcg





  ACBREAKFAST HANSA   Administration





     





     





     





     


 


Ondansetron HCl  4 mg  02/18/20 17:46  





  Zofran Odt  PO   





  Q4H PRN   





  nausea, able to take PO   





     





     





     


 


Ondansetron HCl  4 mg  02/18/20 17:46  





  Zofran  IVPUSH   





  Q4H PRN   





  Nausea   





     





     





     


 


Polyethylene Glycol  17 gm  02/18/20 17:46  





  Miralax  PO   





  DAILY PRN   





  Constipation   





     





     





     


 


Rivaroxaban  10 mg  02/19/20 09:00  





  Xarelto  PO   





  DAILY HANSA   





     





     





     





     


 


Sodium Chloride  10 ml  02/18/20 12:52  





  Saline Flush  FLUSH   





  ASDIRECTED PRN   





  Keep Vein Open   





     





     





     


 


Sodium Chloride  2.5 ml  02/18/20 12:52  





  Saline Flush  FLUSH   





  ASDIRECTED PRN   





  Keep Vein Open   





     





     





     


 


Tramadol HCl  100 mg  02/18/20 20:56  02/19/20 05:36





  Ultram  PO   100 mg





  Q8H PRN   Administration





  Pain   





     





     





     


 


Vancomycin HCl  1 dose  02/18/20 20:00  





  Pharmacy To Dose - Vancomycin  .XX   





  ASDIRECTED HANSA   





     





     





     





     














Discontinued Medications














Generic Name Dose Route Start Last Admin





  Trade Name Freq  PRN Reason Stop Dose Admin


 


Aspirin  325 mg  02/18/20 17:22  02/18/20 17:45





  Aspirin  PO  02/18/20 17:23  325 mg





  ONETIME ONE   Administration





     





     





     





     


 


Enoxaparin Sodium  40 mg  02/18/20 18:00  02/18/20 21:02





  Lovenox  SUBCUT   Not Given





  Q24H HANSA   





     





     





     





     


 


Fentanyl  25 mcg  02/18/20 22:22  02/19/20 03:30





  Sublimaze  IVPUSH   25 mcg





  Q4HR PRN   Administration





  Severe pain   





     





     





     


 


Fentanyl  25 mcg  02/19/20 01:39  02/19/20 02:01





  Fentanyl  IVPUSH  02/19/20 01:40  Not Given





  ONETIME ONE   





     





     





     





     


 


Fentanyl  25 mcg  02/19/20 02:00  02/19/20 02:01





  Sublimaze  IVPUSH  02/19/20 02:01  25 mcg





  ONETIME ONE   Administration





     





     





     





     


 


Hydrocortisone Sodium Succinate  100 mg  02/19/20 03:30  02/19/20 03:23





  Solu-Cortef  IVPUSH  02/19/20 03:31  100 mg





  ONETIME ONE   Administration





     





     





     





     


 


Sodium Chloride  1,000 mls @ 999 mls/hr  02/18/20 13:18  02/18/20 14:06





  Normal Saline  IV  02/18/20 14:18  999 mls/hr





  .Bolus ONE   Administration





     





     





     





     


 


Sodium Chloride  1,000 mls @ 999 mls/hr  02/18/20 13:30  02/18/20 14:06





  Normal Saline  IV  02/18/20 14:30  999 mls/hr





  .Bolus ONE   Administration





     





     





     





     


 


Meropenem 1 gm/ Sodium  100 mls @ 200 mls/hr  02/18/20 15:10  02/18/20 17:30





  Chloride  IV  02/18/20 15:39  Not Given





  ONETIME ONE   





     





     





     





     


 


Sodium Chloride  1,000 mls @ 999 mls/hr  02/18/20 16:45  02/18/20 17:07





  Normal Saline  IV  02/18/20 17:45  999 mls/hr





  .Bolus ONE   Administration





     





     





     





     


 


Levofloxacin/Dextrose 750 mg/  150 mls @ 100 mls/hr  02/18/20 17:07  02/18/20 17

:46





  Premix  IV  02/18/20 18:36  100 mls/hr





  ONETIME ONE   Administration





     





     





     





     


 


Piperacillin Sod/Tazobactam  50 mls @ 100 mls/hr  02/18/20 18:15  





  Sod 3.375 gm/ Sodium Chloride  IV   





  Q8H HANSA   





     





     





     





     


 


Sodium Chloride  1,000 mls @ 999 mls/hr  02/18/20 19:24  02/18/20 19:27





  Normal Saline  IV  02/18/20 20:24  999 mls/hr





  .Bolus ONE   Administration





     





     





     





     


 


Vasopressin 100 units/ Sodium  100 mls @ 1.8 mls/hr  02/18/20 21:45  





  Chloride  IV   





  TITRATE HANSA   





     





     





     





  0.03 UNITS/MIN   


 


Ibuprofen  800 mg  02/18/20 17:46  





  Motrin  PO   





  Q6H PRN   





  Pain (mild 1-3)   





     





     





     


 


Ketorolac Tromethamine  15 mg  02/18/20 17:46  





  Toradol  IV   





  Q6H PRN   





  Pain (moderate 4-6)   





     





     





     


 


Levothyroxine Sodium  75 mcg  02/19/20 07:30  





  Synthroid  PO   





  ACBREAKFAST HANSA   





     





     





     





     


 


Lidocaine HCl  Confirm  02/18/20 21:43  02/18/20 22:15





  Xylocaine 1%  Administered  02/18/20 21:44  20 ml





  Dose   Administration





  20 ml   





  .ROUTE   





  .STK-MED ONE   





     





     





     





     














Departure





- Departure


Time of Disposition: 16:00


Disposition: Admitted As Inpatient 67


Clinical Impression: 


Pneumonia


Qualifiers:


 Pneumonia type: due to unspecified organism Laterality: unspecified laterality 

Lung location: unspecified part of lung Qualified Code(s): J18.9 - Pneumonia, 

unspecified organism








- Discharge Information





Sepsis Event Note





- Evaluation


Sepsis Screening Result: No Definite Risk





- Focused Exam


Date Exam was Performed: 02/19/20


Time Exam was Performed: 07:18





- My Orders


Last 24 Hours: 


My Active Orders





02/18/20 14:00


Norepinephrine Bit/0.9 % NaCl [Norepinephr-0.9% NaCl 4 mg/250] 4 mg in 250 ml 

IV TITRATE 





02/18/20 15:04


EKG Documentation Completion [RC] STAT 














- Assessment/Plan


Last 24 Hours: 


My Active Orders





02/18/20 14:00


Norepinephrine Bit/0.9 % NaCl [Norepinephr-0.9% NaCl 4 mg/250] 4 mg in 250 ml 

IV TITRATE 





02/18/20 15:04


EKG Documentation Completion [RC] STAT

## 2020-02-18 NOTE — PCM.HP.2
H&P History of Present Illness





- General


Date of Service: 02/18/20


Admit Problem/Dx: 


 Admission Diagnosis/Problem





Admission Diagnosis/Problem      Pneumonia











- History of Present Illness


Initial Comments - Free Text/Narative: 





85 y/o female wit history of CREST Syndrome with Raynaud's who presented to the 

ER from [facility] after she was found to be hypotensive and 

lethargic. In the ER, the patient was found to have BP 70/30, WBC 31K and 

started on Norepinephrine for Septic shock. Now, /55 on Norepi.


Patient states that she has not been feeling well for the past couple of days. 

Yesterday, she vomited couple of times after dinner. Denies any fevers, chest 

pain, dyspnea. She does endorse some abdominal pain, rectal pain and leg pains 

bilaterally. 





In the ER, she was started on Norepi, Levaquin and Vancomycin and aggressively 

hydrated with 3 L NS. Initial Lactate of 1.8. 


  ** right leg


Pain Score (Numeric/FACES): 8





- Related Data


Allergies/Adverse Reactions: 


 Allergies











Allergy/AdvReac Type Severity Reaction Status Date / Time


 


codeine Allergy  Vomiting Verified 02/18/20 12:49


 


latex Allergy  Rash Verified 02/18/20 12:49


 


morphine Allergy  Difficulty Verified 02/18/20 12:49





   Breathing  


 


oxycodone HCl [From Percocet] Allergy  Vomiting Verified 02/18/20 12:49


 


Penicillins Allergy  Anaphylactic Verified 02/18/20 12:49





   Shock  


 


Sulfa (Sulfonamide Allergy  Anaphylactic Verified 02/18/20 12:49





Antibiotics)   Shock  


 


all pain meds Allergy  Nausea Uncoded 02/18/20 12:49











Home Medications: 


 Home Meds





Omeprazole Magnesium [Prilosec Otc] 20 mg PO ACBRK 12/30/14 [History]


Levothyroxine [Synthroid] 75 mcg PO ACBREAKFAST 07/28/16 [History]


Cetirizine HCl 10 mg PO BEDTIME 11/01/19 [History]


Loperamide HCl [Imodium A-D] 4 mg PO Q6H PRN 11/01/19 [History]


Oxybutynin Chloride 5 mg PO BID 11/01/19 [History]


bisacodyL [Bisacodyl] 10 mg RC DAILY PRN 11/01/19 [History]


Acetaminophen [Tylenol] 650 mg PO TID PRN 11/02/19 [History]


Docusate Sodium 100 mg PO Q12H 11/02/19 [History]


Patient's Own Medication [Ptom] 1 each PO BID 11/02/19 [History]


traMADol [Ultram] 100 mg PO Q8H PRN 11/02/19 [History]


Calcium Carbonate 200 mg PO DAILY PRN 12/13/19 [History]


Promethazine HCl 12.5 mg PO BID 12/13/19 [History]


Acetaminophen [Tylenol] 650 mg PO Q8H PRN  tablet 12/16/19 [Rx]


Cetirizine [ZyrTEC] 10 mg PO BEDTIME  tablet 12/16/19 [Rx]


Hydrocodone/Acetaminophen [Norco  Tablet] 1 each PO ASDIRECTED 02/18/20 [

History]


Rivaroxaban [Xarelto] 10 mg PO DAILY 02/18/20 [History]


amLODIPine Besylate [Norvasc] 10 mg PO DAILY 02/18/20 [History]











Past Medical History





- Past Health History


Medical/Surgical History: Denies Medical/Surgical History


HEENT History: Reports: Cataract


Other HEENT History: has upper denture and lower partial


Cardiovascular History: Reports: None, High Cholesterol


Other Cardiovascular History: nonrheumatic mitral valve prolapse


Respiratory History: Reports: COPD, Pneumonia, Recurrent


Gastrointestinal History: Reports: GERD, Irritable Bowel Syndrome, Other (See 

Below)


Other Gastrointestinal History: IBS-Diarrhea


Genitourinary History: Reports: Other (See Below)


Other Genitourinary History: overactive bladder.  frequency


OB/GYN History: Reports: Pregnancy


Musculoskeletal History: Reports: Arthritis


Neurological History: Reports: Other (See Below)


Other Neuro History: hx of motion sickness


Psychiatric History: Reports: None


Endocrine/Metabolic History: Reports: Hypothyroidism


Hematologic History: Reports: None


Immunologic History: Reports: Other (See Below)


Other Immunologic History: CREST syndrome


Oncologic (Cancer) History: Reports: None


Dermatologic History: Reports: None, Other (See Below)


Other Dermatologic History: Raynaud's Syndrome





- Infectious Disease History


Infectious Disease History: Reports: Chicken Pox, Measles, Mumps





- Past Surgical History


Head Surgeries/Procedures: Reports: None


HEENT Surgical History: Reports: Cataract Surgery


Female  Surgical History: Reports: Hysterectomy


Musculoskeletal Surgical History: Reports: Knee Replacement, ORIF, Shoulder 

Surgery


Other Musculoskeletal Surgeries/Procedures:: left small fifth toe amputation.  

difficulty walking.  leg painright shoulder pain





Social & Family History





- Family History


Family Medical History: Noncontributory


HEENT: Reports: Cataract


Respiratory: Reports: COPD


Musculoskeletal: Reports: Arthritis


Psychiatric: Reports: Depression


Endocrine/Metabolic: Reports: Diabetes, type II





- Tobacco Use


Smoking Status *Q: Never Smoker





- Caffeine Use


Caffeine Use: Reports: Coffee, Soda, Tea





- Recreational Drug Use


Recreational Drug Use: No





H&P Review of Systems





- Review of Systems:


Review Of Systems: Comprehensive ROS is negative, except as noted in HPI.





Exam





- Exam


Exam: See Below





- Vital Signs


Vital Signs: 


 Last Vital Signs











Temp  36.4 C   02/18/20 16:45


 


Pulse  99   02/18/20 17:11


 


Resp  20   02/18/20 17:11


 


BP  99/51 L  02/18/20 17:11


 


Pulse Ox  96   02/18/20 16:45











Weight: 44.633 kg





- Exam


General: Alert, Oriented, Cooperative


HEENT: Other (dry oral mucosa)


Lungs: Other (lung sounds bilaterally. Rhonchus on left side. No wheezing.)


Cardiovascular: Regular Rhythm, Tachycardia


GI/Abdominal Exam: Other (Tender abdomen all throgh out. No distention.)


Extremities: Normal Inspection, No Pedal Edema


Skin: Other (Left plantar ulcer s/p 4th toe amputation.)


Neuro Extensive - Mental Status: Alert, Oriented x3





- Patient Data


Lab Results Last 24 hrs: 


 Laboratory Results - last 24 hr











  02/18/20 02/18/20 02/18/20 Range/Units





  12:55 13:33 13:33 


 


WBC   31.73 H   (4.0-11.0)  K/uL


 


RBC   3.18 L   (4.30-5.90)  M/uL


 


Hgb   9.6 L   (12.0-16.0)  g/dL


 


Hct   29.7 L   (36.0-46.0)  %


 


MCV   93.4   (80.0-98.0)  fL


 


MCH   30.2   (27.0-32.0)  pg


 


MCHC   32.3   (31.0-37.0)  g/dL


 


RDW Std Deviation   50.6   (28.0-62.0)  fl


 


RDW Coeff of Rai   15   (11.0-15.0)  %


 


Plt Count   271   (150-400)  K/uL


 


MPV   9.60   (7.40-12.00)  fL


 


Add Manual Diff   YES   


 


Neutrophils % (Manual)   83 H   (48.0-80.0)  %


 


Band Neutrophils %   10   %


 


Lymphocytes % (Manual)   4 L   (16.0-40.0)  %


 


Monocytes % (Manual)   3   (0.0-15.0)  %


 


Nucleated RBC %   0.0   /100WBC


 


Absolute Seg Neuts   26.3 H   (1.4-5.7)  


 


Band Neutrophils #   3.2   


 


Lymphocytes # (Manual)   1.3   (0.6-2.4)  


 


Monocytes # (Manual)   1.0 H   (0.0-0.8)  


 


Nucleated RBCs #   0   K/uL


 


VBG pH     (7.31-7.41)  


 


VBG pCO2     (35-45)  mmHG


 


VBG pO2     (30-40)  mmHG


 


VBG HCO3     (22-30)  mEq/L


 


VBG Total CO2     (41-51)  mmol/L


 


VBG Base Excess     (-3.0-3.0)  


 


Lactate     (0.20-2.00)  mmol/L


 


Sodium    139  (136-145)  mmol/L


 


Potassium    4.5  (3.5-5.1)  mmol/L


 


Chloride    106  ()  mmol/L


 


Carbon Dioxide    21.7  (21.0-32.0)  mmol/L


 


BUN    35 H  (7.0-18.0)  mg/dL


 


Creatinine    1.8 H  (0.6-1.0)  mg/dL


 


Est Cr Clr Drug Dosing    16.69  mL/min


 


Estimated GFR (MDRD)    26.9  ml/min


 


Glucose    98  ()  mg/dL


 


Calcium    8.7  (8.5-10.1)  mg/dL


 


Total Bilirubin    0.5  (0.2-1.0)  mg/dL


 


AST    22  (15-37)  IU/L


 


ALT    5 L  (14-63)  IU/L


 


Alkaline Phosphatase    74  ()  U/L


 


Troponin I    0.059 H*  (0.000-0.056)  ng/mL


 


B-Natriuretic Peptide     (<100)  PG/ML


 


Total Protein    5.3 L  (6.4-8.2)  g/dL


 


Albumin    2.1 L  (3.4-5.0)  g/dL


 


Globulin    3.2  (2.6-4.0)  g/dL


 


Albumin/Globulin Ratio    0.7 L  (0.9-1.6)  


 


Urine Color  YELLOW    


 


Urine Appearance  SLT CLOUDY    


 


Urine pH  5.5    (5.0-8.0)  


 


Ur Specific Gravity  1.025    (1.001-1.035)  


 


Urine Protein  TRACE H    (NEGATIVE)  mg/dL


 


Urine Glucose (UA)  NEGATIVE    (NEGATIVE)  mg/dL


 


Urine Ketones  NEGATIVE    (NEGATIVE)  mg/dL


 


Urine Occult Blood  SMALL H    (NEGATIVE)  


 


Urine Nitrite  NEGATIVE    (NEGATIVE)  


 


Urine Bilirubin  NEGATIVE    (NEGATIVE)  


 


Urine Urobilinogen  0.2    (<2.0)  EU/dL


 


Ur Leukocyte Esterase  MODERATE H    (NEGATIVE)  


 


Urine RBC  0-4    (0-2/HPF)  


 


Urine WBC  20-30    (0-5/HPF)  


 


Ur Epithelial Cells  RARE    (NONE-FEW)  


 


Urine Bacteria  3+ H    (NEGATIVE)  














  02/18/20 02/18/20 02/18/20 Range/Units





  13:33 13:33 16:39 


 


WBC     (4.0-11.0)  K/uL


 


RBC     (4.30-5.90)  M/uL


 


Hgb     (12.0-16.0)  g/dL


 


Hct     (36.0-46.0)  %


 


MCV     (80.0-98.0)  fL


 


MCH     (27.0-32.0)  pg


 


MCHC     (31.0-37.0)  g/dL


 


RDW Std Deviation     (28.0-62.0)  fl


 


RDW Coeff of Rai     (11.0-15.0)  %


 


Plt Count     (150-400)  K/uL


 


MPV     (7.40-12.00)  fL


 


Add Manual Diff     


 


Neutrophils % (Manual)     (48.0-80.0)  %


 


Band Neutrophils %     %


 


Lymphocytes % (Manual)     (16.0-40.0)  %


 


Monocytes % (Manual)     (0.0-15.0)  %


 


Nucleated RBC %     /100WBC


 


Absolute Seg Neuts     (1.4-5.7)  


 


Band Neutrophils #     


 


Lymphocytes # (Manual)     (0.6-2.4)  


 


Monocytes # (Manual)     (0.0-0.8)  


 


Nucleated RBCs #     K/uL


 


VBG pH    7.36  (7.31-7.41)  


 


VBG pCO2    37  (35-45)  mmHG


 


VBG pO2    40  (30-40)  mmHG


 


VBG HCO3    21 L  (22-30)  mEq/L


 


VBG Total CO2    20 L  (41-51)  mmol/L


 


VBG Base Excess    -4.4 L  (-3.0-3.0)  


 


Lactate  1.8    (0.20-2.00)  mmol/L


 


Sodium     (136-145)  mmol/L


 


Potassium     (3.5-5.1)  mmol/L


 


Chloride     ()  mmol/L


 


Carbon Dioxide     (21.0-32.0)  mmol/L


 


BUN     (7.0-18.0)  mg/dL


 


Creatinine     (0.6-1.0)  mg/dL


 


Est Cr Clr Drug Dosing     mL/min


 


Estimated GFR (MDRD)     ml/min


 


Glucose     ()  mg/dL


 


Calcium     (8.5-10.1)  mg/dL


 


Total Bilirubin     (0.2-1.0)  mg/dL


 


AST     (15-37)  IU/L


 


ALT     (14-63)  IU/L


 


Alkaline Phosphatase     ()  U/L


 


Troponin I     (0.000-0.056)  ng/mL


 


B-Natriuretic Peptide   474 H   (<100)  PG/ML


 


Total Protein     (6.4-8.2)  g/dL


 


Albumin     (3.4-5.0)  g/dL


 


Globulin     (2.6-4.0)  g/dL


 


Albumin/Globulin Ratio     (0.9-1.6)  


 


Urine Color     


 


Urine Appearance     


 


Urine pH     (5.0-8.0)  


 


Ur Specific Gravity     (1.001-1.035)  


 


Urine Protein     (NEGATIVE)  mg/dL


 


Urine Glucose (UA)     (NEGATIVE)  mg/dL


 


Urine Ketones     (NEGATIVE)  mg/dL


 


Urine Occult Blood     (NEGATIVE)  


 


Urine Nitrite     (NEGATIVE)  


 


Urine Bilirubin     (NEGATIVE)  


 


Urine Urobilinogen     (<2.0)  EU/dL


 


Ur Leukocyte Esterase     (NEGATIVE)  


 


Urine RBC     (0-2/HPF)  


 


Urine WBC     (0-5/HPF)  


 


Ur Epithelial Cells     (NONE-FEW)  


 


Urine Bacteria     (NEGATIVE)  














  02/18/20 02/18/20 02/18/20 Range/Units





  16:39 16:39 16:39 


 


WBC     (4.0-11.0)  K/uL


 


RBC     (4.30-5.90)  M/uL


 


Hgb     (12.0-16.0)  g/dL


 


Hct     (36.0-46.0)  %


 


MCV     (80.0-98.0)  fL


 


MCH     (27.0-32.0)  pg


 


MCHC     (31.0-37.0)  g/dL


 


RDW Std Deviation     (28.0-62.0)  fl


 


RDW Coeff of Rai     (11.0-15.0)  %


 


Plt Count     (150-400)  K/uL


 


MPV     (7.40-12.00)  fL


 


Add Manual Diff     


 


Neutrophils % (Manual)     (48.0-80.0)  %


 


Band Neutrophils %     %


 


Lymphocytes % (Manual)     (16.0-40.0)  %


 


Monocytes % (Manual)     (0.0-15.0)  %


 


Nucleated RBC %     /100WBC


 


Absolute Seg Neuts     (1.4-5.7)  


 


Band Neutrophils #     


 


Lymphocytes # (Manual)     (0.6-2.4)  


 


Monocytes # (Manual)     (0.0-0.8)  


 


Nucleated RBCs #     K/uL


 


VBG pH     (7.31-7.41)  


 


VBG pCO2     (35-45)  mmHG


 


VBG pO2     (30-40)  mmHG


 


VBG HCO3     (22-30)  mEq/L


 


VBG Total CO2     (41-51)  mmol/L


 


VBG Base Excess     (-3.0-3.0)  


 


Lactate  1.1    (0.20-2.00)  mmol/L


 


Sodium   140   (136-145)  mmol/L


 


Potassium   4.2   (3.5-5.1)  mmol/L


 


Chloride   107   ()  mmol/L


 


Carbon Dioxide   22.9   (21.0-32.0)  mmol/L


 


BUN   33 H   (7.0-18.0)  mg/dL


 


Creatinine   1.4 H   (0.6-1.0)  mg/dL


 


Est Cr Clr Drug Dosing   21.45   mL/min


 


Estimated GFR (MDRD)   35.9   ml/min


 


Glucose   100   ()  mg/dL


 


Calcium   8.1 L   (8.5-10.1)  mg/dL


 


Total Bilirubin     (0.2-1.0)  mg/dL


 


AST     (15-37)  IU/L


 


ALT     (14-63)  IU/L


 


Alkaline Phosphatase     ()  U/L


 


Troponin I    0.085 H*  (0.000-0.056)  ng/mL


 


B-Natriuretic Peptide     (<100)  PG/ML


 


Total Protein     (6.4-8.2)  g/dL


 


Albumin     (3.4-5.0)  g/dL


 


Globulin     (2.6-4.0)  g/dL


 


Albumin/Globulin Ratio     (0.9-1.6)  


 


Urine Color     


 


Urine Appearance     


 


Urine pH     (5.0-8.0)  


 


Ur Specific Gravity     (1.001-1.035)  


 


Urine Protein     (NEGATIVE)  mg/dL


 


Urine Glucose (UA)     (NEGATIVE)  mg/dL


 


Urine Ketones     (NEGATIVE)  mg/dL


 


Urine Occult Blood     (NEGATIVE)  


 


Urine Nitrite     (NEGATIVE)  


 


Urine Bilirubin     (NEGATIVE)  


 


Urine Urobilinogen     (<2.0)  EU/dL


 


Ur Leukocyte Esterase     (NEGATIVE)  


 


Urine RBC     (0-2/HPF)  


 


Urine WBC     (0-5/HPF)  


 


Ur Epithelial Cells     (NONE-FEW)  


 


Urine Bacteria     (NEGATIVE)  











Result Diagrams: 


 02/18/20 13:33





 02/18/20 16:39





Sepsis Event Note





- Evaluation


Sepsis Screening Result: No Definite Risk





- Focused Exam


Vital Signs: 


 Vital Signs











  Temp Pulse Resp BP Pulse Ox


 


 02/18/20 17:11   99  20  99/51 L 


 


 02/18/20 16:45  36.4 C  99  20  74/38 L  96


 


 02/18/20 14:30   92   72/37 L  97


 


 02/18/20 14:23   91   80/41 L  100


 


 02/18/20 13:16   99  20  76/46 L  97


 


 02/18/20 13:14   99   76/46 L  97


 


 02/18/20 13:03   100   78/45 L 


 


 02/18/20 12:41  36.5 C  112 H  20  97/45 L  92 L


 


 02/18/20 12:35   35 L   74/36 L  83 L











Date Exam was Performed: 02/18/20


Time Exam was Performed: 19:05


Problem List Initiated/Reviewed/Updated: Yes


Orders Last 24hrs: 


 Active Orders 24 hr











 Category Date Time Status


 


 Admission Status [Patient Status] [ADT] Stat ADT  02/18/20 17:24 Active


 


 Bedrest Bedside Commode [RC] ASDIRECTED Care  02/18/20 17:46 Active


 


 EKG 12 Lead [EKG Documentation Completion] [RC] ROUTINE Care  02/18/20 12:48 

Active


 


 EKG 12 Lead [EKG Documentation Completion] [RC] STAT Care  02/18/20 15:05 

Active


 


 EKG Documentation Completion [RC] STAT Care  02/18/20 15:04 Inactive


 


 Oxygen Therapy [RC] PRN Care  02/18/20 17:46 Active


 


 Telemetry Monitoring [Cardiac Monitoring] [RC] .AS Care  02/18/20 17:56 Active





 DIRECTED   


 


 VTE/DVT Education [RC] PER UNIT ROUTINE Care  02/18/20 17:46 Active


 


 Vital Signs [RC] Q4H Care  02/18/20 17:46 Active


 


 Regular Diet [DIET] Diet  02/18/20 Dinner Active


 


 Abdomen Pelvis wo Cont [CT] Stat Exams  02/18/20 17:57 Ordered


 


 CTA Chest W WO Contrast [Ang Chest] [CT] Stat Exams  02/18/20 16:30 Ordered


 


 C-REACTIVE PROTEIN [CHEM] Stat Lab  02/18/20 18:06 Ordered


 


 CULTURE BLOOD [BC] Stat Lab  02/18/20 18:05 Ordered


 


 CULTURE BLOOD [BC] Stat Lab  02/18/20 18:05 Ordered


 


 CULTURE URINE [RM] Stat Lab  02/18/20 12:55 Received


 


 SEDIMENTATION RATE AUTO [HEME] Stat Lab  02/18/20 18:06 Ordered


 


 Acetaminophen [Tylenol] Med  02/18/20 17:46 Active





 650 mg PO Q4H PRN   


 


 Docusate Sodium [Colace] Med  02/18/20 17:46 Active





 100 mg PO BID PRN   


 


 Enoxaparin [Lovenox] Med  02/18/20 18:00 Active





 40 mg SUBCUT Q24H   


 


 Ertapenem [INVanz] 1 gm Med  02/18/20 18:15 Active





 Sodium Chloride 0.9% [Normal Saline] 50 ml   





 IV Q24H   


 


 Ketorolac [Toradol] Med  02/18/20 17:46 Active





 15 mg IV Q6H PRN   


 


 Levothyroxine [Synthroid] Med  02/19/20 07:30 Active





 75 mcg PO ACBREAKFAST   


 


 Norepinephrine Bit/0.9 % NaCl [Norepinephr-0.9% NaCl 4 Med  02/18/20 14:00 

Active





 mg/250]   





 4 mg in 250 ml IV TITRATE   


 


 Ondansetron [Zofran ODT] Med  02/18/20 17:46 Active





 4 mg PO Q4H PRN   


 


 Ondansetron [Zofran] Med  02/18/20 17:46 Active





 4 mg IVPUSH Q4H PRN   


 


 Rivaroxaban [Xarelto] Med  02/19/20 09:00 Active





 10 mg PO DAILY   


 


 Sodium Chloride 0.9% [Saline Flush] Med  02/18/20 12:52 Active





 10 ml FLUSH ASDIRECTED PRN   


 


 Sodium Chloride 0.9% [Saline Flush] Med  02/18/20 12:52 Active





 2.5 ml FLUSH ASDIRECTED PRN   


 


 Vancomycin 0.75 gm Med  02/18/20 17:09 Active





 Sodium Chloride 0.9% [Normal Saline] 250 ml   





 IV ONETIME   


 


 polyethylene glycoL 3350 [MiraLAX] Med  02/18/20 17:46 Active





 17 gm PO DAILY PRN   


 


 Blood Culture x2 Reflex Set [OM.PC] Stat Oth  02/18/20 18:05 Ordered


 


 Saline Lock Insert [OM.PC] Stat Oth  02/18/20 12:52 Ordered


 


 Resuscitation Status Routine Resus Stat  02/18/20 17:46 Ordered








 Medication Orders





Acetaminophen (Tylenol)  650 mg PO Q4H PRN


   PRN Reason: Pain (Mild 1-3)/fever


Docusate Sodium (Colace)  100 mg PO BID PRN


   PRN Reason: Constipation


Enoxaparin Sodium (Lovenox)  40 mg SUBCUT Q24H [facility]


Norepinephrine Bitartrate (Norepinephr-0.9% Nacl 4 Mg/250)  4 mg in 250 mls @ 

7.5 mls/hr IV TITRATE HANSA; Protocol


   Last Titration: 02/18/20 15:05  Dose: 12 mcg/min, 45 mls/hr


   Titration: 02/18/20 14:30  Dose: 8 mcg/min, 30 mls/hr


   Admin: 02/18/20 14:06  Dose: 2 mcg/min, 7.5 mls/hr


Vancomycin HCl 0.75 gm/ Sodium (Chloride)  250 mls @ 166 mls/hr IV ONETIME ONE


   Stop: 02/18/20 18:39


Ertapenem 1 gm/ Sodium (Chloride)  50 mls @ 100 mls/hr IV Q24H [facility]


Ketorolac Tromethamine (Toradol)  15 mg IV Q6H PRN


   PRN Reason: Pain (moderate 4-6)


Levothyroxine Sodium (Synthroid)  75 mcg PO ACBREAKFAST [facility]


Ondansetron HCl (Zofran Odt)  4 mg PO Q4H PRN


   PRN Reason: nausea, able to take PO


Ondansetron HCl (Zofran)  4 mg IVPUSH Q4H PRN


   PRN Reason: Nausea


Polyethylene Glycol (Miralax)  17 gm PO DAILY PRN


   PRN Reason: Constipation


Rivaroxaban (Xarelto)  10 mg PO DAILY HANSA


Sodium Chloride (Saline Flush)  10 ml FLUSH ASDIRECTED PRN


   PRN Reason: Keep Vein Open


Sodium Chloride (Saline Flush)  2.5 ml FLUSH ASDIRECTED PRN


   PRN Reason: Keep Vein Open








Assessment/Plan Comment:: 





A:


1. Septic shock on Norepinephrine


2. Community acquired pneumonia


3. Urinary tract infection


4. Acute kidney injury


5. Elevated troponin


6. PMH CREST Syndrome, chronic pain, AFib





P:


1. Septic shock- will continue with Norepinephrine drip. Continue fluids at 125 

ml/hr NS. Vancomycin and Ertapenem for now since multiple sources of infection. 

Ordered blood cultures. Will get CT abdomen/pelvis to rule out any GI 

pathology. trend troponins x2. Likely secondary to acute infection and MARY. 





dispo: 2-3 days

## 2020-02-18 NOTE — CT
Head CT

 

Technique: Multiple axial sections through the brain were obtained.  

Intravenous contrast was not utilized.

 

Comparison: No prior intracranial imaging is available.

 

Findings: Ventricles along with basal cisterns and sulci over the 

convexities are moderately prominent.  Diminished density is noted 

within portions of the periventricular white matter compatible with 

small vessel ischemic demyelination change.  No other abnormal 

parenchymal densities are seen.  No evidence of intracranial 

hemorrhage.  No midline shift or mass effect is seen.

 

Bone window settings were reviewed.  No acute calvarial abnormality is

 seen.  Mild mucosal thickening is seen within the right maxillary 

sinus which is likely chronic.  No acute change within the visualized 

mastoid sinuses.

 

Impression:

1.  Senescent change as noted above.

2.  Minimal sinus finding which is believed to be chronic.

3.  No acute intracranial abnormality is appreciated.

 

Diagnostic code #2

 

Study was dictated in [place] Standard Time

## 2020-02-18 NOTE — CR
Chest: Portable view of the chest was obtained.

 

Comparison: Prior chest x-ray of 09/01/17.

 

Increased density is identified within both lung bases.  Upper lungs 

are clear.  Heart size within normal limits for portable technique.  

Bony structures are osteopenic.  Scoliosis is noted within the spine 

with mild degenerative change.

 

Impression:

1.  Increased density within both lung bases.  Differential includes 

aspiration as well as bibasilar pneumonia.

2.  Other findings which are believed to be incidental.

 

Diagnostic code #3

 

Study was dictated in [place] Standard Time

## 2020-02-18 NOTE — CR
Chest: Portable view of the chest was obtained.

 

Comparison: Prior chest x-ray of 02/18/20.

 

Increasing consolidation within the left base is seen.  Stable 

parenchymal density within the medial right lung base.  Lung markings 

are increased which are also stable.  Right-sided jugular line is seen

 with tip lying within the right atria.  Heart size appears within 

normal limits for portable technique.  Atherosclerotic calcification 

is noted within the aortic knob.  Bony structures are grossly intact.

 

Impression:

1.  Increasing consolidation within the left lung base.  Differential 

includes aspiration as well as worsening pneumonia.

2.  Other findings within the chest remains stable from prior chest 

x-ray.

3.  Right-sided jugular line which is an interval change with tip 

lying within the right atria.

 

Diagnostic code #3

 

This report was dictated in [place] Standard Time

## 2020-02-18 NOTE — PCM.SN
- Free Text/Narrative


Note: 


Called to ICU by Gavin HARDING for arterial line insertion.  Patient is currently on 

Levophed and vasopressin drips.  Procedure, risks, and benefits were explained 

to the patient including but not limited to: bleeding, infection, nerve damage, 

vascular damage.  The patient agrees and wishes to proceed at this time.  It is 

noted that the patient did receive Xarleto as recently as this morning.





Rt radial pulse was palpated as very strong; adequate collateral blood flow was 

noted.  Rt wrist was prepped (betadine) and draped in sterile fashion.  1% 

Lidocaine 0.5mL was infiltrated near the anticipated insertion site.  Rt radial 

pulse was palpated, 20g Arrow catheter was then introduced into the radial 

artery.  Guidewire advanced with ease, catheter advanced over the wire with 

ease.  Pulsatile blood return was noted, good arterial waveform is noted on the 

monitor.  Catheter was secured with tape, tegaderm, and armboard.  Patient 

tolerated procedure well, no complications are noted.





Anesthesia Time [id number]

## 2020-02-18 NOTE — CT
INDICATION:



Abdominal pain and sepsis. 



COMPARISON:



12/13/2019 



TECHNIQUE:



CT examination of the abdomen and pelvis was performed without contrast 

enhancement using 3 mm thick axial sections from the lung bases through the 

pubic symphysis. Oral contrast was not administered. 



Please note that all CT scans at this facility use dose modulation, 

iterative reconstruction, and/or weight-based dosing when appropriate to 

reduce radiation dose to as low as reasonably achievable. 



FINDINGS:



In the abdomen, the unenhanced liver, spleen, pancreas, and adrenals are 

normal in appearance. 



Again seen is the 2.5 centimeter cyst in the upper pole of the right kidney 

the unenhanced kidneys are otherwise normal in appearance. 



The gallbladder is normal in appearance. 



The abdominal aorta is normal in caliber with no sign of dilatation. There 

is no sign of retroperitoneal mass or adenopathy. 



The stomach, loops of small bowel, and colon in the abdomen are normal in 

appearance. 



In the pelvis, the appendix is nonvisualized, but there is no sign of an 

inflammatory process in the area of the appendix



The loops of small bowel and colon in the pelvis are normal in appearance. 

The previously seen dilatation of distal small bowel is no longer evident. 



The uterus is absent and the adnexal regions are normal in appearance. 



The urinary bladder is normal in appearance. 



There is no sign of pelvic or inguinal mass or adenopathy. 



There is no sign of any free fluid or free air in the abdomen or pelvis. 



Again seen is prominent consolidation of the anterior aspect of the right 

middle lobe at the lung base and prominent consolidation of the lateral and 

posterior basilar segments of the left lower lobe. These are similar in 

appearance to the previous study.



There is new moderate consolidation of the medial basilar segment of the 

right lower lobe, representing additional pneumonia.



There is no change in the large central superior L4 endplate compression. 

This is associated with mild loss of vertebral body height posteriorly. No 

change in moderate L4-5 disc degenerative disease.



IMPRESSION:



Resolution of previously seen partial small-bowel obstruction. 



Moderate infiltrates in the right middle lobe anteriorly and the posterior 

and lateral basilar segments of left lower lobe similar in appearance to 

the previous study, consistent with multifocal pneumonia. 



New mild consolidation of the medial basilar segment of the right lower 

lobe consistent with additional ammonia. 



CT of the abdomen shows no change in the simple cyst in the upper pole of 

the right kidney of no clinical concern. 



CT of the pelvis again shows changes of hysterectomy.



Please note that all CT scans at this facility use dose modulation, 

iterative reconstruction, and/or weight-based dosing when appropriate to 

reduce radiation dose to as low as reasonably achievable.



Dictated by Bharath Morgan MD @ Feb 18 2020 10:35PM



Signed by Dr. Bharath Morgan @ Feb 18 2020 10:56PM

## 2020-02-19 LAB
BUN SERPL-MCNC: 28 MG/DL (ref 7–18)
CHLORIDE SERPL-SCNC: 109 MMOL/L (ref 98–107)
CO2 SERPL-SCNC: 17 MMOL/L (ref 21–32)
GLUCOSE SERPL-MCNC: 89 MG/DL (ref 74–106)
POTASSIUM SERPL-SCNC: 3.9 MMOL/L (ref 3.5–5.1)
SODIUM SERPL-SCNC: 143 MMOL/L (ref 136–145)

## 2020-02-19 RX ADMIN — HYDROCODONE BITARTRATE AND ACETAMINOPHEN PRN TAB: 5; 325 TABLET ORAL at 09:10

## 2020-02-19 RX ADMIN — HYDROCODONE BITARTRATE AND ACETAMINOPHEN PRN TAB: 5; 325 TABLET ORAL at 19:05

## 2020-02-19 RX ADMIN — HYDROCORTISONE SODIUM SUCCINATE SCH MG: 100 INJECTION, POWDER, FOR SOLUTION INTRAMUSCULAR; INTRAVENOUS at 18:03

## 2020-02-19 RX ADMIN — HYDROCORTISONE SODIUM SUCCINATE SCH MG: 100 INJECTION, POWDER, FOR SOLUTION INTRAMUSCULAR; INTRAVENOUS at 11:19

## 2020-02-19 RX ADMIN — SODIUM CHLORIDE SCH MLS/HR: 9 INJECTION, SOLUTION INTRAMUSCULAR; INTRAVENOUS; SUBCUTANEOUS at 08:21

## 2020-02-19 RX ADMIN — FENTANYL CITRATE PRN MCG: 50 INJECTION, SOLUTION INTRAMUSCULAR; INTRAVENOUS at 03:30

## 2020-02-19 NOTE — PCM.PN
- General Info


Date of Service: 02/19/20


Subjective Update: 





Patient started on vasopressin overnight. MAP>65. Complaining of lower 

extremity pain. Given Fentanyl overnight which helped. Overall, states she 

feels better. No chest pain.





- Patient Data


Vitals - Most Recent: 


 Last Vital Signs











Temp  36.2 C   02/19/20 08:00


 


Pulse  108 H  02/18/20 19:29


 


Resp  24 H  02/19/20 09:00


 


BP  106/58 L  02/19/20 09:00


 


Pulse Ox  95   02/19/20 09:00











Weight - Most Recent: 48.897 kg


I&O - Last 24 Hours: 


 Intake & Output











 02/18/20 02/19/20 02/19/20





 22:59 06:59 14:59


 


Intake Total 310 2875 


 


Balance 310 2875 











Lab Results Last 24 Hours: 


 Laboratory Results - last 24 hr











  02/18/20 02/18/20 02/18/20 Range/Units





  12:55 13:33 13:33 


 


WBC   31.73 H   (4.0-11.0)  K/uL


 


RBC   3.18 L   (4.30-5.90)  M/uL


 


Hgb   9.6 L   (12.0-16.0)  g/dL


 


Hct   29.7 L   (36.0-46.0)  %


 


MCV   93.4   (80.0-98.0)  fL


 


MCH   30.2   (27.0-32.0)  pg


 


MCHC   32.3   (31.0-37.0)  g/dL


 


RDW Std Deviation   50.6   (28.0-62.0)  fl


 


RDW Coeff of Rai   15   (11.0-15.0)  %


 


Plt Count   271   (150-400)  K/uL


 


MPV   9.60   (7.40-12.00)  fL


 


Add Manual Diff   YES   


 


Neutrophils % (Manual)   83 H   (48.0-80.0)  %


 


Band Neutrophils %   10   %


 


Lymphocytes % (Manual)   4 L   (16.0-40.0)  %


 


Monocytes % (Manual)   3   (0.0-15.0)  %


 


Basophils % (Manual)     (0.0-1.5)  %


 


Nucleated RBC %   0.0   /100WBC


 


Absolute Seg Neuts   26.3 H   (1.4-5.7)  


 


Band Neutrophils #   3.2   


 


Lymphocytes # (Manual)   1.3   (0.6-2.4)  


 


Monocytes # (Manual)   1.0 H   (0.0-0.8)  


 


Basophils # (Manual)     (0.0-0.1)  


 


Nucleated RBCs #   0   K/uL


 


ESR     (0-29)  mm/hr


 


ABG pH     (7.35-7.45)  


 


ABG pCO2     (35-45)  mmHG


 


ABG pO2     ()  mmHG


 


ABG HCO3     


 


ABG Total CO2     


 


ABG Base Excess     (-2.0-2.0)  


 


VBG pH     (7.31-7.41)  


 


VBG pCO2     (35-45)  mmHG


 


VBG pO2     (30-40)  mmHG


 


VBG HCO3     (22-30)  mEq/L


 


VBG Total CO2     (41-51)  mmol/L


 


VBG Base Excess     (-3.0-3.0)  


 


Lactate     (0.20-2.00)  mmol/L


 


Sodium    139  (136-145)  mmol/L


 


Potassium    4.5  (3.5-5.1)  mmol/L


 


Chloride    106  ()  mmol/L


 


Carbon Dioxide    21.7  (21.0-32.0)  mmol/L


 


BUN    35 H  (7.0-18.0)  mg/dL


 


Creatinine    1.8 H  (0.6-1.0)  mg/dL


 


Est Cr Clr Drug Dosing    16.69  mL/min


 


Estimated GFR (MDRD)    26.9  ml/min


 


Glucose    98  ()  mg/dL


 


Calcium    8.7  (8.5-10.1)  mg/dL


 


Magnesium     (1.8-2.4)  mg/dL


 


Total Bilirubin    0.5  (0.2-1.0)  mg/dL


 


AST    22  (15-37)  IU/L


 


ALT    5 L  (14-63)  IU/L


 


Alkaline Phosphatase    74  ()  U/L


 


Troponin I    0.059 H*  (0.000-0.056)  ng/mL


 


C-Reactive Protein     (0.00-0.90)  mg/dL


 


B-Natriuretic Peptide     (<100)  PG/ML


 


Total Protein    5.3 L  (6.4-8.2)  g/dL


 


Albumin    2.1 L  (3.4-5.0)  g/dL


 


Globulin    3.2  (2.6-4.0)  g/dL


 


Albumin/Globulin Ratio    0.7 L  (0.9-1.6)  


 


TSH 3rd Generation     (0.36-3.74)  uIU/mL


 


Urine Color  YELLOW    


 


Urine Appearance  SLT CLOUDY    


 


Urine pH  5.5    (5.0-8.0)  


 


Ur Specific Gravity  1.025    (1.001-1.035)  


 


Urine Protein  TRACE H    (NEGATIVE)  mg/dL


 


Urine Glucose (UA)  NEGATIVE    (NEGATIVE)  mg/dL


 


Urine Ketones  NEGATIVE    (NEGATIVE)  mg/dL


 


Urine Occult Blood  SMALL H    (NEGATIVE)  


 


Urine Nitrite  NEGATIVE    (NEGATIVE)  


 


Urine Bilirubin  NEGATIVE    (NEGATIVE)  


 


Urine Urobilinogen  0.2    (<2.0)  EU/dL


 


Ur Leukocyte Esterase  MODERATE H    (NEGATIVE)  


 


Urine RBC  0-4    (0-2/HPF)  


 


Urine WBC  20-30    (0-5/HPF)  


 


Ur Epithelial Cells  RARE    (NONE-FEW)  


 


Urine Bacteria  3+ H    (NEGATIVE)  














  02/18/20 02/18/20 02/18/20 Range/Units





  13:33 13:33 13:33 


 


WBC     (4.0-11.0)  K/uL


 


RBC     (4.30-5.90)  M/uL


 


Hgb     (12.0-16.0)  g/dL


 


Hct     (36.0-46.0)  %


 


MCV     (80.0-98.0)  fL


 


MCH     (27.0-32.0)  pg


 


MCHC     (31.0-37.0)  g/dL


 


RDW Std Deviation     (28.0-62.0)  fl


 


RDW Coeff of Rai     (11.0-15.0)  %


 


Plt Count     (150-400)  K/uL


 


MPV     (7.40-12.00)  fL


 


Add Manual Diff     


 


Neutrophils % (Manual)     (48.0-80.0)  %


 


Band Neutrophils %     %


 


Lymphocytes % (Manual)     (16.0-40.0)  %


 


Monocytes % (Manual)     (0.0-15.0)  %


 


Basophils % (Manual)     (0.0-1.5)  %


 


Nucleated RBC %     /100WBC


 


Absolute Seg Neuts     (1.4-5.7)  


 


Band Neutrophils #     


 


Lymphocytes # (Manual)     (0.6-2.4)  


 


Monocytes # (Manual)     (0.0-0.8)  


 


Basophils # (Manual)     (0.0-0.1)  


 


Nucleated RBCs #     K/uL


 


ESR     (0-29)  mm/hr


 


ABG pH     (7.35-7.45)  


 


ABG pCO2     (35-45)  mmHG


 


ABG pO2     ()  mmHG


 


ABG HCO3     


 


ABG Total CO2     


 


ABG Base Excess     (-2.0-2.0)  


 


VBG pH     (7.31-7.41)  


 


VBG pCO2     (35-45)  mmHG


 


VBG pO2     (30-40)  mmHG


 


VBG HCO3     (22-30)  mEq/L


 


VBG Total CO2     (41-51)  mmol/L


 


VBG Base Excess     (-3.0-3.0)  


 


Lactate  1.8    (0.20-2.00)  mmol/L


 


Sodium     (136-145)  mmol/L


 


Potassium     (3.5-5.1)  mmol/L


 


Chloride     ()  mmol/L


 


Carbon Dioxide     (21.0-32.0)  mmol/L


 


BUN     (7.0-18.0)  mg/dL


 


Creatinine     (0.6-1.0)  mg/dL


 


Est Cr Clr Drug Dosing     mL/min


 


Estimated GFR (MDRD)     ml/min


 


Glucose     ()  mg/dL


 


Calcium     (8.5-10.1)  mg/dL


 


Magnesium     (1.8-2.4)  mg/dL


 


Total Bilirubin     (0.2-1.0)  mg/dL


 


AST     (15-37)  IU/L


 


ALT     (14-63)  IU/L


 


Alkaline Phosphatase     ()  U/L


 


Troponin I     (0.000-0.056)  ng/mL


 


C-Reactive Protein    14.30 H  (0.00-0.90)  mg/dL


 


B-Natriuretic Peptide   474 H   (<100)  PG/ML


 


Total Protein     (6.4-8.2)  g/dL


 


Albumin     (3.4-5.0)  g/dL


 


Globulin     (2.6-4.0)  g/dL


 


Albumin/Globulin Ratio     (0.9-1.6)  


 


TSH 3rd Generation     (0.36-3.74)  uIU/mL


 


Urine Color     


 


Urine Appearance     


 


Urine pH     (5.0-8.0)  


 


Ur Specific Gravity     (1.001-1.035)  


 


Urine Protein     (NEGATIVE)  mg/dL


 


Urine Glucose (UA)     (NEGATIVE)  mg/dL


 


Urine Ketones     (NEGATIVE)  mg/dL


 


Urine Occult Blood     (NEGATIVE)  


 


Urine Nitrite     (NEGATIVE)  


 


Urine Bilirubin     (NEGATIVE)  


 


Urine Urobilinogen     (<2.0)  EU/dL


 


Ur Leukocyte Esterase     (NEGATIVE)  


 


Urine RBC     (0-2/HPF)  


 


Urine WBC     (0-5/HPF)  


 


Ur Epithelial Cells     (NONE-FEW)  


 


Urine Bacteria     (NEGATIVE)  














  02/18/20 02/18/20 02/18/20 Range/Units





  16:39 16:39 16:39 


 


WBC     (4.0-11.0)  K/uL


 


RBC     (4.30-5.90)  M/uL


 


Hgb     (12.0-16.0)  g/dL


 


Hct     (36.0-46.0)  %


 


MCV     (80.0-98.0)  fL


 


MCH     (27.0-32.0)  pg


 


MCHC     (31.0-37.0)  g/dL


 


RDW Std Deviation     (28.0-62.0)  fl


 


RDW Coeff of Rai     (11.0-15.0)  %


 


Plt Count     (150-400)  K/uL


 


MPV     (7.40-12.00)  fL


 


Add Manual Diff     


 


Neutrophils % (Manual)     (48.0-80.0)  %


 


Band Neutrophils %     %


 


Lymphocytes % (Manual)     (16.0-40.0)  %


 


Monocytes % (Manual)     (0.0-15.0)  %


 


Basophils % (Manual)     (0.0-1.5)  %


 


Nucleated RBC %     /100WBC


 


Absolute Seg Neuts     (1.4-5.7)  


 


Band Neutrophils #     


 


Lymphocytes # (Manual)     (0.6-2.4)  


 


Monocytes # (Manual)     (0.0-0.8)  


 


Basophils # (Manual)     (0.0-0.1)  


 


Nucleated RBCs #     K/uL


 


ESR     (0-29)  mm/hr


 


ABG pH     (7.35-7.45)  


 


ABG pCO2     (35-45)  mmHG


 


ABG pO2     ()  mmHG


 


ABG HCO3     


 


ABG Total CO2     


 


ABG Base Excess     (-2.0-2.0)  


 


VBG pH  7.36    (7.31-7.41)  


 


VBG pCO2  37    (35-45)  mmHG


 


VBG pO2  40    (30-40)  mmHG


 


VBG HCO3  21 L    (22-30)  mEq/L


 


VBG Total CO2  20 L    (41-51)  mmol/L


 


VBG Base Excess  -4.4 L    (-3.0-3.0)  


 


Lactate   1.1   (0.20-2.00)  mmol/L


 


Sodium    140  (136-145)  mmol/L


 


Potassium    4.2  (3.5-5.1)  mmol/L


 


Chloride    107  ()  mmol/L


 


Carbon Dioxide    22.9  (21.0-32.0)  mmol/L


 


BUN    33 H  (7.0-18.0)  mg/dL


 


Creatinine    1.4 H  (0.6-1.0)  mg/dL


 


Est Cr Clr Drug Dosing    21.45  mL/min


 


Estimated GFR (MDRD)    35.9  ml/min


 


Glucose    100  ()  mg/dL


 


Calcium    8.1 L  (8.5-10.1)  mg/dL


 


Magnesium     (1.8-2.4)  mg/dL


 


Total Bilirubin     (0.2-1.0)  mg/dL


 


AST     (15-37)  IU/L


 


ALT     (14-63)  IU/L


 


Alkaline Phosphatase     ()  U/L


 


Troponin I     (0.000-0.056)  ng/mL


 


C-Reactive Protein     (0.00-0.90)  mg/dL


 


B-Natriuretic Peptide     (<100)  PG/ML


 


Total Protein     (6.4-8.2)  g/dL


 


Albumin     (3.4-5.0)  g/dL


 


Globulin     (2.6-4.0)  g/dL


 


Albumin/Globulin Ratio     (0.9-1.6)  


 


TSH 3rd Generation     (0.36-3.74)  uIU/mL


 


Urine Color     


 


Urine Appearance     


 


Urine pH     (5.0-8.0)  


 


Ur Specific Gravity     (1.001-1.035)  


 


Urine Protein     (NEGATIVE)  mg/dL


 


Urine Glucose (UA)     (NEGATIVE)  mg/dL


 


Urine Ketones     (NEGATIVE)  mg/dL


 


Urine Occult Blood     (NEGATIVE)  


 


Urine Nitrite     (NEGATIVE)  


 


Urine Bilirubin     (NEGATIVE)  


 


Urine Urobilinogen     (<2.0)  EU/dL


 


Ur Leukocyte Esterase     (NEGATIVE)  


 


Urine RBC     (0-2/HPF)  


 


Urine WBC     (0-5/HPF)  


 


Ur Epithelial Cells     (NONE-FEW)  


 


Urine Bacteria     (NEGATIVE)  














  02/18/20 02/18/20 02/18/20 Range/Units





  16:39 16:39 18:17 


 


WBC     (4.0-11.0)  K/uL


 


RBC     (4.30-5.90)  M/uL


 


Hgb     (12.0-16.0)  g/dL


 


Hct     (36.0-46.0)  %


 


MCV     (80.0-98.0)  fL


 


MCH     (27.0-32.0)  pg


 


MCHC     (31.0-37.0)  g/dL


 


RDW Std Deviation     (28.0-62.0)  fl


 


RDW Coeff of Rai     (11.0-15.0)  %


 


Plt Count     (150-400)  K/uL


 


MPV     (7.40-12.00)  fL


 


Add Manual Diff     


 


Neutrophils % (Manual)     (48.0-80.0)  %


 


Band Neutrophils %     %


 


Lymphocytes % (Manual)     (16.0-40.0)  %


 


Monocytes % (Manual)     (0.0-15.0)  %


 


Basophils % (Manual)     (0.0-1.5)  %


 


Nucleated RBC %     /100WBC


 


Absolute Seg Neuts     (1.4-5.7)  


 


Band Neutrophils #     


 


Lymphocytes # (Manual)     (0.6-2.4)  


 


Monocytes # (Manual)     (0.0-0.8)  


 


Basophils # (Manual)     (0.0-0.1)  


 


Nucleated RBCs #     K/uL


 


ESR    37 H  (0-29)  mm/hr


 


ABG pH     (7.35-7.45)  


 


ABG pCO2     (35-45)  mmHG


 


ABG pO2     ()  mmHG


 


ABG HCO3     


 


ABG Total CO2     


 


ABG Base Excess     (-2.0-2.0)  


 


VBG pH     (7.31-7.41)  


 


VBG pCO2     (35-45)  mmHG


 


VBG pO2     (30-40)  mmHG


 


VBG HCO3     (22-30)  mEq/L


 


VBG Total CO2     (41-51)  mmol/L


 


VBG Base Excess     (-3.0-3.0)  


 


Lactate     (0.20-2.00)  mmol/L


 


Sodium     (136-145)  mmol/L


 


Potassium     (3.5-5.1)  mmol/L


 


Chloride     ()  mmol/L


 


Carbon Dioxide     (21.0-32.0)  mmol/L


 


BUN     (7.0-18.0)  mg/dL


 


Creatinine     (0.6-1.0)  mg/dL


 


Est Cr Clr Drug Dosing     mL/min


 


Estimated GFR (MDRD)     ml/min


 


Glucose     ()  mg/dL


 


Calcium     (8.5-10.1)  mg/dL


 


Magnesium     (1.8-2.4)  mg/dL


 


Total Bilirubin     (0.2-1.0)  mg/dL


 


AST     (15-37)  IU/L


 


ALT     (14-63)  IU/L


 


Alkaline Phosphatase     ()  U/L


 


Troponin I  0.085 H*    (0.000-0.056)  ng/mL


 


C-Reactive Protein     (0.00-0.90)  mg/dL


 


B-Natriuretic Peptide     (<100)  PG/ML


 


Total Protein     (6.4-8.2)  g/dL


 


Albumin     (3.4-5.0)  g/dL


 


Globulin     (2.6-4.0)  g/dL


 


Albumin/Globulin Ratio     (0.9-1.6)  


 


TSH 3rd Generation   9.26 H   (0.36-3.74)  uIU/mL


 


Urine Color     


 


Urine Appearance     


 


Urine pH     (5.0-8.0)  


 


Ur Specific Gravity     (1.001-1.035)  


 


Urine Protein     (NEGATIVE)  mg/dL


 


Urine Glucose (UA)     (NEGATIVE)  mg/dL


 


Urine Ketones     (NEGATIVE)  mg/dL


 


Urine Occult Blood     (NEGATIVE)  


 


Urine Nitrite     (NEGATIVE)  


 


Urine Bilirubin     (NEGATIVE)  


 


Urine Urobilinogen     (<2.0)  EU/dL


 


Ur Leukocyte Esterase     (NEGATIVE)  


 


Urine RBC     (0-2/HPF)  


 


Urine WBC     (0-5/HPF)  


 


Ur Epithelial Cells     (NONE-FEW)  


 


Urine Bacteria     (NEGATIVE)  














  02/18/20 02/18/20 02/18/20 Range/Units





  22:53 22:53 22:53 


 


WBC   43.50 H   (4.0-11.0)  K/uL


 


RBC   2.99 L   (4.30-5.90)  M/uL


 


Hgb   9.0 L   (12.0-16.0)  g/dL


 


Hct   27.6 L   (36.0-46.0)  %


 


MCV   92.3   (80.0-98.0)  fL


 


MCH   30.1   (27.0-32.0)  pg


 


MCHC   32.6   (31.0-37.0)  g/dL


 


RDW Std Deviation   50.5   (28.0-62.0)  fl


 


RDW Coeff of Rai   15   (11.0-15.0)  %


 


Plt Count   336   (150-400)  K/uL


 


MPV   9.70   (7.40-12.00)  fL


 


Add Manual Diff   YES   


 


Neutrophils % (Manual)   21 L   (48.0-80.0)  %


 


Band Neutrophils %   10   %


 


Lymphocytes % (Manual)   6 L   (16.0-40.0)  %


 


Monocytes % (Manual)   3   (0.0-15.0)  %


 


Basophils % (Manual)     (0.0-1.5)  %


 


Nucleated RBC %   0.0   /100WBC


 


Absolute Seg Neuts   9.1 H   (1.4-5.7)  


 


Band Neutrophils #   4.4   


 


Lymphocytes # (Manual)   2.6 H   (0.6-2.4)  


 


Monocytes # (Manual)   1.3 H   (0.0-0.8)  


 


Basophils # (Manual)     (0.0-0.1)  


 


Nucleated RBCs #   0   K/uL


 


ESR     (0-29)  mm/hr


 


ABG pH    7.312 L  (7.35-7.45)  


 


ABG pCO2    32 L  (35-45)  mmHG


 


ABG pO2    89  ()  mmHG


 


ABG HCO3    16.4  


 


ABG Total CO2    17  


 


ABG Base Excess    -9 L  (-2.0-2.0)  


 


VBG pH     (7.31-7.41)  


 


VBG pCO2     (35-45)  mmHG


 


VBG pO2     (30-40)  mmHG


 


VBG HCO3     (22-30)  mEq/L


 


VBG Total CO2     (41-51)  mmol/L


 


VBG Base Excess     (-3.0-3.0)  


 


Lactate     (0.20-2.00)  mmol/L


 


Sodium     (136-145)  mmol/L


 


Potassium     (3.5-5.1)  mmol/L


 


Chloride     ()  mmol/L


 


Carbon Dioxide     (21.0-32.0)  mmol/L


 


BUN     (7.0-18.0)  mg/dL


 


Creatinine     (0.6-1.0)  mg/dL


 


Est Cr Clr Drug Dosing     mL/min


 


Estimated GFR (MDRD)     ml/min


 


Glucose     ()  mg/dL


 


Calcium     (8.5-10.1)  mg/dL


 


Magnesium     (1.8-2.4)  mg/dL


 


Total Bilirubin     (0.2-1.0)  mg/dL


 


AST     (15-37)  IU/L


 


ALT     (14-63)  IU/L


 


Alkaline Phosphatase     ()  U/L


 


Troponin I  0.076 H*    (0.000-0.056)  ng/mL


 


C-Reactive Protein     (0.00-0.90)  mg/dL


 


B-Natriuretic Peptide     (<100)  PG/ML


 


Total Protein     (6.4-8.2)  g/dL


 


Albumin     (3.4-5.0)  g/dL


 


Globulin     (2.6-4.0)  g/dL


 


Albumin/Globulin Ratio     (0.9-1.6)  


 


TSH 3rd Generation     (0.36-3.74)  uIU/mL


 


Urine Color     


 


Urine Appearance     


 


Urine pH     (5.0-8.0)  


 


Ur Specific Gravity     (1.001-1.035)  


 


Urine Protein     (NEGATIVE)  mg/dL


 


Urine Glucose (UA)     (NEGATIVE)  mg/dL


 


Urine Ketones     (NEGATIVE)  mg/dL


 


Urine Occult Blood     (NEGATIVE)  


 


Urine Nitrite     (NEGATIVE)  


 


Urine Bilirubin     (NEGATIVE)  


 


Urine Urobilinogen     (<2.0)  EU/dL


 


Ur Leukocyte Esterase     (NEGATIVE)  


 


Urine RBC     (0-2/HPF)  


 


Urine WBC     (0-5/HPF)  


 


Ur Epithelial Cells     (NONE-FEW)  


 


Urine Bacteria     (NEGATIVE)  














  02/19/20 02/19/20 02/19/20 Range/Units





  05:17 05:17 05:17 


 


WBC   40.32 H   (4.0-11.0)  K/uL


 


RBC   2.97 L   (4.30-5.90)  M/uL


 


Hgb   8.9 L   (12.0-16.0)  g/dL


 


Hct   27.5 L   (36.0-46.0)  %


 


MCV   92.6   (80.0-98.0)  fL


 


MCH   30.0   (27.0-32.0)  pg


 


MCHC   32.4   (31.0-37.0)  g/dL


 


RDW Std Deviation   51.1   (28.0-62.0)  fl


 


RDW Coeff of Rai   15   (11.0-15.0)  %


 


Plt Count   328   (150-400)  K/uL


 


MPV   10.00   (7.40-12.00)  fL


 


Add Manual Diff   YES   


 


Neutrophils % (Manual)   62   (48.0-80.0)  %


 


Band Neutrophils %   35   %


 


Lymphocytes % (Manual)   2 L   (16.0-40.0)  %


 


Monocytes % (Manual)     (0.0-15.0)  %


 


Basophils % (Manual)   1   (0.0-1.5)  %


 


Nucleated RBC %   0.0   /100WBC


 


Absolute Seg Neuts   25.0 H   (1.4-5.7)  


 


Band Neutrophils #   14.1   


 


Lymphocytes # (Manual)   0.8   (0.6-2.4)  


 


Monocytes # (Manual)     (0.0-0.8)  


 


Basophils # (Manual)   0.4 H   (0.0-0.1)  


 


Nucleated RBCs #   0   K/uL


 


ESR     (0-29)  mm/hr


 


ABG pH     (7.35-7.45)  


 


ABG pCO2     (35-45)  mmHG


 


ABG pO2     ()  mmHG


 


ABG HCO3     


 


ABG Total CO2     


 


ABG Base Excess     (-2.0-2.0)  


 


VBG pH     (7.31-7.41)  


 


VBG pCO2     (35-45)  mmHG


 


VBG pO2     (30-40)  mmHG


 


VBG HCO3     (22-30)  mEq/L


 


VBG Total CO2     (41-51)  mmol/L


 


VBG Base Excess     (-3.0-3.0)  


 


Lactate     (0.20-2.00)  mmol/L


 


Sodium    143  (136-145)  mmol/L


 


Potassium    3.9  (3.5-5.1)  mmol/L


 


Chloride    109 H  ()  mmol/L


 


Carbon Dioxide    17.0 L  (21.0-32.0)  mmol/L


 


BUN    28 H  (7.0-18.0)  mg/dL


 


Creatinine    1.1 H  (0.6-1.0)  mg/dL


 


Est Cr Clr Drug Dosing    29.24  mL/min


 


Estimated GFR (MDRD)    47.4  ml/min


 


Glucose    89  ()  mg/dL


 


Calcium    7.7 L  (8.5-10.1)  mg/dL


 


Magnesium  1.3 L    (1.8-2.4)  mg/dL


 


Total Bilirubin    0.4  (0.2-1.0)  mg/dL


 


AST    31  (15-37)  IU/L


 


ALT    16  (14-63)  IU/L


 


Alkaline Phosphatase    85  ()  U/L


 


Troponin I  0.090 H*    (0.000-0.056)  ng/mL


 


C-Reactive Protein     (0.00-0.90)  mg/dL


 


B-Natriuretic Peptide     (<100)  PG/ML


 


Total Protein    5.2 L  (6.4-8.2)  g/dL


 


Albumin    2.0 L  (3.4-5.0)  g/dL


 


Globulin    3.2  (2.6-4.0)  g/dL


 


Albumin/Globulin Ratio    0.6 L  (0.9-1.6)  


 


TSH 3rd Generation     (0.36-3.74)  uIU/mL


 


Urine Color     


 


Urine Appearance     


 


Urine pH     (5.0-8.0)  


 


Ur Specific Gravity     (1.001-1.035)  


 


Urine Protein     (NEGATIVE)  mg/dL


 


Urine Glucose (UA)     (NEGATIVE)  mg/dL


 


Urine Ketones     (NEGATIVE)  mg/dL


 


Urine Occult Blood     (NEGATIVE)  


 


Urine Nitrite     (NEGATIVE)  


 


Urine Bilirubin     (NEGATIVE)  


 


Urine Urobilinogen     (<2.0)  EU/dL


 


Ur Leukocyte Esterase     (NEGATIVE)  


 


Urine RBC     (0-2/HPF)  


 


Urine WBC     (0-5/HPF)  


 


Ur Epithelial Cells     (NONE-FEW)  


 


Urine Bacteria     (NEGATIVE)  











Jose J Results Last 24 Hours: 


 Microbiology











 02/18/20 13:33 Anaerobic Blood Culture - Final





 Blood - Venous - Lab Draw 











Med Orders - Current: 


 Current Medications





Acetaminophen (Tylenol)  650 mg PO Q4H PRN


   PRN Reason: Pain (Mild 1-3)/fever


   Last Admin: 02/19/20 05:37 Dose:  650 mg


Hydrocodone Bitart/Acetaminophen (Norco 325-5 Mg)  1 tab PO Q6H PRN


   PRN Reason: Pain


   Last Admin: 02/19/20 09:10 Dose:  1 tab


Docusate Sodium (Colace)  100 mg PO BID PRN


   PRN Reason: Constipation


Hydrocortisone Sodium Succinate (Solu-Cortef)  100 mg IVPUSH Q8H [facility]


Norepinephrine Bitartrate (Norepinephr-0.9% Nacl 4 Mg/250)  4 mg in 250 mls @ 

7.5 mls/hr IV TITRATE [facility]; Protocol


   Last Titration: 02/19/20 09:49 Dose:  Infused


Pantoprazole Sodium 40 mg/ (Sodium Chloride)  10 mls @ 300 mls/hr IV DAILY [facility]


   Last Admin: 02/19/20 08:21 Dose:  300 mls/hr


Ertapenem 1 gm/ Sodium (Chloride)  50 mls @ 100 mls/hr IV Q24H [facility]


   Last Admin: 02/18/20 20:15 Dose:  100 mls/hr


Vancomycin HCl 1 gm/ Sodium (Chloride)  250 mls @ 166 mls/hr IV Q24H [facility]


   Last Admin: 02/18/20 20:45 Dose:  166 mls/hr


Levofloxacin/Dextrose 750 mg/ (Premix)  150 mls @ 100 mls/hr IV Q48H [facility]


Sodium Chloride (Normal Saline)  1,000 mls @ 100 mls/hr IV Q10H [facility]


Levothyroxine Sodium (Levothyroxine)  75 mcg PO ACBREAKFAST [facility]


   Last Admin: 02/19/20 06:36 Dose:  75 mcg


Ondansetron HCl (Zofran Odt)  4 mg PO Q4H PRN


   PRN Reason: nausea, able to take PO


Ondansetron HCl (Zofran)  4 mg IVPUSH Q4H PRN


   PRN Reason: Nausea


Polyethylene Glycol (Miralax)  17 gm PO DAILY PRN


   PRN Reason: Constipation


Rivaroxaban (Xarelto)  10 mg PO DAILY [facility]


   Last Admin: 02/19/20 08:17 Dose:  10 mg


Sodium Chloride (Saline Flush)  10 ml FLUSH ASDIRECTED PRN


   PRN Reason: Keep Vein Open


Sodium Chloride (Saline Flush)  2.5 ml FLUSH ASDIRECTED PRN


   PRN Reason: Keep Vein Open


Sodium Chloride (Ocean Nasal Spray)  5 ml PEDRO Q4H PRN


   PRN Reason: Congestion


   Last Admin: 02/19/20 09:14 Dose:  5 ml


Tramadol HCl (Ultram)  100 mg PO Q8H PRN


   PRN Reason: Pain


   Last Admin: 02/19/20 05:36 Dose:  100 mg


Vancomycin HCl (Pharmacy To Dose - Vancomycin)  1 dose .XX ASDIRECTED [facility]





Discontinued Medications





Aspirin (Aspirin)  325 mg PO ONETIME ONE


   Stop: 02/18/20 17:23


   Last Admin: 02/18/20 17:45 Dose:  325 mg


Enoxaparin Sodium (Lovenox)  40 mg SUBCUT Q24H [facility]


   Last Admin: 02/18/20 21:02 Dose:  Not Given


Fentanyl (Sublimaze)  25 mcg IVPUSH Q4HR PRN


   PRN Reason: Severe pain


   Last Admin: 02/19/20 03:30 Dose:  25 mcg


Fentanyl (Fentanyl)  25 mcg IVPUSH ONETIME ONE


   Stop: 02/19/20 01:40


   Last Admin: 02/19/20 02:01 Dose:  Not Given


Fentanyl (Sublimaze)  25 mcg IVPUSH ONETIME ONE


   Stop: 02/19/20 02:01


   Last Admin: 02/19/20 02:01 Dose:  25 mcg


Hydrocortisone Sodium Succinate (Solu-Cortef)  100 mg IVPUSH ONETIME ONE


   Stop: 02/19/20 03:31


   Last Admin: 02/19/20 03:23 Dose:  100 mg


Sodium Chloride (Normal Saline)  1,000 mls @ 999 mls/hr IV .Bolus ONE


   Stop: 02/18/20 14:18


   Last Admin: 02/18/20 14:06 Dose:  999 mls/hr


Sodium Chloride (Normal Saline)  1,000 mls @ 999 mls/hr IV .Bolus ONE


   Stop: 02/18/20 14:30


   Last Admin: 02/18/20 14:06 Dose:  999 mls/hr


Meropenem 1 gm/ Sodium (Chloride)  100 mls @ 200 mls/hr IV ONETIME ONE


   Stop: 02/18/20 15:39


   Last Admin: 02/18/20 17:30 Dose:  Not Given


Sodium Chloride (Normal Saline)  1,000 mls @ 999 mls/hr IV .Bolus ONE


   Stop: 02/18/20 17:45


   Last Admin: 02/18/20 17:07 Dose:  999 mls/hr


Levofloxacin/Dextrose 750 mg/ (Premix)  150 mls @ 100 mls/hr IV ONETIME ONE


   Stop: 02/18/20 18:36


   Last Admin: 02/18/20 17:46 Dose:  100 mls/hr


Piperacillin Sod/Tazobactam (Sod 3.375 gm/ Sodium Chloride)  50 mls @ 100 mls/

hr IV Q8H [facility]


Sodium Chloride (Normal Saline)  1,000 mls @ 125 mls/hr IV CONTINUOUS [facility]


   Last Infusion: 02/19/20 09:15 Dose:  100 mls/hr


Sodium Chloride (Normal Saline)  1,000 mls @ 999 mls/hr IV .Bolus ONE


   Stop: 02/18/20 20:24


   Last Admin: 02/18/20 19:27 Dose:  999 mls/hr


Vasopressin 100 units/ Sodium (Chloride)  100 mls @ 1.8 mls/hr IV TITRATE [facility]


Vasopressin 100 units/ Sodium (Chloride)  100 mls @ 1.8 mls/hr IV CONTINUOUS [facility]


   Last Admin: 02/18/20 22:40 Dose:  0.03 units/min, 1.8 mls/hr


Magnesium Sulfate 4 gm/ Premix  100 mls @ 50 mls/hr IV ONETIME ONE


   Stop: 02/19/20 08:37


   Last Admin: 02/19/20 06:48 Dose:  50 mls/hr


Ibuprofen (Motrin)  800 mg PO Q6H PRN


   PRN Reason: Pain (mild 1-3)


Ketorolac Tromethamine (Toradol)  15 mg IV Q6H PRN


   PRN Reason: Pain (moderate 4-6)


Levothyroxine Sodium (Synthroid)  75 mcg PO ACBREAKFAST [facility]


Lidocaine HCl (Xylocaine 1%) Confirm Administered Dose 20 ml .ROUTE .[employer] ONE


   Stop: 02/18/20 21:44


   Last Admin: 02/18/20 22:15 Dose:  20 ml











- Exam


Quality Assessment: Supplemental Oxygen, Central Line/PICC


General: Alert, Oriented, Cooperative, No Acute Distress


Lungs: Other (Good lung sound upper lung fields bilaterally. More rhonchus on 

left lower lung field. no wheezing.)


Cardiovascular: Regular Rate, Regular Rhythm


GI/Abdominal Exam: Normal Bowel Sounds, Soft, Non-Tender, No Distention


Extremities: Other (bilateral lower extremity, mostly on her feet pain.)


Skin: Cool


Psy/Mental Status: Alert, Normal Affect





Sepsis Event Note





- Evaluation


Sepsis Screening Result: No Definite Risk





- Focused Exam


Vital Signs: 


 Vital Signs











  Temp Resp BP BP BP Pulse Ox


 


 02/19/20 09:00   24 H  108/50 L  106/58 L   95


 


 02/19/20 08:00  36.2 C  22 H  122/60  116/50 L   97


 


 02/19/20 07:00   24 H   123/58 L   94 L


 


 02/19/20 06:00   27 H   123/55 L   93 L


 


 02/19/20 05:00   21 H   110/46 L   96


 


 02/19/20 04:00  37.1 C  20  118/59 L  109/44 L   95


 


 02/19/20 03:00   26 H  113/51 L  113/52 L   95


 


 02/19/20 02:00   26 H  120/51 L  112/51 L   93 L


 


 02/19/20 01:00  37.1 C  28 H  120/93 H  110/50 L   95


 


 02/19/20 00:00   25 H   105/46 L   90 L


 


 02/18/20 23:00   27 H   110/51 L  99/42 L  95











Date Exam was Performed: 02/19/20


Time Exam was Performed: 10:32





- Problem List Review


Problem List Initiated/Reviewed/Updated: Yes





- My Orders


Last 24 Hours: 


My Active Orders





02/18/20 13:00


CULTURE BLOOD [BC] Stat 





02/18/20 13:33


CULTURE BLOOD [BC] Stat 





02/18/20 17:46


Bedrest Bedside Commode [RC] ASDIRECTED 


Oxygen Therapy [RC] PRN 


VTE/DVT Education [RC] DAILY 


Vital Signs [RC] Q1H 


Acetaminophen [Tylenol]   650 mg PO Q4H PRN 


Docusate Sodium [Colace]   100 mg PO BID PRN 


Ondansetron [Zofran ODT]   4 mg PO Q4H PRN 


Ondansetron [Zofran]   4 mg IVPUSH Q4H PRN 


polyethylene glycoL 3350 [MiraLAX]   17 gm PO DAILY PRN 


Resuscitation Status Routine 





02/18/20 17:56


Telemetry Monitoring [Cardiac Monitoring] [RC] Q8H 





02/18/20 18:05


Blood Culture x2 Reflex Set [OM.PC] Stat 





02/18/20 19:15


Pantoprazole [ProTONIX IV***] 40 mg   Sodium Chloride 0.9% [Normal Saline] 10 

ml IV DAILY 





02/18/20 20:00


Ertapenem [INVanz] 1 gm   Sodium Chloride 0.9% [Normal Saline] 50 ml IV Q24H 


Pharmacy to Dose - Vancomycin   1 dose .XX ASDIRECTED 





02/18/20 21:00


Vancomycin [Vancocin] 1 gm   Sodium Chloride 0.9% [Normal Saline (AdvBag)] 250 

ml IV Q24H 





02/18/20 Dinner


Regular Diet [DIET] 





02/19/20 07:30


Levothyroxine   75 mcg PO ACBREAKFAST 





02/19/20 08:15


Sodium Chloride 0.65% [Ocean Nasal Spray]   5 ml PEDRO Q4H PRN 





02/19/20 08:35


Acetaminophen/HYDROcodone [Norco 325-5 MG]   1 tab PO Q6H PRN 





02/19/20 09:00


Rivaroxaban [Xarelto]   10 mg PO DAILY 





02/20/20 05:11


CBC WITH AUTO DIFF [HEME] AM 


COMPREHENSIVE METABOLIC PN,CMP [CHEM] AM 


MAGNESIUM [CHEM] AM 





02/21/20 05:11


CBC WITH AUTO DIFF [HEME] AM 


COMPREHENSIVE METABOLIC PN,CMP [CHEM] AM 














- Plan


Plan:: 





A:


1. Septic shock on Norepinephrine, vasopressin


2. Multi focal pneumonia


3. Urinary tract infection


4. Elevated troponin


5. Hypomagnesemia


6. Acute kidney injury, resolved


7. PMH CREST Syndrome, chronic pain, AFib





P:





Septic shock- DC vasopressin. Wean off Norepinephrine with goal MAP>65. 

Decreased maintenance fluids to 100 ml/hr NS. continue hydrocortisone and 

pantoprazole.  Will add levaquin to cover for Pseudomonas. Elevated troponin 

likely 2/2 to acute infection. Will continue to monitor for now. Added Norco 

for pain control. Holding Norvasc due to septic shock. Will resume once off 

vasopressors. 





dispo: 2-3 days

## 2020-02-19 NOTE — PN
THC Physician - Brief Progress CojeTSBHNOYYD71/19/2020 17:47[facility]
[place], [place] - JARROD (FARZANA) - [facility] RODRIGUE[facility].Date of Service 02/19/2020 17:47HPI/Events of
 Note EICU note:D/W bedside RNPatient remained on 4Mcg levophed at time of our conversation, off vaso
pressin.I started the patinet on po midodrine.She looks very debilitated on camera, with ng tube in p
lace.Magnesium replaced this am.Patient needs to increase activity in the am if at all possible.Patie
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  Now on Merrem and Vanco.Patient appears very fragile.W
ould consider adding palliative care for symptom management from [place].Awaiting urine culture.Not palma
e we need Vancomycin, will await de-escalation.Continue fluids, and pain controlCautious use of ultra
m as it can alter sensorium.Will monitor, and discuss in am.Please call as neededInterventions Major-
Sepsis - evaluation and managementMinor-Communication with other healthcare providers and/or family, 
Routine modifications to care plan (e.g. PRN medications for pain, fever)Electronically Signed by: TRAVIS MATTHEW () on 02/19/2020 18:05

## 2020-02-20 LAB
BUN SERPL-MCNC: 25 MG/DL (ref 7–18)
CHLORIDE SERPL-SCNC: 112 MMOL/L (ref 98–107)
CO2 SERPL-SCNC: 15.1 MMOL/L (ref 21–32)
GLUCOSE SERPL-MCNC: 81 MG/DL (ref 74–106)
POTASSIUM SERPL-SCNC: 3.8 MMOL/L (ref 3.5–5.1)
SODIUM SERPL-SCNC: 143 MMOL/L (ref 136–145)

## 2020-02-20 RX ADMIN — HYDROCODONE BITARTRATE AND ACETAMINOPHEN PRN TAB: 5; 325 TABLET ORAL at 23:41

## 2020-02-20 RX ADMIN — SODIUM CHLORIDE SCH MLS/HR: 9 INJECTION, SOLUTION INTRAMUSCULAR; INTRAVENOUS; SUBCUTANEOUS at 08:56

## 2020-02-20 RX ADMIN — HYDROCODONE BITARTRATE AND ACETAMINOPHEN PRN TAB: 5; 325 TABLET ORAL at 01:06

## 2020-02-20 RX ADMIN — ONDANSETRON PRN MG: 2 INJECTION, SOLUTION INTRAMUSCULAR; INTRAVENOUS at 12:22

## 2020-02-20 RX ADMIN — HYDROCORTISONE SODIUM SUCCINATE SCH: 100 INJECTION, POWDER, FOR SOLUTION INTRAMUSCULAR; INTRAVENOUS at 11:45

## 2020-02-20 RX ADMIN — HYDROCODONE BITARTRATE AND ACETAMINOPHEN PRN TAB: 5; 325 TABLET ORAL at 08:59

## 2020-02-20 RX ADMIN — PROMETHAZINE HYDROCHLORIDE PRN MG: 25 INJECTION INTRAMUSCULAR; INTRAVENOUS at 18:38

## 2020-02-20 RX ADMIN — HYDROCORTISONE SODIUM SUCCINATE SCH MG: 100 INJECTION, POWDER, FOR SOLUTION INTRAMUSCULAR; INTRAVENOUS at 02:46

## 2020-02-20 NOTE — PCM.PN
- General Info


Date of Service: 02/20/20


Subjective Update: 





No acute events overnight. Off Norepi this morning. BP and MAp stable. Feels 

better. No chest pain, dyspnea, abdominal pain. Has been eating.





- Patient Data


Vitals - Most Recent: 


 Last Vital Signs











Temp  36.2 C   02/20/20 08:00


 


Pulse  108 H  02/18/20 19:29


 


Resp  24 H  02/20/20 10:00


 


BP  104/54 L  02/20/20 10:00


 


Pulse Ox  95   02/20/20 10:00











Weight - Most Recent: 48.761 kg


I&O - Last 24 Hours: 


 Intake & Output











 02/19/20 02/20/20 02/20/20





 22:59 06:59 14:59


 


Intake Total 2350 1708 


 


Output Total 0  


 


Balance 2350 1708 











Lab Results Last 24 Hours: 


 Laboratory Results - last 24 hr











  02/19/20 02/20/20 02/20/20 Range/Units





  10:25 05:10 05:10 


 


WBC   30.93 H   (4.0-11.0)  K/uL


 


RBC   2.95 L   (4.30-5.90)  M/uL


 


Hgb   8.9 L   (12.0-16.0)  g/dL


 


Hct   27.8 L   (36.0-46.0)  %


 


MCV   94.2   (80.0-98.0)  fL


 


MCH   30.2   (27.0-32.0)  pg


 


MCHC   32.0   (31.0-37.0)  g/dL


 


RDW Std Deviation   52.9   (28.0-62.0)  fl


 


RDW Coeff of Rai   15   (11.0-15.0)  %


 


Plt Count   255   (150-400)  K/uL


 


MPV   10.00   (7.40-12.00)  fL


 


Neut % (Auto)   95.0 H   (48.0-80.0)  %


 


Lymph % (Auto)   2.8 L   (16.0-40.0)  %


 


Mono % (Auto)   2.1   (0.0-15.0)  %


 


Eos % (Auto)   0.0   (0.0-7.0)  %


 


Baso % (Auto)   0.1   (0.0-1.5)  %


 


Neut # (Auto)   29.4 H   (1.4-5.7)  K/uL


 


Lymph # (Auto)   0.9   (0.6-2.4)  K/uL


 


Mono # (Auto)   0.7   (0.0-0.8)  K/uL


 


Eos # (Auto)   0.0   (0.0-0.7)  K/uL


 


Baso # (Auto)   0.0   (0.0-0.1)  K/uL


 


Nucleated RBC %   0.0   /100WBC


 


Nucleated RBCs #   0   K/uL


 


Sodium    143  (136-145)  mmol/L


 


Potassium    3.8  (3.5-5.1)  mmol/L


 


Chloride    112 H  ()  mmol/L


 


Carbon Dioxide    15.1 L  (21.0-32.0)  mmol/L


 


BUN    25 H  (7.0-18.0)  mg/dL


 


Creatinine    0.9  (0.6-1.0)  mg/dL


 


Est Cr Clr Drug Dosing    35.74  mL/min


 


Estimated GFR (MDRD)    59.8  ml/min


 


Glucose    81  ()  mg/dL


 


Calcium    7.6 L  (8.5-10.1)  mg/dL


 


Magnesium    2.4  (1.8-2.4)  mg/dL


 


Total Bilirubin    0.3  (0.2-1.0)  mg/dL


 


AST    23  (15-37)  IU/L


 


ALT    20  (14-63)  IU/L


 


Alkaline Phosphatase    99  ()  U/L


 


Total Protein    5.0 L  (6.4-8.2)  g/dL


 


Albumin    1.8 L  (3.4-5.0)  g/dL


 


Globulin    3.2  (2.6-4.0)  g/dL


 


Albumin/Globulin Ratio    0.6 L  (0.9-1.6)  


 


Adenovirus (PCR)  Not Detected    (Not Detected)  


 


B. pertussis DNA (PCR)  Not Detected    (Not Detected)  


 


B.parapertussis DNA PCR  Not Detected    (Not Detected)  


 


C. pneumoniae DNA (PCR)  Not Detected    (Not Detected)  


 


Coronavirus (PCR)  Not Detected    (Not Detected)  


 


Human Metapneumovir PCR  Not Detected    (Not Detected)  


 


Influenza A (RT-PCR)  Not Detected    (Not Detected)  


 


Influenza B (RT-PCR)  Not Detected    (Not Detected)  


 


M. pneumoniae (PCR)  Not Detected    (Not Detected)  


 


Parainfluen 1,2,3,4 PCR  Not Detected    (Not Detected)  


 


RSV (PCR)  Not Detected    (Not Detected)  


 


Entero/Rhino (PCR)  Not Detected    (Not Detected)  











Jose J Results Last 24 Hours: 


 Microbiology











 02/18/20 12:55 Streptococcus pneumoniae Antigen (M - Final





 Urine 


 


 02/18/20 12:55 Legionella Urinary Antigen - Final





 Urine 


 


 02/18/20 12:55 Urine Culture - Final





 Urine, Clean Catch    Escherichia Coli


 


 02/18/20 13:33 Aerobic Blood Culture - Preliminary





 Blood - Venous - Lab Draw    NO GROWTH AFTER 1 DAY





 Anaerobic Blood Culture - Final


 


 02/18/20 13:00 Aerobic Blood Culture - Preliminary





 Blood - Venous    NO GROWTH AFTER 1 DAY





 Anaerobic Blood Culture - Preliminary





    NO GROWTH AFTER 1 DAY


 


 02/19/20 10:25 Influenza Type A Antigen Screen - Final





 Nasopharyngeal Swab    NEGATIVE INFLUENZA A VIRUS AG





    REFERENCE RANGE: NEGATIVE





 Influenza Type B Antigen Screen - Final





    NEGATIVE INFLUENZA B VIRUS AG





    REFERENCE RANGE: NEGATIVE











Med Orders - Current: 


 Current Medications





Acetaminophen (Tylenol)  650 mg PO Q4H PRN


   PRN Reason: Pain (Mild 1-3)/fever


   Last Admin: 02/19/20 14:20 Dose:  650 mg


Hydrocodone Bitart/Acetaminophen (Norco 325-5 Mg)  1 tab PO Q6H PRN


   PRN Reason: Pain


   Last Admin: 02/20/20 08:59 Dose:  1 tab


Docusate Sodium (Colace)  100 mg PO BID PRN


   PRN Reason: Constipation


Hydrocortisone Sodium Succinate (Solu-Cortef)  100 mg IVPUSH Q8H [facility]


   Last Admin: 02/20/20 02:46 Dose:  100 mg


Norepinephrine Bitartrate (Norepinephr-0.9% Nacl 4 Mg/250)  4 mg in 250 mls @ 

7.5 mls/hr IV TITRATE HANSA; Protocol


   Last Titration: 02/20/20 07:45 Dose:  0 mcg/min, 0 mls/hr


Pantoprazole Sodium 40 mg/ (Sodium Chloride)  10 mls @ 300 mls/hr IV DAILY HANSA


   Last Admin: 02/20/20 08:56 Dose:  300 mls/hr


Ertapenem 1 gm/ Sodium (Chloride)  50 mls @ 100 mls/hr IV Q24H HANSA


   Last Admin: 02/19/20 19:47 Dose:  100 mls/hr


Vancomycin HCl 1 gm/ Sodium (Chloride)  250 mls @ 166 mls/hr IV Q24H HANSA


   Last Admin: 02/19/20 20:49 Dose:  166 mls/hr


Levofloxacin/Dextrose 750 mg/ (Premix)  150 mls @ 100 mls/hr IV Q48H [facility]


Sodium Chloride (Normal Saline)  1,000 mls @ 100 mls/hr IV ASDIRECTED [facility]


   Last Admin: 02/20/20 02:18 Dose:  100 mls/hr


Levothyroxine Sodium (Levothyroxine)  75 mcg PO ACBREAKFAST [facility]


   Last Admin: 02/20/20 06:50 Dose:  75 mcg


Midodrine (Midodrine)  10 mg PO TIDAC [facility]


   Last Admin: 02/20/20 06:50 Dose:  10 mg


Ondansetron HCl (Zofran Odt)  4 mg PO Q4H PRN


   PRN Reason: nausea, able to take PO


Ondansetron HCl (Zofran)  4 mg IVPUSH Q4H PRN


   PRN Reason: Nausea


Polyethylene Glycol (Miralax)  17 gm PO DAILY PRN


   PRN Reason: Constipation


Rivaroxaban (Xarelto)  10 mg PO DAILY [facility]


   Last Admin: 02/20/20 08:56 Dose:  10 mg


Sodium Chloride (Saline Flush)  10 ml FLUSH ASDIRECTED PRN


   PRN Reason: Keep Vein Open


Sodium Chloride (Saline Flush)  2.5 ml FLUSH ASDIRECTED PRN


   PRN Reason: Keep Vein Open


Sodium Chloride (Ocean Nasal Spray)  5 ml PEDRO Q4H PRN


   PRN Reason: Congestion


   Last Admin: 02/19/20 16:00 Dose:  5 ml


Tramadol HCl (Ultram)  100 mg PO Q8H PRN


   PRN Reason: Pain


   Last Admin: 02/19/20 22:22 Dose:  100 mg


Vancomycin HCl (Pharmacy To Dose - Vancomycin)  1 dose .XX ASDIRECTED [facility]





Discontinued Medications





Aspirin (Aspirin)  325 mg PO ONETIME ONE


   Stop: 02/18/20 17:23


   Last Admin: 02/18/20 17:45 Dose:  325 mg


Enoxaparin Sodium (Lovenox)  40 mg SUBCUT Q24H [facility]


   Last Admin: 02/18/20 21:02 Dose:  Not Given


Fentanyl (Sublimaze)  25 mcg IVPUSH Q4HR PRN


   PRN Reason: Severe pain


   Last Admin: 02/19/20 03:30 Dose:  25 mcg


Fentanyl (Fentanyl)  25 mcg IVPUSH ONETIME ONE


   Stop: 02/19/20 01:40


   Last Admin: 02/19/20 02:01 Dose:  Not Given


Fentanyl (Sublimaze)  25 mcg IVPUSH ONETIME ONE


   Stop: 02/19/20 02:01


   Last Admin: 02/19/20 02:01 Dose:  25 mcg


Hydrocortisone Sodium Succinate (Solu-Cortef)  100 mg IVPUSH ONETIME ONE


   Stop: 02/19/20 03:31


   Last Admin: 02/19/20 03:23 Dose:  100 mg


Sodium Chloride (Normal Saline)  1,000 mls @ 999 mls/hr IV .Bolus ONE


   Stop: 02/18/20 14:18


   Last Admin: 02/18/20 14:06 Dose:  999 mls/hr


Sodium Chloride (Normal Saline)  1,000 mls @ 999 mls/hr IV .Bolus ONE


   Stop: 02/18/20 14:30


   Last Admin: 02/18/20 14:06 Dose:  999 mls/hr


Meropenem 1 gm/ Sodium (Chloride)  100 mls @ 200 mls/hr IV ONETIME ONE


   Stop: 02/18/20 15:39


   Last Admin: 02/18/20 17:30 Dose:  Not Given


Sodium Chloride (Normal Saline)  1,000 mls @ 999 mls/hr IV .Bolus ONE


   Stop: 02/18/20 17:45


   Last Admin: 02/18/20 17:07 Dose:  999 mls/hr


Levofloxacin/Dextrose 750 mg/ (Premix)  150 mls @ 100 mls/hr IV ONETIME ONE


   Stop: 02/18/20 18:36


   Last Admin: 02/18/20 17:46 Dose:  100 mls/hr


Piperacillin Sod/Tazobactam (Sod 3.375 gm/ Sodium Chloride)  50 mls @ 100 mls/

hr IV Q8H HANSA


Sodium Chloride (Normal Saline)  1,000 mls @ 125 mls/hr IV CONTINUOUS HANSA


   Last Infusion: 02/19/20 09:15 Dose:  100 mls/hr


Sodium Chloride (Normal Saline)  1,000 mls @ 999 mls/hr IV .Bolus ONE


   Stop: 02/18/20 20:24


   Last Admin: 02/18/20 19:27 Dose:  999 mls/hr


Vasopressin 100 units/ Sodium (Chloride)  100 mls @ 1.8 mls/hr IV TITRATE HANSA


Vasopressin 100 units/ Sodium (Chloride)  100 mls @ 1.8 mls/hr IV CONTINUOUS [facility]


   Last Admin: 02/18/20 22:40 Dose:  0.03 units/min, 1.8 mls/hr


Magnesium Sulfate 4 gm/ Premix  100 mls @ 50 mls/hr IV ONETIME ONE


   Stop: 02/19/20 08:37


   Last Admin: 02/19/20 06:48 Dose:  50 mls/hr


Sodium Chloride (Normal Saline)  1,000 mls @ 100 mls/hr IV Q10H [facility]


   Last Admin: 02/19/20 20:56 Dose:  Not Given


Ibuprofen (Motrin)  800 mg PO Q6H PRN


   PRN Reason: Pain (mild 1-3)


Ketorolac Tromethamine (Toradol)  15 mg IV Q6H PRN


   PRN Reason: Pain (moderate 4-6)


Levothyroxine Sodium (Synthroid)  75 mcg PO ACBREAKFAST [facility]


Lidocaine (Xylocaine-Mpf 2%) Confirm Administered Dose 5 ml .ROUTE .STK-MED ONE


   Stop: 02/20/20 09:43


   Last Admin: 02/20/20 10:35 Dose:  Not Given


Lidocaine HCl (Xylocaine 1%) Confirm Administered Dose 20 ml .ROUTE .STK-MED ONE


   Stop: 02/18/20 21:44


   Last Admin: 02/18/20 22:15 Dose:  20 ml











- Exam


General: Alert, Oriented, Cooperative


Lungs: Normal Respiratory Effort, Decreased Breath Sounds, Rhonchi.  No: 

Wheezing


Cardiovascular: Regular Rate, Regular Rhythm


GI/Abdominal Exam: Normal Bowel Sounds, Soft, Non-Tender, No Distention


Extremities: Normal Inspection, No Pedal Edema


Skin: Cool





Sepsis Event Note





- Evaluation


Sepsis Screening Result: Sepsis Risk





- Focused Exam


Vital Signs: 


 Vital Signs











  Temp Resp BP BP Pulse Ox


 


 02/20/20 10:00   24 H  101/53 L  104/54 L  95


 


 02/20/20 09:00   10 L  93/52 L  97/47 L  93 L


 


 02/20/20 08:00  36.2 C  22 H  81/41 L  87/47 L  92 L


 


 02/20/20 07:00   17  98/52 L  96/54 L  95


 


 02/20/20 06:00   16  105/55 L  96/58 L  94 L


 


 02/20/20 05:00   16  95/65  104/54 L  94 L


 


 02/20/20 04:00  36.8 C  16  98/51 L  99/51 L  96


 


 02/20/20 03:00   16  90/53 L  101/51 L  93 L


 


 02/20/20 02:00   16  98/55 L  99/48 L  93 L


 


 02/20/20 01:00   16  98/51 L  107/52 L  94 L


 


 02/20/20 00:00  36.9 C  16  99/42 L  98/57 L  96


 


 02/19/20 23:00   17  93/49 L  104/48 L  98











Date Exam was Performed: 02/20/20


Time Exam was Performed: 10:39





- Problem List Review


Problem List Initiated/Reviewed/Updated: Yes





- My Orders


Last 24 Hours: 


My Active Orders





02/19/20 19:45


Sodium Chloride 0.9% [Normal Saline] 1,000 ml IV ASDIRECTED 





02/21/20 05:11


CBC WITH AUTO DIFF [HEME] AM 


COMPREHENSIVE METABOLIC PN,CMP [CHEM] AM 














- Plan


Plan:: 





A:


1. Sepsis 2/2 multifocal pneumonia and UTI


2. Leukocytosis due to above


3. 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





P:


Overall, improving. Will monitor today off IV pressor. Continue Midodrine for 

now. DC IV fluids. Encourage PO intake. Continue Ertapenem, Vancomycin and 

Levaquin for now. Will transfer to floor later today if maintaining adequate BP

, MAP. Will wean off steroids.





Dispo: 2-3 days.

## 2020-02-20 NOTE — PN
THC Physician - Brief Progress [id number]/20/2020 00:53[facility]
[place], [place] - JARROD (FARZANA) - FLOWER GUDate of Service 02/20/2020 00:53HPI/Events of
 Note RN just called to let us know that patient dropped her SBP to 80Levophed was at 5 ladi/min at th
e start of RN's shiftThis was weaned off but just 20-25 min back, BP droppedOn camera she is comforta
bleNo tachycardia, frail appearing and map is currently 70 on 2 ladi of levophedContinue to maintain m
ap > 65 or SBP > 90Interventions Major-Shock - evaluation and managementElectronically Signed by: Margarito Nguyen) on 02/20/2020 00:54

## 2020-02-21 LAB
BUN SERPL-MCNC: 30 MG/DL (ref 7–18)
BUN SERPL-MCNC: 35 MG/DL (ref 7–18)
CHLORIDE SERPL-SCNC: 112 MMOL/L (ref 98–107)
CHLORIDE SERPL-SCNC: 114 MMOL/L (ref 98–107)
CO2 SERPL-SCNC: 14.3 MMOL/L (ref 21–32)
CO2 SERPL-SCNC: 18.1 MMOL/L (ref 21–32)
GLUCOSE SERPL-MCNC: 107 MG/DL (ref 74–106)
GLUCOSE SERPL-MCNC: 71 MG/DL (ref 74–106)
POTASSIUM SERPL-SCNC: 3.7 MMOL/L (ref 3.5–5.1)
POTASSIUM SERPL-SCNC: 4 MMOL/L (ref 3.5–5.1)
SODIUM SERPL-SCNC: 143 MMOL/L (ref 136–145)
SODIUM SERPL-SCNC: 146 MMOL/L (ref 136–145)

## 2020-02-21 RX ADMIN — HYDROCODONE BITARTRATE AND ACETAMINOPHEN PRN TAB: 5; 325 TABLET ORAL at 06:10

## 2020-02-21 RX ADMIN — PROMETHAZINE HYDROCHLORIDE PRN MG: 25 INJECTION INTRAMUSCULAR; INTRAVENOUS at 11:24

## 2020-02-21 RX ADMIN — PROMETHAZINE HYDROCHLORIDE PRN MG: 25 INJECTION INTRAMUSCULAR; INTRAVENOUS at 19:57

## 2020-02-21 RX ADMIN — MEROPENEM AND SODIUM CHLORIDE SCH MLS/HR: 1 INJECTION, SOLUTION INTRAVENOUS at 10:44

## 2020-02-21 RX ADMIN — MEROPENEM AND SODIUM CHLORIDE SCH MLS/HR: 1 INJECTION, SOLUTION INTRAVENOUS at 22:41

## 2020-02-21 RX ADMIN — HYDROCORTISONE SODIUM SUCCINATE SCH MG: 100 INJECTION, POWDER, FOR SOLUTION INTRAMUSCULAR; INTRAVENOUS at 08:17

## 2020-02-21 RX ADMIN — HYDROCORTISONE SODIUM SUCCINATE SCH MG: 100 INJECTION, POWDER, FOR SOLUTION INTRAMUSCULAR; INTRAVENOUS at 19:55

## 2020-02-21 NOTE — CR
Chest: Portable view of the chest is obtained.

 

Comparison: Prior chest x-ray of 02/18/20.

 

Increasing density is seen within both lung bases from prior study.  

Right sided jugular line is noted with tip lying within the right 

atria.  Heart size appears within normal limits for portable 

technique.  Slight tortuosity of the thoracic aorta is noted.  

Degenerative change is noted within the right shoulder.  Scoliosis and

 mild degenerative change is scattered within the spine.

 

Impression:

1.  Increasing parenchymal change within both lung bases.  Findings 

may represent worsening pneumonia as well as worsening changes of 

aspiration.  Please correlate.

2.  Right jugular line with tip lying in the area of the right atria.

3.  Other findings believed to be incidental.

 

Diagnostic code #3

 

This report was dictated in [place] Standard Time

## 2020-02-21 NOTE — PCM.PN
<Luis Aguilar - Last Filed: 02/21/20 20:17>





- General Info


Date of Service: 02/21/20


Subjective Update: 





No acute events overnight. Denies chest pain, no abdominal pain. Poor appetite.





- Patient Data


Vitals - Most Recent: 


 Last Vital Signs











Temp  36.2 C   02/21/20 12:00


 


Pulse  108 H  02/18/20 19:29


 


Resp  29 H  02/21/20 14:00


 


BP  122/63   02/21/20 14:00


 


Pulse Ox  93 L  02/21/20 14:00











Weight - Most Recent: 48.807 kg


I&O - Last 24 Hours: 


 Intake & Output











 02/21/20 02/21/20 02/21/20





 06:59 14:59 22:59


 


Intake Total 1169 168 


 


Output Total 0  


 


Balance 1169 168 











Lab Results Last 24 Hours: 


 Laboratory Results - last 24 hr











  02/21/20 02/21/20 Range/Units





  05:58 05:58 


 


WBC  30.25 H   (4.0-11.0)  K/uL


 


RBC  3.04 L   (4.30-5.90)  M/uL


 


Hgb  9.2 L   (12.0-16.0)  g/dL


 


Hct  28.8 L   (36.0-46.0)  %


 


MCV  94.7   (80.0-98.0)  fL


 


MCH  30.3   (27.0-32.0)  pg


 


MCHC  31.9   (31.0-37.0)  g/dL


 


RDW Std Deviation  53.3   (28.0-62.0)  fl


 


RDW Coeff of Rai  15   (11.0-15.0)  %


 


Plt Count  245   (150-400)  K/uL


 


MPV  10.00   (7.40-12.00)  fL


 


Add Manual Diff  YES   


 


Neutrophils % (Manual)  93 H   (48.0-80.0)  %


 


Lymphocytes % (Manual)  5 L   (16.0-40.0)  %


 


Monocytes % (Manual)  2   (0.0-15.0)  %


 


Nucleated RBC %  0.0   /100WBC


 


Absolute Seg Neuts  28.1 H   (1.4-5.7)  


 


Lymphocytes # (Manual)  1.5   (0.6-2.4)  


 


Monocytes # (Manual)  0.6   (0.0-0.8)  


 


Nucleated RBCs #  0   K/uL


 


Sodium   146 H  (136-145)  mmol/L


 


Potassium   3.7  (3.5-5.1)  mmol/L


 


Chloride   114 H  ()  mmol/L


 


Carbon Dioxide   18.1 L  (21.0-32.0)  mmol/L


 


BUN   30 H  (7.0-18.0)  mg/dL


 


Creatinine   0.9  (0.6-1.0)  mg/dL


 


Est Cr Clr Drug Dosing   35.74  mL/min


 


Estimated GFR (MDRD)   59.8  ml/min


 


Glucose   71 L  ()  mg/dL


 


Calcium   8.1 L  (8.5-10.1)  mg/dL


 


Total Bilirubin   0.3  (0.2-1.0)  mg/dL


 


AST   15  (15-37)  IU/L


 


ALT   18  (14-63)  IU/L


 


Alkaline Phosphatase   90  ()  U/L


 


Total Protein   4.9 L  (6.4-8.2)  g/dL


 


Albumin   1.7 L  (3.4-5.0)  g/dL


 


Globulin   3.2  (2.6-4.0)  g/dL


 


Albumin/Globulin Ratio   0.5 L  (0.9-1.6)  











Jose J Results Last 24 Hours: 


 Microbiology











 02/18/20 13:33 Aerobic Blood Culture - Preliminary





 Blood - Venous - Lab Draw    NO GROWTH AFTER 2 DAYS





 Anaerobic Blood Culture - Final


 


 02/18/20 13:00 Aerobic Blood Culture - Preliminary





 Blood - Venous    NO GROWTH AFTER 2 DAYS





 Anaerobic Blood Culture - Preliminary





    NO GROWTH AFTER 2 DAYS











Med Orders - Current: 


 Current Medications





Acetaminophen (Tylenol)  650 mg PO Q4H PRN


   PRN Reason: Pain (Mild 1-3)/fever


   Last Admin: 02/19/20 14:20 Dose:  650 mg


Hydrocodone Bitart/Acetaminophen (Norco 325-5 Mg)  1 tab PO Q6H PRN


   PRN Reason: Pain


   Last Admin: 02/21/20 06:10 Dose:  1 tab


Albuterol/Ipratropium (Duoneb 3.0-0.5 Mg/3 Ml)  3 ml NEB Q4HRRT PRN


   PRN Reason: Shortness of Breath


Docusate Sodium (Colace)  100 mg PO BID PRN


   PRN Reason: Constipation


Famotidine (Pepcid)  20 mg PO BID [facility]


   Last Admin: 02/21/20 08:09 Dose:  20 mg


Hydrocortisone Sodium Succinate (Solu-Cortef)  100 mg IVPUSH Q12H [facility]


   Last Admin: 02/21/20 08:17 Dose:  100 mg


Norepinephrine Bitartrate (Norepinephr-0.9% Nacl 4 Mg/250)  4 mg in 250 mls @ 

7.5 mls/hr IV TITRATE [facility]; Protocol


   Last Titration: 02/20/20 07:45 Dose:  0 mcg/min, 0 mls/hr


Sodium Chloride (Normal Saline)  1,000 mls @ 75 mls/hr IV ASDIRECTED [facility]


   Last Admin: 02/21/20 10:54 Dose:  75 mls/hr


Levofloxacin/Dextrose 750 mg/ (Premix)  150 mls @ 100 mls/hr IV Q48H [facility]


   Last Admin: 02/21/20 11:44 Dose:  100 mls/hr


Meropenem/Sodium Chloride 1 gm (/ Premix)  50 mls @ 100 mls/hr IV Q12H [facility]


   Last Admin: 02/21/20 10:44 Dose:  100 mls/hr


Vancomycin HCl 0.75 gm/ Sodium (Chloride)  250 mls @ 250 mls/hr IV Q24H [facility]


   Last Admin: 02/21/20 13:47 Dose:  250 mls/hr


Levothyroxine Sodium (Levothyroxine)  75 mcg PO ACBREAKFAST [facility]


   Last Admin: 02/21/20 08:09 Dose:  75 mcg


Midodrine (Midodrine)  10 mg PO TIDAC [facility]


   Last Admin: 02/21/20 10:44 Dose:  10 mg


Ondansetron HCl (Zofran Odt)  4 mg PO Q4H PRN


   PRN Reason: nausea, able to take PO


Ondansetron HCl (Zofran)  4 mg IVPUSH Q4H PRN


   PRN Reason: Nausea


   Last Admin: 02/20/20 12:22 Dose:  4 mg


Polyethylene Glycol (Miralax)  17 gm PO DAILY PRN


   PRN Reason: Constipation


Promethazine HCl (Phenergan)  25 mg PO Q8H PRN


   PRN Reason: Nausea/Vomiting


   Last Admin: 02/20/20 17:42 Dose:  25 mg


Promethazine HCl (Phenergan)  12.5 mg IM Q6H PRN


   PRN Reason: Nausea


   Last Admin: 02/21/20 11:24 Dose:  12.5 mg


Rivaroxaban (Xarelto)  10 mg PO DAILY [facility]


   Last Admin: 02/21/20 08:09 Dose:  10 mg


Sodium Chloride (Saline Flush)  10 ml FLUSH ASDIRECTED PRN


   PRN Reason: Keep Vein Open


Sodium Chloride (Saline Flush)  2.5 ml FLUSH ASDIRECTED PRN


   PRN Reason: Keep Vein Open


Sodium Chloride (Ocean Nasal Spray)  5 ml PEDRO Q4H PRN


   PRN Reason: Congestion


   Last Admin: 02/21/20 11:42 Dose:  5 ml


Tramadol HCl (Ultram)  100 mg PO Q8H PRN


   PRN Reason: Pain


   Last Admin: 02/21/20 03:26 Dose:  100 mg





Discontinued Medications





Albuterol/Ipratropium (Duoneb 3.0-0.5 Mg/3 Ml)  3 ml NEB ONETIME ONE


   Stop: 02/21/20 11:52


   Last Admin: 02/21/20 12:20 Dose:  3 ml


Aspirin (Aspirin)  325 mg PO ONETIME ONE


   Stop: 02/18/20 17:23


   Last Admin: 02/18/20 17:45 Dose:  325 mg


Enoxaparin Sodium (Lovenox)  40 mg SUBCUT Q24H [facility]


   Last Admin: 02/18/20 21:02 Dose:  Not Given


Fentanyl (Sublimaze)  25 mcg IVPUSH Q4HR PRN


   PRN Reason: Severe pain


   Last Admin: 02/19/20 03:30 Dose:  25 mcg


Fentanyl (Fentanyl)  25 mcg IVPUSH ONETIME ONE


   Stop: 02/19/20 01:40


   Last Admin: 02/19/20 02:01 Dose:  Not Given


Fentanyl (Sublimaze)  25 mcg IVPUSH ONETIME ONE


   Stop: 02/19/20 02:01


   Last Admin: 02/19/20 02:01 Dose:  25 mcg


Hydrocortisone Sodium Succinate (Solu-Cortef)  100 mg IVPUSH Q8H [facility]


   Last Admin: 02/20/20 11:45 Dose:  Not Given


Hydrocortisone Sodium Succinate (Solu-Cortef)  100 mg IVPUSH ONETIME ONE


   Stop: 02/19/20 03:31


   Last Admin: 02/19/20 03:23 Dose:  100 mg


Sodium Chloride (Normal Saline)  1,000 mls @ 999 mls/hr IV .Bolus ONE


   Stop: 02/18/20 14:18


   Last Admin: 02/18/20 14:06 Dose:  999 mls/hr


Sodium Chloride (Normal Saline)  1,000 mls @ 999 mls/hr IV .Bolus ONE


   Stop: 02/18/20 14:30


   Last Admin: 02/18/20 14:06 Dose:  999 mls/hr


Meropenem 1 gm/ Sodium (Chloride)  100 mls @ 200 mls/hr IV ONETIME ONE


   Stop: 02/18/20 15:39


   Last Admin: 02/18/20 17:30 Dose:  Not Given


Sodium Chloride (Normal Saline)  1,000 mls @ 999 mls/hr IV .Bolus ONE


   Stop: 02/18/20 17:45


   Last Admin: 02/18/20 17:07 Dose:  999 mls/hr


Levofloxacin/Dextrose 750 mg/ (Premix)  150 mls @ 100 mls/hr IV ONETIME ONE


   Stop: 02/18/20 18:36


   Last Admin: 02/18/20 17:46 Dose:  100 mls/hr


Piperacillin Sod/Tazobactam (Sod 3.375 gm/ Sodium Chloride)  50 mls @ 100 mls/

hr IV Q8H [facility]


Sodium Chloride (Normal Saline)  1,000 mls @ 125 mls/hr IV CONTINUOUS [facility]


   Last Infusion: 02/19/20 09:15 Dose:  100 mls/hr


Pantoprazole Sodium 40 mg/ (Sodium Chloride)  10 mls @ 300 mls/hr IV DAILY [facility]


   Last Admin: 02/20/20 08:56 Dose:  300 mls/hr


Sodium Chloride (Normal Saline)  1,000 mls @ 999 mls/hr IV .Bolus ONE


   Stop: 02/18/20 20:24


   Last Admin: 02/18/20 19:27 Dose:  999 mls/hr


Ertapenem 1 gm/ Sodium (Chloride)  50 mls @ 100 mls/hr IV Q24H [facility]


   Last Admin: 02/19/20 19:47 Dose:  100 mls/hr


Vancomycin HCl 1 gm/ Sodium (Chloride)  250 mls @ 166 mls/hr IV Q24H [facility]


   Last Admin: 02/19/20 20:49 Dose:  166 mls/hr


Vasopressin 100 units/ Sodium (Chloride)  100 mls @ 1.8 mls/hr IV TITRATE [facility]


Vasopressin 100 units/ Sodium (Chloride)  100 mls @ 1.8 mls/hr IV CONTINUOUS [facility]


   Last Admin: 02/18/20 22:40 Dose:  0.03 units/min, 1.8 mls/hr


Magnesium Sulfate 4 gm/ Premix  100 mls @ 50 mls/hr IV ONETIME ONE


   Stop: 02/19/20 08:37


   Last Admin: 02/19/20 06:48 Dose:  50 mls/hr


Levofloxacin/Dextrose 750 mg/ (Premix)  150 mls @ 100 mls/hr IV Q48H [facility]


Sodium Chloride (Normal Saline)  1,000 mls @ 100 mls/hr IV Q10H [facility]


   Last Admin: 02/19/20 20:56 Dose:  Not Given


Sodium Chloride (Normal Saline)  1,000 mls @ 100 mls/hr IV ASDIRECTED [facility]


   Last Admin: 02/20/20 02:18 Dose:  100 mls/hr


Ceftriaxone Sodium/Dextrose 1 (gm/ Premix)  50 mls @ 100 mls/hr IV Q24H [facility]


   Last Admin: 02/20/20 13:25 Dose:  100 mls/hr


Azithromycin 250 mg/ Sodium (Chloride)  250 mls @ 250 mls/hr IV Q24H [facility]


   Last Admin: 02/20/20 12:00 Dose:  250 mls/hr


Lactated Ringer's (Ringers, Lactated)  1,000 mls @ 75 mls/hr IV ASDIRECTED [facility]


   Last Admin: 02/21/20 06:05 Dose:  75 mls/hr


Sodium Chloride (Normal Saline)  1,000 mls @ 999 mls/hr IV STAT ONE


   Stop: 02/21/20 10:14


   Last Admin: 02/21/20 10:14 Dose:  999 mls/hr


Meropenem 1 gm/ Sodium (Chloride)  50 mls @ 100 mls/hr IV Q12H [facility]


   Last Admin: 02/21/20 10:52 Dose:  Not Given


Ibuprofen (Motrin)  800 mg PO Q6H PRN


   PRN Reason: Pain (mild 1-3)


Ketorolac Tromethamine (Toradol)  15 mg IV Q6H PRN


   PRN Reason: Pain (moderate 4-6)


Levothyroxine Sodium (Synthroid)  75 mcg PO ACBREAKFAST [facility]


Lidocaine (Xylocaine-Mpf 2%) Confirm Administered Dose 5 ml .ROUTE .STK-MED ONE


   Stop: 02/20/20 09:43


   Last Admin: 02/20/20 10:35 Dose:  Not Given


Lidocaine HCl (Xylocaine 1%) Confirm Administered Dose 20 ml .ROUTE .STK-MED ONE


   Stop: 02/18/20 21:44


   Last Admin: 02/18/20 22:15 Dose:  20 ml


Vancomycin HCl (Pharmacy To Dose - Vancomycin)  1 dose .XX ASDIRECTED [facility]


Vancomycin HCl (Pharmacy To Dose - Vancomycin)  1 dose .XX ASDIRECTED [facility]











- Exam


Quality Assessment: Supplemental Oxygen


General: Alert, Oriented, Cooperative, No Acute Distress


Lungs: Crackles, Rhonchi


Cardiovascular: Regular Rate, Irregular Rhythm


GI/Abdominal Exam: Normal Bowel Sounds, Soft, Non-Tender


Extremities: Normal Inspection, No Pedal Edema


Skin: Warm, Dry





Sepsis Event Note





- Evaluation


Sepsis Screening Result: Sepsis Risk





- Focused Exam


Vital Signs: 


 Vital Signs











  Temp Resp BP BP Pulse Ox


 


 02/21/20 14:00   29 H  105/54 L  122/63  93 L


 


 02/21/20 13:00   24 H  95/67  100/52 L  94 L


 


 02/21/20 12:00  36.2 C  29 H  85/61 L  92/52 L  87 L


 


 02/21/20 11:00   26 H   112/67  91 L


 


 02/21/20 10:00   26 H   103/53 L  84 L


 


 02/21/20 09:00   23 H  86/43 L  85/46 L  89 L


 


 02/21/20 08:00  36.2 C  25 H  80/46 L  86/48 L  90 L











Date Exam was Performed: 02/21/20


Time Exam was Performed: 20:17





- Problem List Review


Problem List Initiated/Reviewed/Updated: Yes





- My Orders


Last 24 Hours: 


My Active Orders





02/20/20 17:33


Promethazine [Phenergan]   25 mg PO Q8H PRN 





02/20/20 17:37


Dietary Supplements [RC] TIDMEALS 





02/20/20 18:23


Promethazine [Phenergan]   12.5 mg IM Q6H PRN 





02/21/20 07:45


Sodium Chloride 0.9% [Normal Saline] 1,000 ml IV ASDIRECTED 





02/21/20 08:09


Transfer Patient (Change bed) [ADT] Routine 





02/21/20 08:15


Hydrocortisone Sod Succinate [Solu-CORTEF]   100 mg IVPUSH Q12H 





02/21/20 11:51


RT Aerosol Therapy [RC] ASDIRECTED 


RT Aerosol Therapy [RC] ASDIRECTED 


Albuterol/Ipratropium [DuoNeb 3.0-0.5 MG/3 ML]   3 ml NEB Q4HRRT PRN 





02/21/20 12:15


Consult to Hospice [CONS] Routine 





02/22/20 05:11


CBC WITH AUTO DIFF [HEME] AM 


COMPREHENSIVE METABOLIC PN,CMP [CHEM] AM 





02/23/20 05:11


CBC WITH AUTO DIFF [HEME] AM 


COMPREHENSIVE METABOLIC PN,CMP [CHEM] AM 





02/24/20 05:11


CBC WITH AUTO DIFF [HEME] AM 


COMPREHENSIVE METABOLIC PN,CMP [CHEM] AM 














- Plan


Plan:: 





A:


1. Sepsis 2/2 multifocal pneumonia and UTI


2. Leukocytosis due to above


3. 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





P:


Will escalate to broad spectrum antibiotics since chest xray shows worsening 

pneumonia. Will continue with maintenance fluids for now and monitor her BP. 

Titrate O2 as tolerated.  Will plan to downgrade tomorrow if maintaining BP and 

oxygenation.





Dispo: 2-3 days.





<Amparo Mayo - Last Filed: 03/03/20 11:44>





- Patient Data


Vitals - Most Recent: 


 Last Vital Signs











Temp  36.4 C   02/22/20 14:00


 


Pulse  105 H  02/22/20 14:00


 


Resp  40 H  02/22/20 14:00


 


BP  139/79   02/22/20 14:00


 


Pulse Ox  92 L  02/22/20 14:00











Med Orders - Current: 


 Current Medications








Discontinued Medications





Acetaminophen (Tylenol)  650 mg PO Q4H PRN


   PRN Reason: Pain (Mild 1-3)/fever


   Last Admin: 02/19/20 14:20 Dose:  650 mg


Hydrocodone Bitart/Acetaminophen (Norco 325-5 Mg)  1 tab PO Q6H PRN


   PRN Reason: Pain


   Last Admin: 02/21/20 06:10 Dose:  1 tab


Albuterol/Ipratropium (Duoneb 3.0-0.5 Mg/3 Ml)  3 ml NEB ONETIME ONE


   Stop: 02/21/20 11:52


   Last Admin: 02/21/20 12:20 Dose:  3 ml


Albuterol/Ipratropium (Duoneb 3.0-0.5 Mg/3 Ml)  3 ml NEB Q4HRRT PRN


   PRN Reason: Shortness of Breath


   Last Admin: 02/22/20 14:20 Dose:  3 ml


Aspirin (Aspirin)  325 mg PO ONETIME ONE


   Stop: 02/18/20 17:23


   Last Admin: 02/18/20 17:45 Dose:  325 mg


Atropine Sulfate (Atropine 1% Ophth Soln)  0 ml SL Q1H PRN


   PRN Reason: Other


Dextrose/Water (Dextrose 50% In Water)  50 ml IVPUSH ONETIME STA


   Stop: 02/21/20 22:03


   Last Admin: 02/21/20 22:08 Dose:  50 ml


Docusate Sodium (Colace)  100 mg PO BID PRN


   PRN Reason: Constipation


Enoxaparin Sodium (Lovenox)  40 mg SUBCUT Q24H [facility]


   Last Admin: 02/18/20 21:02 Dose:  Not Given


Famotidine (Pepcid)  20 mg PO BID [facility]


   Last Admin: 02/24/20 11:05 Dose:  Not Given


Fentanyl (Sublimaze)  25 mcg IVPUSH Q4HR PRN


   PRN Reason: Severe pain


   Last Admin: 02/19/20 03:30 Dose:  25 mcg


Fentanyl (Fentanyl)  25 mcg IVPUSH ONETIME ONE


   Stop: 02/19/20 01:40


   Last Admin: 02/19/20 02:01 Dose:  Not Given


Fentanyl (Sublimaze)  25 mcg IVPUSH ONETIME ONE


   Stop: 02/19/20 02:01


   Last Admin: 02/19/20 02:01 Dose:  25 mcg


Hydrocortisone Sodium Succinate (Solu-Cortef)  100 mg IVPUSH Q8H [facility]


   Last Admin: 02/20/20 11:45 Dose:  Not Given


Hydrocortisone Sodium Succinate (Solu-Cortef)  100 mg IVPUSH ONETIME ONE


   Stop: 02/19/20 03:31


   Last Admin: 02/19/20 03:23 Dose:  100 mg


Hydrocortisone Sodium Succinate (Solu-Cortef)  100 mg IVPUSH Q12H [facility]


   Last Admin: 02/22/20 08:11 Dose:  100 mg


Sodium Chloride (Normal Saline)  1,000 mls @ 999 mls/hr IV .Bolus ONE


   Stop: 02/18/20 14:18


   Last Admin: 02/18/20 14:06 Dose:  999 mls/hr


Sodium Chloride (Normal Saline)  1,000 mls @ 999 mls/hr IV .Bolus ONE


   Stop: 02/18/20 14:30


   Last Admin: 02/18/20 14:06 Dose:  999 mls/hr


Norepinephrine Bitartrate (Norepinephr-0.9% Nacl 4 Mg/250)  4 mg in 250 mls @ 

7.5 mls/hr IV TITRATE [facility]; Protocol


   Last Titration: 02/20/20 07:45 Dose:  0 mcg/min, 0 mls/hr


Meropenem 1 gm/ Sodium (Chloride)  100 mls @ 200 mls/hr IV ONETIME ONE


   Stop: 02/18/20 15:39


   Last Admin: 02/18/20 17:30 Dose:  Not Given


Sodium Chloride (Normal Saline)  1,000 mls @ 999 mls/hr IV .Bolus ONE


   Stop: 02/18/20 17:45


   Last Admin: 02/18/20 17:07 Dose:  999 mls/hr


Levofloxacin/Dextrose 750 mg/ (Premix)  150 mls @ 100 mls/hr IV ONETIME ONE


   Stop: 02/18/20 18:36


   Last Admin: 02/18/20 17:46 Dose:  100 mls/hr


Piperacillin Sod/Tazobactam (Sod 3.375 gm/ Sodium Chloride)  50 mls @ 100 mls/

hr IV Q8H [facility]


Sodium Chloride (Normal Saline)  1,000 mls @ 125 mls/hr IV CONTINUOUS [facility]


   Last Infusion: 02/19/20 09:15 Dose:  100 mls/hr


Pantoprazole Sodium 40 mg/ (Sodium Chloride)  10 mls @ 300 mls/hr IV DAILY [facility]


   Last Admin: 02/20/20 08:56 Dose:  300 mls/hr


Sodium Chloride (Normal Saline)  1,000 mls @ 999 mls/hr IV .Bolus ONE


   Stop: 02/18/20 20:24


   Last Admin: 02/18/20 19:27 Dose:  999 mls/hr


Ertapenem 1 gm/ Sodium (Chloride)  50 mls @ 100 mls/hr IV Q24H [facility]


   Last Admin: 02/19/20 19:47 Dose:  100 mls/hr


Vancomycin HCl 1 gm/ Sodium (Chloride)  250 mls @ 166 mls/hr IV Q24H [facility]


   Last Admin: 02/19/20 20:49 Dose:  166 mls/hr


Vasopressin 100 units/ Sodium (Chloride)  100 mls @ 1.8 mls/hr IV TITRATE [facility]


Vasopressin 100 units/ Sodium (Chloride)  100 mls @ 1.8 mls/hr IV CONTINUOUS [facility]


   Last Admin: 02/18/20 22:40 Dose:  0.03 units/min, 1.8 mls/hr


Magnesium Sulfate 4 gm/ Premix  100 mls @ 50 mls/hr IV ONETIME ONE


   Stop: 02/19/20 08:37


   Last Admin: 02/19/20 06:48 Dose:  50 mls/hr


Levofloxacin/Dextrose 750 mg/ (Premix)  150 mls @ 100 mls/hr IV Q48H [facility]


Sodium Chloride (Normal Saline)  1,000 mls @ 100 mls/hr IV Q10H [facility]


   Last Admin: 02/19/20 20:56 Dose:  Not Given


Sodium Chloride (Normal Saline)  1,000 mls @ 100 mls/hr IV ASDIRECTED [facility]


   Last Admin: 02/20/20 02:18 Dose:  100 mls/hr


Ceftriaxone Sodium/Dextrose 1 (gm/ Premix)  50 mls @ 100 mls/hr IV Q24H [facility]


   Last Admin: 02/20/20 13:25 Dose:  100 mls/hr


Azithromycin 250 mg/ Sodium (Chloride)  250 mls @ 250 mls/hr IV Q24H [facility]


   Last Admin: 02/20/20 12:00 Dose:  250 mls/hr


Lactated Ringer's (Ringers, Lactated)  1,000 mls @ 75 mls/hr IV ASDIRECTED [facility]


   Last Admin: 02/21/20 06:05 Dose:  75 mls/hr


Sodium Chloride (Normal Saline)  1,000 mls @ 75 mls/hr IV ASDIRECTED [facility]


   Last Admin: 02/21/20 10:54 Dose:  75 mls/hr


Sodium Chloride (Normal Saline)  1,000 mls @ 999 mls/hr IV STAT ONE


   Stop: 02/21/20 10:14


   Last Admin: 02/21/20 10:14 Dose:  999 mls/hr


Meropenem 1 gm/ Sodium (Chloride)  50 mls @ 100 mls/hr IV Q12H [facility]


   Last Admin: 02/21/20 10:52 Dose:  Not Given


Levofloxacin/Dextrose 750 mg/ (Premix)  150 mls @ 100 mls/hr IV Q48H [facility]


   Last Admin: 02/21/20 11:44 Dose:  100 mls/hr


Meropenem/Sodium Chloride 1 gm (/ Premix)  50 mls @ 100 mls/hr IV Q12H [facility]


   Last Admin: 02/22/20 10:33 Dose:  100 mls/hr


Vancomycin HCl 0.75 gm/ Sodium (Chloride)  250 mls @ 250 mls/hr IV Q24H [facility]


   Last Admin: 02/22/20 11:56 Dose:  250 mls/hr


Dextrose/Sodium Chloride (Dextrose 5%-1/2 Ns)  1,000 mls @ 75 mls/hr IV 

ASDIRECTED [facility]


   Last Admin: 02/22/20 13:18 Dose:  75 mls/hr


Sodium Chloride (Normal Saline)  500 mls @ 999 mls/hr IV .BOLUS ONE


   Stop: 02/22/20 04:04


   Last Admin: 02/22/20 03:43 Dose:  999 mls/hr


Ibuprofen (Motrin)  800 mg PO Q6H PRN


   PRN Reason: Pain (mild 1-3)


Ketorolac Tromethamine (Toradol)  15 mg IV Q6H PRN


   PRN Reason: Pain (moderate 4-6)


Levothyroxine Sodium (Synthroid)  75 mcg PO ACBREAKFAST [facility]


Levothyroxine Sodium (Levothyroxine)  75 mcg PO ACBREAKFAST [facility]


   Last Admin: 02/22/20 06:34 Dose:  75 mcg


Lidocaine (Xylocaine-Mpf 2%) Confirm Administered Dose 5 ml .ROUTE .STK-MED ONE


   Stop: 02/20/20 09:43


   Last Admin: 02/20/20 10:35 Dose:  Not Given


Lidocaine HCl (Xylocaine 1%) Confirm Administered Dose 20 ml .ROUTE .STK-MED ONE


   Stop: 02/18/20 21:44


   Last Admin: 02/18/20 22:15 Dose:  20 ml


Lorazepam (Ativan)  1 mg IVPUSH Q4H PRN


   PRN Reason: Anxiety


   Last Admin: 02/23/20 13:09 Dose:  1 mg


Lorazepam (Ativan)  1 mg IVPUSH Q1H PRN


   PRN Reason: Agitation


   Last Admin: 02/24/20 06:16 Dose:  1 mg


Megestrol Acetate (Megace)  40 mg PO DAILY [facility]


   Last Admin: 02/22/20 10:33 Dose:  40 mg


Metoprolol Tartrate (Lopressor)  2.5 mg IVPUSH ONETIME ONE


   Stop: 02/22/20 03:13


   Last Admin: 02/22/20 03:23 Dose:  2.5 mg


Midodrine (Midodrine)  10 mg PO TIDAC [facility]


   Last Admin: 02/22/20 10:58 Dose:  10 mg


Morphine Sulfate (Morphine)  2 mg IVPUSH Q30M PRN


   PRN Reason: Pain


   Last Admin: 02/24/20 06:16 Dose:  2 mg


Ondansetron HCl (Zofran Odt)  4 mg PO Q4H PRN


   PRN Reason: nausea, able to take PO


Ondansetron HCl (Zofran)  4 mg IVPUSH Q4H PRN


   PRN Reason: Nausea


   Last Admin: 02/23/20 13:09 Dose:  4 mg


Polyethylene Glycol (Miralax)  17 gm PO DAILY PRN


   PRN Reason: Constipation


Promethazine HCl (Phenergan)  25 mg PO Q8H PRN


   PRN Reason: Nausea/Vomiting


   Last Admin: 02/22/20 11:00 Dose:  25 mg


Promethazine HCl (Phenergan)  12.5 mg IM Q6H PRN


   PRN Reason: Nausea


   Last Admin: 02/21/20 19:57 Dose:  12.5 mg


Rivaroxaban (Xarelto)  10 mg PO DAILY [facility]


   Last Admin: 02/22/20 08:11 Dose:  10 mg


Sodium Chloride (Saline Flush)  10 ml FLUSH ASDIRECTED PRN


   PRN Reason: Keep Vein Open


Sodium Chloride (Saline Flush)  2.5 ml FLUSH ASDIRECTED PRN


   PRN Reason: Keep Vein Open


Sodium Chloride (Ocean Nasal Spray)  5 ml PEDRO Q4H PRN


   PRN Reason: Congestion


   Last Admin: 02/22/20 02:50 Dose:  5 ml


Tramadol HCl (Ultram)  100 mg PO Q8H PRN


   PRN Reason: Pain


   Last Admin: 02/22/20 13:42 Dose:  100 mg


Vancomycin HCl (Pharmacy To Dose - Vancomycin)  1 dose .XX ASDIRECTED [facility]


Vancomycin HCl (Pharmacy To Dose - Vancomycin)  1 dose .XX ASDIRECTED [facility]











- Problem List & Annotations


(1) Acute respiratory failure with hypoxia


SNOMED Code(s): 37645777, 091451442


   Code(s): J96.01 - ACUTE RESPIRATORY FAILURE WITH HYPOXIA   Status: Acute   





(2) Pneumonia


SNOMED Code(s): 675248179


   Code(s): J18.9 - PNEUMONIA, UNSPECIFIED ORGANISM   Status: Acute   


Qualifiers: 


   Pneumonia type: due to unspecified organism   Laterality: unspecified 

laterality   Lung location: unspecified part of lung   Qualified Code(s): J18.9 

- Pneumonia, unspecified organism   





(3) Septic shock


SNOMED Code(s): 24794198


   Code(s): A41.9 - SEPSIS, UNSPECIFIED ORGANISM; R65.21 - SEVERE SEPSIS WITH 

SEPTIC SHOCK   Status: Acute   





- Plan


Plan:: 





I have seen and examined the patient with the resident.  I have discussed the 

findings and treatment plan with the resident.  I agree with the assessment and 

plan as outlined in the following note.

## 2020-02-21 NOTE — PN
[facility]C Physician - Brief Progress [id number]/21/2020 22:04[facility] [place], [place] - JARROD (FARZANA) - JARROD ZAMBRANOFLOWER RODRIGUEZBenDate of Service 02/21/2020 22:04HPI/Events of
 Note Camera assessment for heart rate jumping to 1 45-1 63.  Discussed with the bedside RN over the 
phone.  Blood pressure is stable.  No complaints.  Urine output positive.  Sinus tachycardia.Labs rev
iewed.  Sodium 147.  Creatinine normal.Assessment and plan1.  Severe sepsis off of Levophed from pneu
monia and UTI.  On antibiotic.  Sinus tachycardia.  Stat a blood sugar checked showing 20.  Hypoglyce
maryanne.Hyponatremia.-Start dextrose 50% push.  Follow blood sugar.-DC normal saline and change changed t
o D5 half-normal at 75 mils per hour-Get BMP and magnesium and EKG stat.-Aspiration precautionsInterv
entions Major-Arrhythmia - evaluation and management, Other: HypoglycemiaElectronically Signed by: TAMIA CAMPUZANO) on 02/21/2020 22:07

## 2020-02-21 NOTE — PN
THC Physician - Brief Progress FkidDRUVYARLD97/21/2020 12:46[facility]
[place], [place] - JARROD (FARZANA) - FLOWER GUDate of Service 02/21/2020 12:46HPI/Events of
 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
 shock, now with worsening chest xray.Would continue current antibiotics, but concern for fluid overl
oad vs aspirationPatient does have NG tube, but posterior oropharynx secretions and nasal secretions 
could track down in to lungs.Would have patient do formal swallow with crest syndrome, esophageal dys
motility is a possiblity.Patient may also have a component of chf, and may require diuresis.Consider 
BNP and Procal.Pulmonary toileting with flutter/incentive spirometry may be helpful.Patient appears t
o be weakened, would readdress code status with family as well.Patient continues to have a metabolic 
acidosisWith hyperchloremia and now hypernatremiaConsider starting tube feeds with free water.Please 
call if neededWBC count remains elevated, would monitor closely.Interventions Minor-Communication wit
h other healthcare providers and/or family, Routine modifications to care plan (e.g. PRN medications 
for pain, fever)Electronically Signed by: TRAVIS BENITO () on 02/21/2020 13:26

## 2020-02-21 NOTE — PN
THC Physician - Brief Progress [id number]/20/2020 11:41[facility]
[place], [place] - [facility] ([facility]) [facility] RODRIGUEZBenDate of Service 02/20/2020 11:41HPI/Events of
 Note eICU Progress NotePatient is a 84-year-old female being managed for septic shock secondary to U
TI and Pneumonia, pressors have been weaned since 7:45 AM per nurseOn camera, patient is asleep in be
d, appears to be comfortable and in no distressVitals, on the monitor blood pressure is 101/51, heart
 rate is 88 appears to be normal sinus rhythm, saturating 97% on nasal cannulaReviewedEMR notesLabs, 
improved leukocytosis, worsening hyperchloremic metabolic acidosisMicro, urine culture with E. coli s
usceptible to cephalosporinsImagingxreviewedMedications reviewedeICU impressionsStatus post resolutio
n of septic shock secondary to suspected UTI and CAPUTI, urine culture with E. coli susceptible to ce
phalosporinsPneumonia, risk factor for OrganismsHyperchloremic metabolic acidosisVTE and GI prophylax
isGlycemic controleICU recommendationsDe-escalate antibiotics to ceftriaxone and azithromycinDisconti
nue stress dose steroids given rapid resolution of shock and pressor requirementAvoid normal saline g
iven hyperchloremiaIf bicarb continues to decline, will consider initiation of a bicarb dripVTE proph
ylaxis noted, currently on systemic anticoagulation with XareltoGI prophylaxis noted, currently on IV
 Protonix, will change to p.o. Pepcid twice dailyGlycemic control per protocol, blood sugar target be
tween 140-180Discussed care with bedside RN and bedside clinicianThank you for allowing us to partici
french in the care of your patient. Critical care; 35 minutes total evaluation time Interventions Major
-Acid-Base disturbance - evaluation and management, Infection - evaluation and management, Shock - ev
aluation and managementElectronically Signed by: POOJA DALLAS) on 02/20/2020 11:44

## 2020-02-22 VITALS — DIASTOLIC BLOOD PRESSURE: 79 MMHG | SYSTOLIC BLOOD PRESSURE: 139 MMHG | HEART RATE: 105 BPM

## 2020-02-22 LAB
BUN SERPL-MCNC: 30 MG/DL (ref 7–18)
CHLORIDE SERPL-SCNC: 115 MMOL/L (ref 98–107)
CO2 SERPL-SCNC: 14.7 MMOL/L (ref 21–32)
GLUCOSE SERPL-MCNC: 154 MG/DL (ref 74–106)
POTASSIUM SERPL-SCNC: 3.9 MMOL/L (ref 3.5–5.1)
SODIUM SERPL-SCNC: 146 MMOL/L (ref 136–145)

## 2020-02-22 RX ADMIN — LORAZEPAM PRN MG: 2 INJECTION INTRAMUSCULAR; INTRAVENOUS at 20:55

## 2020-02-22 RX ADMIN — MEROPENEM AND SODIUM CHLORIDE SCH MLS/HR: 1 INJECTION, SOLUTION INTRAVENOUS at 10:33

## 2020-02-22 RX ADMIN — HYDROCORTISONE SODIUM SUCCINATE SCH MG: 100 INJECTION, POWDER, FOR SOLUTION INTRAMUSCULAR; INTRAVENOUS at 08:11

## 2020-02-22 NOTE — PCM.PN
<Luis Aguilar - Last Filed: 02/22/20 16:38>





- General Info


Date of Service: 02/22/20


Subjective Update: 





No acute events overnight. This morning I had a conversation with the patient 

and her son and daughter at bedside in regards to her goals of care. We 

discussed patient's current medical situation and her declining health over 

time.  She had decided to proceed with palliative measures only. Will 

discontinue telemetry, IV lines, arterial line, central line. No more labs. 

Will dc antibiotics. Will focus on pain and comfort. 





- Patient Data


Vitals - Most Recent: 


 Last Vital Signs











Temp  36.4 C   02/22/20 14:00


 


Pulse  105 H  02/22/20 14:00


 


Resp  40 H  02/22/20 14:00


 


BP  139/79   02/22/20 14:00


 


Pulse Ox  92 L  02/22/20 14:00











Weight - Most Recent: 48.852 kg


I&O - Last 24 Hours: 


 Intake & Output











 02/22/20 02/22/20 02/22/20





 06:59 14:59 22:59


 


Intake Total 1317 470 


 


Output Total 200  


 


Balance 1117 470 











Lab Results Last 24 Hours: 


 Laboratory Results - last 24 hr











  02/21/20 02/21/20 02/21/20 Range/Units





  22:04 22:10 22:17 


 


WBC     (4.0-11.0)  K/uL


 


RBC     (4.30-5.90)  M/uL


 


Hgb     (12.0-16.0)  g/dL


 


Hct     (36.0-46.0)  %


 


MCV     (80.0-98.0)  fL


 


MCH     (27.0-32.0)  pg


 


MCHC     (31.0-37.0)  g/dL


 


RDW Std Deviation     (28.0-62.0)  fl


 


RDW Coeff of Rai     (11.0-15.0)  %


 


Plt Count     (150-400)  K/uL


 


MPV     (7.40-12.00)  fL


 


Add Manual Diff     


 


Neutrophils % (Manual)     (48.0-80.0)  %


 


Band Neutrophils %     %


 


Lymphocytes % (Manual)     (16.0-40.0)  %


 


Monocytes % (Manual)     (0.0-15.0)  %


 


Myelocytes %     %


 


Nucleated RBC %     /100WBC


 


Absolute Seg Neuts     (1.4-5.7)  


 


Band Neutrophils #     


 


Lymphocytes # (Manual)     (0.6-2.4)  


 


Monocytes # (Manual)     (0.0-0.8)  


 


Absolute Myelocytes     


 


Nucleated RBCs #     K/uL


 


Sodium   143   (136-145)  mmol/L


 


Potassium   4.0   (3.5-5.1)  mmol/L


 


Chloride   112 H   ()  mmol/L


 


Carbon Dioxide   14.3 L   (21.0-32.0)  mmol/L


 


BUN   35 H   (7.0-18.0)  mg/dL


 


Creatinine   1.0   (0.6-1.0)  mg/dL


 


Est Cr Clr Drug Dosing   32.16   mL/min


 


Estimated GFR (MDRD)   53.0   ml/min


 


Glucose   107 H   ()  mg/dL


 


POC Glucose  20 L   121 H  ()  mg/dL


 


Calcium   8.9   (8.5-10.1)  mg/dL


 


Magnesium   2.3   (1.8-2.4)  mg/dL


 


Total Bilirubin     (0.2-1.0)  mg/dL


 


AST     (15-37)  IU/L


 


ALT     (14-63)  IU/L


 


Alkaline Phosphatase     ()  U/L


 


Total Protein     (6.4-8.2)  g/dL


 


Albumin     (3.4-5.0)  g/dL


 


Globulin     (2.6-4.0)  g/dL


 


Albumin/Globulin Ratio     (0.9-1.6)  














  02/22/20 02/22/20 02/22/20 Range/Units





  03:01 06:10 06:10 


 


WBC   19.68 H   (4.0-11.0)  K/uL


 


RBC   3.23 L   (4.30-5.90)  M/uL


 


Hgb   9.5 L   (12.0-16.0)  g/dL


 


Hct   29.2 L   (36.0-46.0)  %


 


MCV   90.4   (80.0-98.0)  fL


 


MCH   29.4   (27.0-32.0)  pg


 


MCHC   32.5   (31.0-37.0)  g/dL


 


RDW Std Deviation   49.9   (28.0-62.0)  fl


 


RDW Coeff of Rai   15   (11.0-15.0)  %


 


Plt Count   261   (150-400)  K/uL


 


MPV   9.70   (7.40-12.00)  fL


 


Add Manual Diff   YES   


 


Neutrophils % (Manual)   94 H   (48.0-80.0)  %


 


Band Neutrophils %   1   %


 


Lymphocytes % (Manual)   3 L   (16.0-40.0)  %


 


Monocytes % (Manual)   1   (0.0-15.0)  %


 


Myelocytes %   1   %


 


Nucleated RBC %   0.3   /100WBC


 


Absolute Seg Neuts   18.5 H   (1.4-5.7)  


 


Band Neutrophils #   0.2   


 


Lymphocytes # (Manual)   0.6   (0.6-2.4)  


 


Monocytes # (Manual)   0.2   (0.0-0.8)  


 


Absolute Myelocytes   0.2   


 


Nucleated RBCs #   0   K/uL


 


Sodium    146 H  (136-145)  mmol/L


 


Potassium    3.9  (3.5-5.1)  mmol/L


 


Chloride    115 H  ()  mmol/L


 


Carbon Dioxide    14.7 L  (21.0-32.0)  mmol/L


 


BUN    30 H  (7.0-18.0)  mg/dL


 


Creatinine    0.9  (0.6-1.0)  mg/dL


 


Est Cr Clr Drug Dosing    35.74  mL/min


 


Estimated GFR (MDRD)    59.8  ml/min


 


Glucose    154 H  ()  mg/dL


 


POC Glucose  147 H    ()  mg/dL


 


Calcium    8.3 L  (8.5-10.1)  mg/dL


 


Magnesium     (1.8-2.4)  mg/dL


 


Total Bilirubin    0.2  (0.2-1.0)  mg/dL


 


AST    18  (15-37)  IU/L


 


ALT    19  (14-63)  IU/L


 


Alkaline Phosphatase    95  ()  U/L


 


Total Protein    4.7 L  (6.4-8.2)  g/dL


 


Albumin    1.7 L  (3.4-5.0)  g/dL


 


Globulin    3.0  (2.6-4.0)  g/dL


 


Albumin/Globulin Ratio    0.6 L  (0.9-1.6)  











Jose J Results Last 24 Hours: 


 Microbiology











 02/18/20 13:33 Aerobic Blood Culture - Preliminary





 Blood - Venous - Lab Draw    NO GROWTH AFTER 3 DAYS





 Anaerobic Blood Culture - Final


 


 02/18/20 13:00 Aerobic Blood Culture - Preliminary





 Blood - Venous    NO GROWTH AFTER 3 DAYS





 Anaerobic Blood Culture - Preliminary





    NO GROWTH AFTER 3 DAYS











Med Orders - Current: 


 Current Medications





Acetaminophen (Tylenol)  650 mg PO Q4H PRN


   PRN Reason: Pain (Mild 1-3)/fever


   Last Admin: 02/19/20 14:20 Dose:  650 mg


Atropine Sulfate (Atropine 1% Ophth Soln)  0 ml SL Q1H PRN


   PRN Reason: Other


Docusate Sodium (Colace)  100 mg PO BID PRN


   PRN Reason: Constipation


Famotidine (Pepcid)  20 mg PO BID [facility]


   Last Admin: 02/22/20 08:11 Dose:  20 mg


Morphine Sulfate (Morphine)  2 mg IVPUSH Q30M PRN


   PRN Reason: Pain


   Last Admin: 02/22/20 16:01 Dose:  2 mg


Ondansetron HCl (Zofran Odt)  4 mg PO Q4H PRN


   PRN Reason: nausea, able to take PO


Ondansetron HCl (Zofran)  4 mg IVPUSH Q4H PRN


   PRN Reason: Nausea


   Last Admin: 02/20/20 12:22 Dose:  4 mg


Polyethylene Glycol (Miralax)  17 gm PO DAILY PRN


   PRN Reason: Constipation


Promethazine HCl (Phenergan)  25 mg PO Q8H PRN


   PRN Reason: Nausea/Vomiting


   Last Admin: 02/22/20 11:00 Dose:  25 mg


Promethazine HCl (Phenergan)  12.5 mg IM Q6H PRN


   PRN Reason: Nausea


   Last Admin: 02/21/20 19:57 Dose:  12.5 mg


Sodium Chloride (Saline Flush)  10 ml FLUSH ASDIRECTED PRN


   PRN Reason: Keep Vein Open


Sodium Chloride (Saline Flush)  2.5 ml FLUSH ASDIRECTED PRN


   PRN Reason: Keep Vein Open


Sodium Chloride (Ocean Nasal Spray)  5 ml PEDRO Q4H PRN


   PRN Reason: Congestion


   Last Admin: 02/22/20 02:50 Dose:  5 ml





Discontinued Medications





Hydrocodone Bitart/Acetaminophen (Norco 325-5 Mg)  1 tab PO Q6H PRN


   PRN Reason: Pain


   Last Admin: 02/21/20 06:10 Dose:  1 tab


Albuterol/Ipratropium (Duoneb 3.0-0.5 Mg/3 Ml)  3 ml NEB ONETIME ONE


   Stop: 02/21/20 11:52


   Last Admin: 02/21/20 12:20 Dose:  3 ml


Albuterol/Ipratropium (Duoneb 3.0-0.5 Mg/3 Ml)  3 ml NEB Q4HRRT PRN


   PRN Reason: Shortness of Breath


   Last Admin: 02/22/20 14:20 Dose:  3 ml


Aspirin (Aspirin)  325 mg PO ONETIME ONE


   Stop: 02/18/20 17:23


   Last Admin: 02/18/20 17:45 Dose:  325 mg


Dextrose/Water (Dextrose 50% In Water)  50 ml IVPUSH ONETIME STA


   Stop: 02/21/20 22:03


   Last Admin: 02/21/20 22:08 Dose:  50 ml


Enoxaparin Sodium (Lovenox)  40 mg SUBCUT Q24H [facility]


   Last Admin: 02/18/20 21:02 Dose:  Not Given


Fentanyl (Sublimaze)  25 mcg IVPUSH Q4HR PRN


   PRN Reason: Severe pain


   Last Admin: 02/19/20 03:30 Dose:  25 mcg


Fentanyl (Fentanyl)  25 mcg IVPUSH ONETIME ONE


   Stop: 02/19/20 01:40


   Last Admin: 02/19/20 02:01 Dose:  Not Given


Fentanyl (Sublimaze)  25 mcg IVPUSH ONETIME ONE


   Stop: 02/19/20 02:01


   Last Admin: 02/19/20 02:01 Dose:  25 mcg


Hydrocortisone Sodium Succinate (Solu-Cortef)  100 mg IVPUSH Q8H [facility]


   Last Admin: 02/20/20 11:45 Dose:  Not Given


Hydrocortisone Sodium Succinate (Solu-Cortef)  100 mg IVPUSH ONETIME ONE


   Stop: 02/19/20 03:31


   Last Admin: 02/19/20 03:23 Dose:  100 mg


Hydrocortisone Sodium Succinate (Solu-Cortef)  100 mg IVPUSH Q12H [facility]


   Last Admin: 02/22/20 08:11 Dose:  100 mg


Sodium Chloride (Normal Saline)  1,000 mls @ 999 mls/hr IV .Bolus ONE


   Stop: 02/18/20 14:18


   Last Admin: 02/18/20 14:06 Dose:  999 mls/hr


Sodium Chloride (Normal Saline)  1,000 mls @ 999 mls/hr IV .Bolus ONE


   Stop: 02/18/20 14:30


   Last Admin: 02/18/20 14:06 Dose:  999 mls/hr


Norepinephrine Bitartrate (Norepinephr-0.9% Nacl 4 Mg/250)  4 mg in 250 mls @ 

7.5 mls/hr IV TITRATE [facility]; Protocol


   Last Titration: 02/20/20 07:45 Dose:  0 mcg/min, 0 mls/hr


Meropenem 1 gm/ Sodium (Chloride)  100 mls @ 200 mls/hr IV ONETIME ONE


   Stop: 02/18/20 15:39


   Last Admin: 02/18/20 17:30 Dose:  Not Given


Sodium Chloride (Normal Saline)  1,000 mls @ 999 mls/hr IV .Bolus ONE


   Stop: 02/18/20 17:45


   Last Admin: 02/18/20 17:07 Dose:  999 mls/hr


Levofloxacin/Dextrose 750 mg/ (Premix)  150 mls @ 100 mls/hr IV ONETIME ONE


   Stop: 02/18/20 18:36


   Last Admin: 02/18/20 17:46 Dose:  100 mls/hr


Piperacillin Sod/Tazobactam (Sod 3.375 gm/ Sodium Chloride)  50 mls @ 100 mls/

hr IV Q8H [facility]


Sodium Chloride (Normal Saline)  1,000 mls @ 125 mls/hr IV CONTINUOUS [facility]


   Last Infusion: 02/19/20 09:15 Dose:  100 mls/hr


Pantoprazole Sodium 40 mg/ (Sodium Chloride)  10 mls @ 300 mls/hr IV DAILY [facility]


   Last Admin: 02/20/20 08:56 Dose:  300 mls/hr


Sodium Chloride (Normal Saline)  1,000 mls @ 999 mls/hr IV .Bolus ONE


   Stop: 02/18/20 20:24


   Last Admin: 02/18/20 19:27 Dose:  999 mls/hr


Ertapenem 1 gm/ Sodium (Chloride)  50 mls @ 100 mls/hr IV Q24H [facility]


   Last Admin: 02/19/20 19:47 Dose:  100 mls/hr


Vancomycin HCl 1 gm/ Sodium (Chloride)  250 mls @ 166 mls/hr IV Q24H [facility]


   Last Admin: 02/19/20 20:49 Dose:  166 mls/hr


Vasopressin 100 units/ Sodium (Chloride)  100 mls @ 1.8 mls/hr IV TITRATE [facility]


Vasopressin 100 units/ Sodium (Chloride)  100 mls @ 1.8 mls/hr IV CONTINUOUS [facility]


   Last Admin: 02/18/20 22:40 Dose:  0.03 units/min, 1.8 mls/hr


Magnesium Sulfate 4 gm/ Premix  100 mls @ 50 mls/hr IV ONETIME ONE


   Stop: 02/19/20 08:37


   Last Admin: 02/19/20 06:48 Dose:  50 mls/hr


Levofloxacin/Dextrose 750 mg/ (Premix)  150 mls @ 100 mls/hr IV Q48H [facility]


Sodium Chloride (Normal Saline)  1,000 mls @ 100 mls/hr IV Q10H [facility]


   Last Admin: 02/19/20 20:56 Dose:  Not Given


Sodium Chloride (Normal Saline)  1,000 mls @ 100 mls/hr IV ASDIRECTED [facility]


   Last Admin: 02/20/20 02:18 Dose:  100 mls/hr


Ceftriaxone Sodium/Dextrose 1 (gm/ Premix)  50 mls @ 100 mls/hr IV Q24H [facility]


   Last Admin: 02/20/20 13:25 Dose:  100 mls/hr


Azithromycin 250 mg/ Sodium (Chloride)  250 mls @ 250 mls/hr IV Q24H [facility]


   Last Admin: 02/20/20 12:00 Dose:  250 mls/hr


Lactated Ringer's (Ringers, Lactated)  1,000 mls @ 75 mls/hr IV ASDIRECTED [facility]


   Last Admin: 02/21/20 06:05 Dose:  75 mls/hr


Sodium Chloride (Normal Saline)  1,000 mls @ 75 mls/hr IV ASDIRECTED [facility]


   Last Admin: 02/21/20 10:54 Dose:  75 mls/hr


Sodium Chloride (Normal Saline)  1,000 mls @ 999 mls/hr IV STAT ONE


   Stop: 02/21/20 10:14


   Last Admin: 02/21/20 10:14 Dose:  999 mls/hr


Meropenem 1 gm/ Sodium (Chloride)  50 mls @ 100 mls/hr IV Q12H [facility]


   Last Admin: 02/21/20 10:52 Dose:  Not Given


Levofloxacin/Dextrose 750 mg/ (Premix)  150 mls @ 100 mls/hr IV Q48H [facility]


   Last Admin: 02/21/20 11:44 Dose:  100 mls/hr


Meropenem/Sodium Chloride 1 gm (/ Premix)  50 mls @ 100 mls/hr IV Q12H [facility]


   Last Admin: 02/22/20 10:33 Dose:  100 mls/hr


Vancomycin HCl 0.75 gm/ Sodium (Chloride)  250 mls @ 250 mls/hr IV Q24H [facility]


   Last Admin: 02/22/20 11:56 Dose:  250 mls/hr


Dextrose/Sodium Chloride (Dextrose 5%-1/2 Ns)  1,000 mls @ 75 mls/hr IV 

ASDIRECTED [facility]


   Last Admin: 02/22/20 13:18 Dose:  75 mls/hr


Sodium Chloride (Normal Saline)  500 mls @ 999 mls/hr IV .BOLUS ONE


   Stop: 02/22/20 04:04


   Last Admin: 02/22/20 03:43 Dose:  999 mls/hr


Ibuprofen (Motrin)  800 mg PO Q6H PRN


   PRN Reason: Pain (mild 1-3)


Ketorolac Tromethamine (Toradol)  15 mg IV Q6H PRN


   PRN Reason: Pain (moderate 4-6)


Levothyroxine Sodium (Synthroid)  75 mcg PO ACBREAKFAST [facility]


Levothyroxine Sodium (Levothyroxine)  75 mcg PO ACBREAKFAST [facility]


   Last Admin: 02/22/20 06:34 Dose:  75 mcg


Lidocaine (Xylocaine-Mpf 2%) Confirm Administered Dose 5 ml .ROUTE .STK-MED ONE


   Stop: 02/20/20 09:43


   Last Admin: 02/20/20 10:35 Dose:  Not Given


Lidocaine HCl (Xylocaine 1%) Confirm Administered Dose 20 ml .ROUTE .STK-MED ONE


   Stop: 02/18/20 21:44


   Last Admin: 02/18/20 22:15 Dose:  20 ml


Megestrol Acetate (Megace)  40 mg PO DAILY [facility]


   Last Admin: 02/22/20 10:33 Dose:  40 mg


Metoprolol Tartrate (Lopressor)  2.5 mg IVPUSH ONETIME ONE


   Stop: 02/22/20 03:13


   Last Admin: 02/22/20 03:23 Dose:  2.5 mg


Midodrine (Midodrine)  10 mg PO TIDAC [facility]


   Last Admin: 02/22/20 10:58 Dose:  10 mg


Rivaroxaban (Xarelto)  10 mg PO DAILY [facility]


   Last Admin: 02/22/20 08:11 Dose:  10 mg


Tramadol HCl (Ultram)  100 mg PO Q8H PRN


   PRN Reason: Pain


   Last Admin: 02/22/20 13:42 Dose:  100 mg


Vancomycin HCl (Pharmacy To Dose - Vancomycin)  1 dose .XX ASDIRECTED [facility]


Vancomycin HCl (Pharmacy To Dose - Vancomycin)  1 dose .XX ASDIRECTED [facility]











- Exam


General: Alert, Oriented, Cooperative, No Acute Distress


Lungs: Decreased Breath Sounds, Crackles, Rhonchi


Cardiovascular: Irregular Rhythm, Tachycardia


GI/Abdominal Exam: Normal Bowel Sounds, Soft, Non-Tender, No Distention


Extremities: No Pedal Edema


Skin: Warm, Dry





Sepsis Event Note





- Evaluation


Sepsis Screening Result: Severe Sepsis Risk





- Focused Exam


Vital Signs: 


 Vital Signs











  Temp Pulse Resp BP BP Pulse Ox Pulse Ox


 


 02/22/20 14:00  36.4 C  105 H  40 H  117/75  139/79  92 L 


 


 02/22/20 13:00  36.4 C  103 H  32 H  138/77  157/84 H  93 L 


 


 02/22/20 12:00  36.4 C  99  30 H  127/67  143/75 H  92 L 


 


 02/22/20 11:00  36.4 C  101 H  26 H  123/74  136/72  94 L 


 


 02/22/20 10:11  36.3 C  105 H  16  108/83  147/83 H  92 L 


 


 02/22/20 09:00  36.6 C  108 H  20  108/64   94 L 


 


 02/22/20 08:10  36.6 C  110 H  23 H  124/73  140/74  93 L 


 


 02/22/20 06:58    25 H  103/57 L  100/57 L  95 


 


 02/22/20 06:00    26 H  93/53 L  102/55 L  94 L  94 L


 


 02/22/20 05:33     95/50 L  90/47 L  


 


 02/22/20 05:10    26 H  82/58 L  96/54 L  95 


 


 02/22/20 04:43    36 H    94 L 











Date Exam was Performed: 02/22/20


Time Exam was Performed: 16:38





- Problem List Review


Problem List Initiated/Reviewed/Updated: Yes





- My Orders


Last 24 Hours: 


My Active Orders





02/22/20 12:35


Transfer Patient (Change bed) [ADT] Routine 





02/22/20 14:50


Code Status [Resuscitation Status] Routine 





02/22/20 14:56


Morphine   2 mg IVPUSH Q30M PRN 





02/22/20 14:57


Atropine 1% [Atropine 1% Ophth Soln]   0 ml SL Q1H PRN 





02/24/20 10:46


Consult to Physical Therapy [PT Evaluation and Treatment] [CONS] Routine 














- Plan


Plan:: 





A/P





Will proceed with palliative measures since we had a long discussion with 

patient and family regarding her health and prognosis. She would like to focus 

on pain control and comfort. Will discontinue telemetry, IV lines, arterial line

, central line. No labs. No antibiotics.











<Amparo Mayo - Last Filed: 03/03/20 11:44>





- Patient Data


Vitals - Most Recent: 


 Last Vital Signs











Temp  36.4 C   02/22/20 14:00


 


Pulse  105 H  02/22/20 14:00


 


Resp  40 H  02/22/20 14:00


 


BP  139/79   02/22/20 14:00


 


Pulse Ox  92 L  02/22/20 14:00











Med Orders - Current: 


 Current Medications








Discontinued Medications





Acetaminophen (Tylenol)  650 mg PO Q4H PRN


   PRN Reason: Pain (Mild 1-3)/fever


   Last Admin: 02/19/20 14:20 Dose:  650 mg


Hydrocodone Bitart/Acetaminophen (Norco 325-5 Mg)  1 tab PO Q6H PRN


   PRN Reason: Pain


   Last Admin: 02/21/20 06:10 Dose:  1 tab


Albuterol/Ipratropium (Duoneb 3.0-0.5 Mg/3 Ml)  3 ml NEB ONETIME ONE


   Stop: 02/21/20 11:52


   Last Admin: 02/21/20 12:20 Dose:  3 ml


Albuterol/Ipratropium (Duoneb 3.0-0.5 Mg/3 Ml)  3 ml NEB Q4HRRT PRN


   PRN Reason: Shortness of Breath


   Last Admin: 02/22/20 14:20 Dose:  3 ml


Aspirin (Aspirin)  325 mg PO ONETIME ONE


   Stop: 02/18/20 17:23


   Last Admin: 02/18/20 17:45 Dose:  325 mg


Atropine Sulfate (Atropine 1% Ophth Soln)  0 ml SL Q1H PRN


   PRN Reason: Other


Dextrose/Water (Dextrose 50% In Water)  50 ml IVPUSH ONETIME STA


   Stop: 02/21/20 22:03


   Last Admin: 02/21/20 22:08 Dose:  50 ml


Docusate Sodium (Colace)  100 mg PO BID PRN


   PRN Reason: Constipation


Enoxaparin Sodium (Lovenox)  40 mg SUBCUT Q24H [facility]


   Last Admin: 02/18/20 21:02 Dose:  Not Given


Famotidine (Pepcid)  20 mg PO BID [facility]


   Last Admin: 02/24/20 11:05 Dose:  Not Given


Fentanyl (Sublimaze)  25 mcg IVPUSH Q4HR PRN


   PRN Reason: Severe pain


   Last Admin: 02/19/20 03:30 Dose:  25 mcg


Fentanyl (Fentanyl)  25 mcg IVPUSH ONETIME ONE


   Stop: 02/19/20 01:40


   Last Admin: 02/19/20 02:01 Dose:  Not Given


Fentanyl (Sublimaze)  25 mcg IVPUSH ONETIME ONE


   Stop: 02/19/20 02:01


   Last Admin: 02/19/20 02:01 Dose:  25 mcg


Hydrocortisone Sodium Succinate (Solu-Cortef)  100 mg IVPUSH Q8H [facility]


   Last Admin: 02/20/20 11:45 Dose:  Not Given


Hydrocortisone Sodium Succinate (Solu-Cortef)  100 mg IVPUSH ONETIME ONE


   Stop: 02/19/20 03:31


   Last Admin: 02/19/20 03:23 Dose:  100 mg


Hydrocortisone Sodium Succinate (Solu-Cortef)  100 mg IVPUSH Q12H [facility]


   Last Admin: 02/22/20 08:11 Dose:  100 mg


Sodium Chloride (Normal Saline)  1,000 mls @ 999 mls/hr IV .Bolus ONE


   Stop: 02/18/20 14:18


   Last Admin: 02/18/20 14:06 Dose:  999 mls/hr


Sodium Chloride (Normal Saline)  1,000 mls @ 999 mls/hr IV .Bolus ONE


   Stop: 02/18/20 14:30


   Last Admin: 02/18/20 14:06 Dose:  999 mls/hr


Norepinephrine Bitartrate (Norepinephr-0.9% Nacl 4 Mg/250)  4 mg in 250 mls @ 

7.5 mls/hr IV TITRATE [facility]; Protocol


   Last Titration: 02/20/20 07:45 Dose:  0 mcg/min, 0 mls/hr


Meropenem 1 gm/ Sodium (Chloride)  100 mls @ 200 mls/hr IV ONETIME ONE


   Stop: 02/18/20 15:39


   Last Admin: 02/18/20 17:30 Dose:  Not Given


Sodium Chloride (Normal Saline)  1,000 mls @ 999 mls/hr IV .Bolus ONE


   Stop: 02/18/20 17:45


   Last Admin: 02/18/20 17:07 Dose:  999 mls/hr


Levofloxacin/Dextrose 750 mg/ (Premix)  150 mls @ 100 mls/hr IV ONETIME ONE


   Stop: 02/18/20 18:36


   Last Admin: 02/18/20 17:46 Dose:  100 mls/hr


Piperacillin Sod/Tazobactam (Sod 3.375 gm/ Sodium Chloride)  50 mls @ 100 mls/

hr IV Q8H [facility]


Sodium Chloride (Normal Saline)  1,000 mls @ 125 mls/hr IV CONTINUOUS [facility]


   Last Infusion: 02/19/20 09:15 Dose:  100 mls/hr


Pantoprazole Sodium 40 mg/ (Sodium Chloride)  10 mls @ 300 mls/hr IV DAILY [facility]


   Last Admin: 02/20/20 08:56 Dose:  300 mls/hr


Sodium Chloride (Normal Saline)  1,000 mls @ 999 mls/hr IV .Bolus ONE


   Stop: 02/18/20 20:24


   Last Admin: 02/18/20 19:27 Dose:  999 mls/hr


Ertapenem 1 gm/ Sodium (Chloride)  50 mls @ 100 mls/hr IV Q24H [facility]


   Last Admin: 02/19/20 19:47 Dose:  100 mls/hr


Vancomycin HCl 1 gm/ Sodium (Chloride)  250 mls @ 166 mls/hr IV Q24H [facility]


   Last Admin: 02/19/20 20:49 Dose:  166 mls/hr


Vasopressin 100 units/ Sodium (Chloride)  100 mls @ 1.8 mls/hr IV TITRATE [facility]


Vasopressin 100 units/ Sodium (Chloride)  100 mls @ 1.8 mls/hr IV CONTINUOUS [facility]


   Last Admin: 02/18/20 22:40 Dose:  0.03 units/min, 1.8 mls/hr


Magnesium Sulfate 4 gm/ Premix  100 mls @ 50 mls/hr IV ONETIME ONE


   Stop: 02/19/20 08:37


   Last Admin: 02/19/20 06:48 Dose:  50 mls/hr


Levofloxacin/Dextrose 750 mg/ (Premix)  150 mls @ 100 mls/hr IV Q48H [facility]


Sodium Chloride (Normal Saline)  1,000 mls @ 100 mls/hr IV Q10H [facility]


   Last Admin: 02/19/20 20:56 Dose:  Not Given


Sodium Chloride (Normal Saline)  1,000 mls @ 100 mls/hr IV ASDIRECTED [facility]


   Last Admin: 02/20/20 02:18 Dose:  100 mls/hr


Ceftriaxone Sodium/Dextrose 1 (gm/ Premix)  50 mls @ 100 mls/hr IV Q24H [facility]


   Last Admin: 02/20/20 13:25 Dose:  100 mls/hr


Azithromycin 250 mg/ Sodium (Chloride)  250 mls @ 250 mls/hr IV Q24H [facility]


   Last Admin: 02/20/20 12:00 Dose:  250 mls/hr


Lactated Ringer's (Ringers, Lactated)  1,000 mls @ 75 mls/hr IV ASDIRECTED [facility]


   Last Admin: 02/21/20 06:05 Dose:  75 mls/hr


Sodium Chloride (Normal Saline)  1,000 mls @ 75 mls/hr IV ASDIRECTED [facility]


   Last Admin: 02/21/20 10:54 Dose:  75 mls/hr


Sodium Chloride (Normal Saline)  1,000 mls @ 999 mls/hr IV STAT ONE


   Stop: 02/21/20 10:14


   Last Admin: 02/21/20 10:14 Dose:  999 mls/hr


Meropenem 1 gm/ Sodium (Chloride)  50 mls @ 100 mls/hr IV Q12H [facility]


   Last Admin: 02/21/20 10:52 Dose:  Not Given


Levofloxacin/Dextrose 750 mg/ (Premix)  150 mls @ 100 mls/hr IV Q48H [facility]


   Last Admin: 02/21/20 11:44 Dose:  100 mls/hr


Meropenem/Sodium Chloride 1 gm (/ Premix)  50 mls @ 100 mls/hr IV Q12H [facility]


   Last Admin: 02/22/20 10:33 Dose:  100 mls/hr


Vancomycin HCl 0.75 gm/ Sodium (Chloride)  250 mls @ 250 mls/hr IV Q24H [facility]


   Last Admin: 02/22/20 11:56 Dose:  250 mls/hr


Dextrose/Sodium Chloride (Dextrose 5%-1/2 Ns)  1,000 mls @ 75 mls/hr IV 

ASDIRECTED [facility]


   Last Admin: 02/22/20 13:18 Dose:  75 mls/hr


Sodium Chloride (Normal Saline)  500 mls @ 999 mls/hr IV .BOLUS ONE


   Stop: 02/22/20 04:04


   Last Admin: 02/22/20 03:43 Dose:  999 mls/hr


Ibuprofen (Motrin)  800 mg PO Q6H PRN


   PRN Reason: Pain (mild 1-3)


Ketorolac Tromethamine (Toradol)  15 mg IV Q6H PRN


   PRN Reason: Pain (moderate 4-6)


Levothyroxine Sodium (Synthroid)  75 mcg PO ACBREAKFAST [facility]


Levothyroxine Sodium (Levothyroxine)  75 mcg PO ACBREAKFAST [facility]


   Last Admin: 02/22/20 06:34 Dose:  75 mcg


Lidocaine (Xylocaine-Mpf 2%) Confirm Administered Dose 5 ml .ROUTE .STK-MED ONE


   Stop: 02/20/20 09:43


   Last Admin: 02/20/20 10:35 Dose:  Not Given


Lidocaine HCl (Xylocaine 1%) Confirm Administered Dose 20 ml .ROUTE .STK-MED ONE


   Stop: 02/18/20 21:44


   Last Admin: 02/18/20 22:15 Dose:  20 ml


Lorazepam (Ativan)  1 mg IVPUSH Q4H PRN


   PRN Reason: Anxiety


   Last Admin: 02/23/20 13:09 Dose:  1 mg


Lorazepam (Ativan)  1 mg IVPUSH Q1H PRN


   PRN Reason: Agitation


   Last Admin: 02/24/20 06:16 Dose:  1 mg


Megestrol Acetate (Megace)  40 mg PO DAILY [facility]


   Last Admin: 02/22/20 10:33 Dose:  40 mg


Metoprolol Tartrate (Lopressor)  2.5 mg IVPUSH ONETIME ONE


   Stop: 02/22/20 03:13


   Last Admin: 02/22/20 03:23 Dose:  2.5 mg


Midodrine (Midodrine)  10 mg PO TIDAC [facility]


   Last Admin: 02/22/20 10:58 Dose:  10 mg


Morphine Sulfate (Morphine)  2 mg IVPUSH Q30M PRN


   PRN Reason: Pain


   Last Admin: 02/24/20 06:16 Dose:  2 mg


Ondansetron HCl (Zofran Odt)  4 mg PO Q4H PRN


   PRN Reason: nausea, able to take PO


Ondansetron HCl (Zofran)  4 mg IVPUSH Q4H PRN


   PRN Reason: Nausea


   Last Admin: 02/23/20 13:09 Dose:  4 mg


Polyethylene Glycol (Miralax)  17 gm PO DAILY PRN


   PRN Reason: Constipation


Promethazine HCl (Phenergan)  25 mg PO Q8H PRN


   PRN Reason: Nausea/Vomiting


   Last Admin: 02/22/20 11:00 Dose:  25 mg


Promethazine HCl (Phenergan)  12.5 mg IM Q6H PRN


   PRN Reason: Nausea


   Last Admin: 02/21/20 19:57 Dose:  12.5 mg


Rivaroxaban (Xarelto)  10 mg PO DAILY [facility]


   Last Admin: 02/22/20 08:11 Dose:  10 mg


Sodium Chloride (Saline Flush)  10 ml FLUSH ASDIRECTED PRN


   PRN Reason: Keep Vein Open


Sodium Chloride (Saline Flush)  2.5 ml FLUSH ASDIRECTED PRN


   PRN Reason: Keep Vein Open


Sodium Chloride (Ocean Nasal Spray)  5 ml PEDRO Q4H PRN


   PRN Reason: Congestion


   Last Admin: 02/22/20 02:50 Dose:  5 ml


Tramadol HCl (Ultram)  100 mg PO Q8H PRN


   PRN Reason: Pain


   Last Admin: 02/22/20 13:42 Dose:  100 mg


Vancomycin HCl (Pharmacy To Dose - Vancomycin)  1 dose .XX ASDIRECTED [facility]


Vancomycin HCl (Pharmacy To Dose - Vancomycin)  1 dose .XX ASDIRECTED [facility]











- Problem List & Annotations


(1) Acute respiratory failure with hypoxia


SNOMED Code(s): 26947478, [id number]


   Code(s): J96.01 - ACUTE RESPIRATORY FAILURE WITH HYPOXIA   Status: Acute   





(2) Pneumonia


SNOMED Code(s): 944485144


   Code(s): J18.9 - PNEUMONIA, UNSPECIFIED ORGANISM   Status: Acute   


Qualifiers: 


   Pneumonia type: due to unspecified organism   Laterality: unspecified 

laterality   Lung location: unspecified part of lung   Qualified Code(s): J18.9 

- Pneumonia, unspecified organism   





(3) Septic shock


SNOMED Code(s): 45656495


   Code(s): A41.9 - SEPSIS, UNSPECIFIED ORGANISM; R65.21 - SEVERE SEPSIS WITH 

SEPTIC SHOCK   Status: Acute   





- Plan


Plan:: 





I have seen and examined the patient with the resident.  I have discussed the 

findings and treatment plan with the resident.  I agree with the assessment and 

plan as outlined in the following note.

## 2020-02-22 NOTE — PN
THC Physician - Brief Progress [id number]/22/2020 08:15[facility] Lele Ferrer, [place] - CHAZN (FARZANA) - JARROD ZAMBRANOFLOWER RODRIGUEZBenDate of Service 02/22/2020 08:15HPI/Events of
 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
 shock. Overnight patient was found to be tachycardic and given IVF along with 2.5mg IV lopressor.Pat
ient seen on camera, looks slighty tachypnic on nasal canula, drinking water with assistance with bed
site team. Vitals reviewedLabs/EMR/Imaging reviewedSeptic shock- E Coli as likely source from complic
ated UTI. Recommend to D/C levaquin as Mycoplasma/legionella are both negative. Recommend D/C vancomy
huy given negative cultures x 3 days (can consider doing MRSA nares as well).  - Will recommend to di
scuss with ID regarding continuation of meropenem, as there are other susceptibilities with higher MI
C's. Since treating for sepsis 2/2 to UTI will need 10-14 days total. - Also recommend to pull back t
he central line atleast 3cm given its location in R atrium. Hypernatremia- Will recommend free water 
flushes to prevent further increase. DVT prophy- anticoagulatedGI prophy- pepcidInterventions Major-E
lectrolyte abnormality - evaluation and management, Sepsis - evaluation and managementElectronically 
Signed by: LINWOOD APPIAH) on 02/22/2020 08:33

## 2020-02-22 NOTE — PN
THC Physician - Brief Progress [id number]/22/2020 03:13
[place]Le[place], [place] [facility] FLOWER GUDate of Service 02/22/2020 03:13HPI/Events of
 Note Camera:Sinus tach 130 to 150's.MAP 65.restingBG now fine on dextrose.off of levo for PNA/sepsos
.Lopressor 2.5 mg IV once. watch for worsening hypotension. has CREST syndrome. K/Mag fine. earlier E
KG no acute changes, sinus tach.Interventions Intermediate-Arrhythmia - evaluation and managementElec
tronically Signed by: TAMIA LAND) on 02/22/2020 03:15

## 2020-02-23 RX ADMIN — LORAZEPAM PRN MG: 2 INJECTION INTRAMUSCULAR; INTRAVENOUS at 08:52

## 2020-02-23 RX ADMIN — ONDANSETRON PRN MG: 2 INJECTION, SOLUTION INTRAMUSCULAR; INTRAVENOUS at 13:09

## 2020-02-23 RX ADMIN — LORAZEPAM PRN MG: 2 INJECTION INTRAMUSCULAR; INTRAVENOUS at 19:06

## 2020-02-23 RX ADMIN — LORAZEPAM PRN MG: 2 INJECTION INTRAMUSCULAR; INTRAVENOUS at 18:18

## 2020-02-23 RX ADMIN — LORAZEPAM PRN MG: 2 INJECTION INTRAMUSCULAR; INTRAVENOUS at 16:01

## 2020-02-23 RX ADMIN — ONDANSETRON PRN MG: 2 INJECTION, SOLUTION INTRAMUSCULAR; INTRAVENOUS at 08:52

## 2020-02-23 RX ADMIN — LORAZEPAM PRN MG: 2 INJECTION INTRAMUSCULAR; INTRAVENOUS at 17:16

## 2020-02-23 RX ADMIN — LORAZEPAM PRN MG: 2 INJECTION INTRAMUSCULAR; INTRAVENOUS at 13:09

## 2020-02-23 RX ADMIN — LORAZEPAM PRN MG: 2 INJECTION INTRAMUSCULAR; INTRAVENOUS at 15:02

## 2020-02-23 NOTE — PCM.PN
- General Info


Date of Service: 02/23/20


Admission Dx/Problem (Free Text): 


 Admission Diagnosis/Problem





Admission Diagnosis/Problem      Pneumonia








Subjective Update: 


Has been comfortable overnight. Currently on palliative care per patients 

wishes , family at bedside. 





- Review of Systems


Systems Review Comment:: 





unable to obtain 





- Patient Data


Vitals - Most Recent: 


 Last Vital Signs











Temp  36.4 C   02/22/20 14:00


 


Pulse  105 H  02/22/20 14:00


 


Resp  40 H  02/22/20 14:00


 


BP  139/79   02/22/20 14:00


 


Pulse Ox  92 L  02/22/20 14:00











Weight - Most Recent: 48.852 kg


I&O - Last 24 Hours: 


 Intake & Output











 02/22/20 02/23/20 02/23/20





 22:59 06:59 14:59


 


Intake Total 1019  


 


Output Total 280  


 


Balance 739  











Jose J Results Last 24 Hours: 


 Microbiology











 02/18/20 13:33 Aerobic Blood Culture - Preliminary





 Blood - Venous - Lab Draw    NO GROWTH AFTER 4 DAYS





 Anaerobic Blood Culture - Final


 


 02/18/20 13:00 Aerobic Blood Culture - Preliminary





 Blood - Venous    NO GROWTH AFTER 4 DAYS





 Anaerobic Blood Culture - Preliminary





    NO GROWTH AFTER 4 DAYS











Med Orders - Current: 


 Current Medications





Acetaminophen (Tylenol)  650 mg PO Q4H PRN


   PRN Reason: Pain (Mild 1-3)/fever


   Last Admin: 02/19/20 14:20 Dose:  650 mg


Atropine Sulfate (Atropine 1% Ophth Soln)  0 ml SL Q1H PRN


   PRN Reason: Other


Docusate Sodium (Colace)  100 mg PO BID PRN


   PRN Reason: Constipation


Famotidine (Pepcid)  20 mg PO BID HANSA


   Last Admin: 02/23/20 09:42 Dose:  Not Given


Lorazepam (Ativan)  1 mg IVPUSH Q4H PRN


   PRN Reason: Anxiety


   Last Admin: 02/23/20 13:09 Dose:  1 mg


Morphine Sulfate (Morphine)  2 mg IVPUSH Q30M PRN


   PRN Reason: Pain


   Last Admin: 02/23/20 14:14 Dose:  2 mg


Ondansetron HCl (Zofran Odt)  4 mg PO Q4H PRN


   PRN Reason: nausea, able to take PO


Ondansetron HCl (Zofran)  4 mg IVPUSH Q4H PRN


   PRN Reason: Nausea


   Last Admin: 02/23/20 13:09 Dose:  4 mg


Polyethylene Glycol (Miralax)  17 gm PO DAILY PRN


   PRN Reason: Constipation


Promethazine HCl (Phenergan)  25 mg PO Q8H PRN


   PRN Reason: Nausea/Vomiting


   Last Admin: 02/22/20 11:00 Dose:  25 mg


Promethazine HCl (Phenergan)  12.5 mg IM Q6H PRN


   PRN Reason: Nausea


   Last Admin: 02/21/20 19:57 Dose:  12.5 mg


Sodium Chloride (Saline Flush)  10 ml FLUSH ASDIRECTED PRN


   PRN Reason: Keep Vein Open


Sodium Chloride (Saline Flush)  2.5 ml FLUSH ASDIRECTED PRN


   PRN Reason: Keep Vein Open


Sodium Chloride (Ocean Nasal Spray)  5 ml PEDRO Q4H PRN


   PRN Reason: Congestion


   Last Admin: 02/22/20 02:50 Dose:  5 ml





Discontinued Medications





Hydrocodone Bitart/Acetaminophen (Norco 325-5 Mg)  1 tab PO Q6H PRN


   PRN Reason: Pain


   Last Admin: 02/21/20 06:10 Dose:  1 tab


Albuterol/Ipratropium (Duoneb 3.0-0.5 Mg/3 Ml)  3 ml NEB ONETIME ONE


   Stop: 02/21/20 11:52


   Last Admin: 02/21/20 12:20 Dose:  3 ml


Albuterol/Ipratropium (Duoneb 3.0-0.5 Mg/3 Ml)  3 ml NEB Q4HRRT PRN


   PRN Reason: Shortness of Breath


   Last Admin: 02/22/20 14:20 Dose:  3 ml


Aspirin (Aspirin)  325 mg PO ONETIME ONE


   Stop: 02/18/20 17:23


   Last Admin: 02/18/20 17:45 Dose:  325 mg


Dextrose/Water (Dextrose 50% In Water)  50 ml IVPUSH ONETIME STA


   Stop: 02/21/20 22:03


   Last Admin: 02/21/20 22:08 Dose:  50 ml


Enoxaparin Sodium (Lovenox)  40 mg SUBCUT Q24H HANSA


   Last Admin: 02/18/20 21:02 Dose:  Not Given


Fentanyl (Sublimaze)  25 mcg IVPUSH Q4HR PRN


   PRN Reason: Severe pain


   Last Admin: 02/19/20 03:30 Dose:  25 mcg


Fentanyl (Fentanyl)  25 mcg IVPUSH ONETIME ONE


   Stop: 02/19/20 01:40


   Last Admin: 02/19/20 02:01 Dose:  Not Given


Fentanyl (Sublimaze)  25 mcg IVPUSH ONETIME ONE


   Stop: 02/19/20 02:01


   Last Admin: 02/19/20 02:01 Dose:  25 mcg


Hydrocortisone Sodium Succinate (Solu-Cortef)  100 mg IVPUSH Q8H HANSA


   Last Admin: 02/20/20 11:45 Dose:  Not Given


Hydrocortisone Sodium Succinate (Solu-Cortef)  100 mg IVPUSH ONETIME ONE


   Stop: 02/19/20 03:31


   Last Admin: 02/19/20 03:23 Dose:  100 mg


Hydrocortisone Sodium Succinate (Solu-Cortef)  100 mg IVPUSH Q12H HANSA


   Last Admin: 02/22/20 08:11 Dose:  100 mg


Sodium Chloride (Normal Saline)  1,000 mls @ 999 mls/hr IV .Bolus ONE


   Stop: 02/18/20 14:18


   Last Admin: 02/18/20 14:06 Dose:  999 mls/hr


Sodium Chloride (Normal Saline)  1,000 mls @ 999 mls/hr IV .Bolus ONE


   Stop: 02/18/20 14:30


   Last Admin: 02/18/20 14:06 Dose:  999 mls/hr


Norepinephrine Bitartrate (Norepinephr-0.9% Nacl 4 Mg/250)  4 mg in 250 mls @ 

7.5 mls/hr IV TITRATE HANSA; Protocol


   Last Titration: 02/20/20 07:45 Dose:  0 mcg/min, 0 mls/hr


Meropenem 1 gm/ Sodium (Chloride)  100 mls @ 200 mls/hr IV ONETIME ONE


   Stop: 02/18/20 15:39


   Last Admin: 02/18/20 17:30 Dose:  Not Given


Sodium Chloride (Normal Saline)  1,000 mls @ 999 mls/hr IV .Bolus ONE


   Stop: 02/18/20 17:45


   Last Admin: 02/18/20 17:07 Dose:  999 mls/hr


Levofloxacin/Dextrose 750 mg/ (Premix)  150 mls @ 100 mls/hr IV ONETIME ONE


   Stop: 02/18/20 18:36


   Last Admin: 02/18/20 17:46 Dose:  100 mls/hr


Piperacillin Sod/Tazobactam (Sod 3.375 gm/ Sodium Chloride)  50 mls @ 100 mls/

hr IV Q8H HANSA


Sodium Chloride (Normal Saline)  1,000 mls @ 125 mls/hr IV CONTINUOUS [facility]


   Last Infusion: 02/19/20 09:15 Dose:  100 mls/hr


Pantoprazole Sodium 40 mg/ (Sodium Chloride)  10 mls @ 300 mls/hr IV DAILY HANSA


   Last Admin: 02/20/20 08:56 Dose:  300 mls/hr


Sodium Chloride (Normal Saline)  1,000 mls @ 999 mls/hr IV .Bolus ONE


   Stop: 02/18/20 20:24


   Last Admin: 02/18/20 19:27 Dose:  999 mls/hr


Ertapenem 1 gm/ Sodium (Chloride)  50 mls @ 100 mls/hr IV Q24H [facility]


   Last Admin: 02/19/20 19:47 Dose:  100 mls/hr


Vancomycin HCl 1 gm/ Sodium (Chloride)  250 mls @ 166 mls/hr IV Q24H [facility]


   Last Admin: 02/19/20 20:49 Dose:  166 mls/hr


Vasopressin 100 units/ Sodium (Chloride)  100 mls @ 1.8 mls/hr IV TITRATE HANSA


Vasopressin 100 units/ Sodium (Chloride)  100 mls @ 1.8 mls/hr IV CONTINUOUS [facility]


   Last Admin: 02/18/20 22:40 Dose:  0.03 units/min, 1.8 mls/hr


Magnesium Sulfate 4 gm/ Premix  100 mls @ 50 mls/hr IV ONETIME ONE


   Stop: 02/19/20 08:37


   Last Admin: 02/19/20 06:48 Dose:  50 mls/hr


Levofloxacin/Dextrose 750 mg/ (Premix)  150 mls @ 100 mls/hr IV Q48H [facility]


Sodium Chloride (Normal Saline)  1,000 mls @ 100 mls/hr IV Q10H [facility]


   Last Admin: 02/19/20 20:56 Dose:  Not Given


Sodium Chloride (Normal Saline)  1,000 mls @ 100 mls/hr IV ASDIRECTED [facility]


   Last Admin: 02/20/20 02:18 Dose:  100 mls/hr


Ceftriaxone Sodium/Dextrose 1 (gm/ Premix)  50 mls @ 100 mls/hr IV Q24H [facility]


   Last Admin: 02/20/20 13:25 Dose:  100 mls/hr


Azithromycin 250 mg/ Sodium (Chloride)  250 mls @ 250 mls/hr IV Q24H [facility]


   Last Admin: 02/20/20 12:00 Dose:  250 mls/hr


Lactated Ringer's (Ringers, Lactated)  1,000 mls @ 75 mls/hr IV ASDIRECTED [facility]


   Last Admin: 02/21/20 06:05 Dose:  75 mls/hr


Sodium Chloride (Normal Saline)  1,000 mls @ 75 mls/hr IV ASDIRECTED [facility]


   Last Admin: 02/21/20 10:54 Dose:  75 mls/hr


Sodium Chloride (Normal Saline)  1,000 mls @ 999 mls/hr IV STAT ONE


   Stop: 02/21/20 10:14


   Last Admin: 02/21/20 10:14 Dose:  999 mls/hr


Meropenem 1 gm/ Sodium (Chloride)  50 mls @ 100 mls/hr IV Q12H [facility]


   Last Admin: 02/21/20 10:52 Dose:  Not Given


Levofloxacin/Dextrose 750 mg/ (Premix)  150 mls @ 100 mls/hr IV Q48H [facility]


   Last Admin: 02/21/20 11:44 Dose:  100 mls/hr


Meropenem/Sodium Chloride 1 gm (/ Premix)  50 mls @ 100 mls/hr IV Q12H [facility]


   Last Admin: 02/22/20 10:33 Dose:  100 mls/hr


Vancomycin HCl 0.75 gm/ Sodium (Chloride)  250 mls @ 250 mls/hr IV Q24H [facility]


   Last Admin: 02/22/20 11:56 Dose:  250 mls/hr


Dextrose/Sodium Chloride (Dextrose 5%-1/2 Ns)  1,000 mls @ 75 mls/hr IV 

ASDIRECTED [facility]


   Last Admin: 02/22/20 13:18 Dose:  75 mls/hr


Sodium Chloride (Normal Saline)  500 mls @ 999 mls/hr IV .BOLUS ONE


   Stop: 02/22/20 04:04


   Last Admin: 02/22/20 03:43 Dose:  999 mls/hr


Ibuprofen (Motrin)  800 mg PO Q6H PRN


   PRN Reason: Pain (mild 1-3)


Ketorolac Tromethamine (Toradol)  15 mg IV Q6H PRN


   PRN Reason: Pain (moderate 4-6)


Levothyroxine Sodium (Synthroid)  75 mcg PO ACBREAKFAST [facility]


Levothyroxine Sodium (Levothyroxine)  75 mcg PO ACBREAKFAST [facility]


   Last Admin: 02/22/20 06:34 Dose:  75 mcg


Lidocaine (Xylocaine-Mpf 2%) Confirm Administered Dose 5 ml .ROUTE .STK-MED ONE


   Stop: 02/20/20 09:43


   Last Admin: 02/20/20 10:35 Dose:  Not Given


Lidocaine HCl (Xylocaine 1%) Confirm Administered Dose 20 ml .ROUTE .STK-MED ONE


   Stop: 02/18/20 21:44


   Last Admin: 02/18/20 22:15 Dose:  20 ml


Megestrol Acetate (Megace)  40 mg PO DAILY [facility]


   Last Admin: 02/22/20 10:33 Dose:  40 mg


Metoprolol Tartrate (Lopressor)  2.5 mg IVPUSH ONETIME ONE


   Stop: 02/22/20 03:13


   Last Admin: 02/22/20 03:23 Dose:  2.5 mg


Midodrine (Midodrine)  10 mg PO TIDAC [facility]


   Last Admin: 02/22/20 10:58 Dose:  10 mg


Rivaroxaban (Xarelto)  10 mg PO DAILY [facility]


   Last Admin: 02/22/20 08:11 Dose:  10 mg


Tramadol HCl (Ultram)  100 mg PO Q8H PRN


   PRN Reason: Pain


   Last Admin: 02/22/20 13:42 Dose:  100 mg


Vancomycin HCl (Pharmacy To Dose - Vancomycin)  1 dose .XX ASDIRECTED [facility]


Vancomycin HCl (Pharmacy To Dose - Vancomycin)  1 dose .XX ASDIRECTED [facility]











- Exam


Quality Assessment: Supplemental Oxygen


General: Sedated


Neck: Supple, Trachea Midline


Lungs: Decreased Breath Sounds, Rales


Cardiovascular: Regular Rate, Regular Rhythm


GI/Abdominal Exam: Normal Bowel Sounds, Soft


Skin: Warm





Sepsis Event Note





- Evaluation


Sepsis Screening Result: Sepsis Risk





- Problem List & Annotations


(1) Acute respiratory failure with hypoxia


SNOMED Code(s): 92590693, 577247356


   Code(s): J96.01 - ACUTE RESPIRATORY FAILURE WITH HYPOXIA   Status: Acute   

Current Visit: Yes   





(2) Pneumonia


SNOMED Code(s): 755442513


   Code(s): J18.9 - PNEUMONIA, UNSPECIFIED ORGANISM   Status: Acute   Current 

Visit: Yes   


Qualifiers: 


   Pneumonia type: due to unspecified organism   Laterality: unspecified 

laterality   Lung location: unspecified part of lung   Qualified Code(s): J18.9 

- Pneumonia, unspecified organism   





(3) Septic shock


SNOMED Code(s): 77502448


   Code(s): A41.9 - SEPSIS, UNSPECIFIED ORGANISM; R65.21 - SEVERE SEPSIS WITH 

SEPTIC SHOCK   Status: Acute   Current Visit: Yes   





- Problem List Review


Problem List Initiated/Reviewed/Updated: Yes





- Plan


Plan:: 





A/P:








Patients family at bedside, patient has declined IV antibiotics, family wants  

patient to be purely comfort care/palliative measures as per patients wishes no 

aggressive tenement including IV antibiotics or IV fluids, she wanted comfort 

care measures.


Will cont palliative measures with Ativan and morphine.

## 2020-02-24 RX ADMIN — LORAZEPAM PRN MG: 2 INJECTION INTRAMUSCULAR; INTRAVENOUS at 06:16

## 2020-02-24 RX ADMIN — LORAZEPAM PRN MG: 2 INJECTION INTRAMUSCULAR; INTRAVENOUS at 02:12

## 2020-02-24 NOTE — [id number]
<Luis Aguilar - Last Filed: 20 14:06>





**Discharge Summary





- Hospital Course


Free Text/Narrative:: 





83 y/o female presenting to the ER with worsening shortness of breath, 

weakness. Admitted for septic shock secondary multifocal pneumonia and UTI. 

Started on Norepinephrine and started on broad spectrum antibiotics which 

included Vancomycin, Levaquin and Ertapenem. She was able to be weaned off 

pressors, however, her BP remained labile needing on and off pressors. She was 

started on Midodrine and maintenance fluids.  She required between 4-6 L O2 NC. 

Her poor medical prognosis was discussed with her and her family.  The patient 

and family decided to transition to palliative, comfort care measures due to 

worsening medical condition.  She passed away on Monday, 2020 at 7:30 am.





- Discharge Data


Discharge Date: 20


Discharge Disposition:  20


Condition: Critical





- Referral to Home Health


Primary Care Physician: 


PCP None








- Patient Summary/Data


Consults: 


 Consultations





20 12:15


Consult to Hospice [CONS] Routine 














- Discharge Plan


Home Medications: 


 Home Meds





Omeprazole Magnesium [Prilosec Otc] 20 mg PO ACBRK 14 [History]


Levothyroxine [Synthroid] 75 mcg PO ACBREAKFAST 16 [History]


Cetirizine HCl 10 mg PO BEDTIME 19 [History]


Loperamide HCl [Imodium A-D] 4 mg PO Q6H PRN 19 [History]


Oxybutynin Chloride 5 mg PO BID 19 [History]


bisacodyL [Bisacodyl] 10 mg RC DAILY PRN 19 [History]


Acetaminophen [Tylenol] 650 mg PO TID PRN 19 [History]


Docusate Sodium 100 mg PO Q12H 19 [History]


traMADol [Ultram] 100 mg PO Q8H PRN 19 [History]


Calcium Carbonate 200 mg PO DAILY PRN 19 [History]


Promethazine HCl 12.5 mg PO BID 19 [History]


Acetaminophen [Tylenol] 650 mg PO Q8H PRN  tablet 19 [Rx]


Cetirizine [ZyrTEC] 10 mg PO BEDTIME  tablet 19 [Rx]


Hydrocodone/Acetaminophen [Norco  Tablet] 1 each PO ASDIRECTED 20 [

History]


Rivaroxaban [Xarelto] 10 mg PO DAILY 20 [History]


amLODIPine Besylate [Norvasc] 10 mg PO DAILY 20 [History]


Nabumetone [Relafen Ds] 500 mg PO BID 20 [History]











- Discharge Summary/Plan Comment


DC Time >30 min.: No





- Patient Data


Vitals - Most Recent: 


 Last Vital Signs











Temp  36.4 C   20 14:00


 


Pulse  105 H  20 14:00


 


Resp  40 H  20 14:00


 


BP  139/79   20 14:00


 


Pulse Ox  92 L  20 14:00











Weight - Most Recent: 48.852 kg


I&O - Last 24 hours: 


 Intake & Output











 20





 22:59 06:59 14:59


 


Intake Total 0  


 


Balance 0  











[facility] Results - Last 24 hrs: 


 Microbiology











 20 13:33 Aerobic Blood Culture - Final





 Blood - Venous - Lab Draw    NO GROWTH AFTER 5 DAYS





 Anaerobic Blood Culture - Final


 


 20 13:00 Aerobic Blood Culture - Final





 Blood - Venous    NO GROWTH AFTER 5 DAYS





 Anaerobic Blood Culture - Final





    NO GROWTH AFTER 5 DAYS











Med Orders - Current: 


 Current Medications





Acetaminophen (Tylenol)  650 mg PO Q4H PRN


   PRN Reason: Pain (Mild 1-3)/fever


   Last Admin: 20 14:20 Dose:  650 mg


Atropine Sulfate (Atropine 1% Ophth Soln)  0 ml SL Q1H PRN


   PRN Reason: Other


Docusate Sodium (Colace)  100 mg PO BID PRN


   PRN Reason: Constipation


Famotidine (Pepcid)  20 mg PO BID [facility]


   Last Admin: 20 22:29 Dose:  Not Given


Lorazepam (Ativan)  1 mg IVPUSH Q1H PRN


   PRN Reason: Agitation


   Last Admin: 20 06:16 Dose:  1 mg


Morphine Sulfate (Morphine)  2 mg IVPUSH Q30M PRN


   PRN Reason: Pain


   Last Admin: 20 06:16 Dose:  2 mg


Ondansetron HCl (Zofran Odt)  4 mg PO Q4H PRN


   PRN Reason: nausea, able to take PO


Ondansetron HCl (Zofran)  4 mg IVPUSH Q4H PRN


   PRN Reason: Nausea


   Last Admin: 20 13:09 Dose:  4 mg


Polyethylene Glycol (Miralax)  17 gm PO DAILY PRN


   PRN Reason: Constipation


Promethazine HCl (Phenergan)  25 mg PO Q8H PRN


   PRN Reason: Nausea/Vomiting


   Last Admin: 20 11:00 Dose:  25 mg


Promethazine HCl (Phenergan)  12.5 mg IM Q6H PRN


   PRN Reason: Nausea


   Last Admin: 20 19:57 Dose:  12.5 mg


Sodium Chloride (Saline Flush)  10 ml FLUSH ASDIRECTED PRN


   PRN Reason: Keep Vein Open


Sodium Chloride (Saline Flush)  2.5 ml FLUSH ASDIRECTED PRN


   PRN Reason: Keep Vein Open


Sodium Chloride (Ocean Nasal Spray)  5 ml PEDRO Q4H PRN


   PRN Reason: Congestion


   Last Admin: 20 02:50 Dose:  5 ml





Discontinued Medications





Hydrocodone Bitart/Acetaminophen (Norco 325-5 Mg)  1 tab PO Q6H PRN


   PRN Reason: Pain


   Last Admin: 20 06:10 Dose:  1 tab


Albuterol/Ipratropium (Duoneb 3.0-0.5 Mg/3 Ml)  3 ml NEB ONETIME ONE


   Stop: 20 11:52


   Last Admin: 20 12:20 Dose:  3 ml


Albuterol/Ipratropium (Duoneb 3.0-0.5 Mg/3 Ml)  3 ml NEB Q4HRRT PRN


   PRN Reason: Shortness of Breath


   Last Admin: 20 14:20 Dose:  3 ml


Aspirin (Aspirin)  325 mg PO ONETIME ONE


   Stop: 20 17:23


   Last Admin: 20 17:45 Dose:  325 mg


Dextrose/Water (Dextrose 50% In Water)  50 ml IVPUSH ONETIME STA


   Stop: 20 22:03


   Last Admin: 20 22:08 Dose:  50 ml


Enoxaparin Sodium (Lovenox)  40 mg SUBCUT Q24H [facility]


   Last Admin: 20 21:02 Dose:  Not Given


Fentanyl (Sublimaze)  25 mcg IVPUSH Q4HR PRN


   PRN Reason: Severe pain


   Last Admin: 20 03:30 Dose:  25 mcg


Fentanyl (Fentanyl)  25 mcg IVPUSH ONETIME ONE


   Stop: 20 01:40


   Last Admin: 20 02:01 Dose:  Not Given


Fentanyl (Sublimaze)  25 mcg IVPUSH ONETIME ONE


   Stop: 20 02:01


   Last Admin: 20 02:01 Dose:  25 mcg


Hydrocortisone Sodium Succinate (Solu-Cortef)  100 mg IVPUSH Q8H [facility]


   Last Admin: 20 11:45 Dose:  Not Given


Hydrocortisone Sodium Succinate (Solu-Cortef)  100 mg IVPUSH ONETIME ONE


   Stop: 20 03:31


   Last Admin: 20 03:23 Dose:  100 mg


Hydrocortisone Sodium Succinate (Solu-Cortef)  100 mg IVPUSH Q12H [facility]


   Last Admin: 20 08:11 Dose:  100 mg


Sodium Chloride (Normal Saline)  1,000 mls @ 999 mls/hr IV .Bolus ONE


   Stop: 20 14:18


   Last Admin: 20 14:06 Dose:  999 mls/hr


Sodium Chloride (Normal Saline)  1,000 mls @ 999 mls/hr IV .Bolus ONE


   Stop: 20 14:30


   Last Admin: 20 14:06 Dose:  999 mls/hr


Norepinephrine Bitartrate (Norepinephr-0.9% Nacl 4 Mg/250)  4 mg in 250 mls @ 

7.5 mls/hr IV TITRATE [facility]; Protocol


   Last Titration: 20 07:45 Dose:  0 mcg/min, 0 mls/hr


Meropenem 1 gm/ Sodium (Chloride)  100 mls @ 200 mls/hr IV ONETIME ONE


   Stop: 20 15:39


   Last Admin: 20 17:30 Dose:  Not Given


Sodium Chloride (Normal Saline)  1,000 mls @ 999 mls/hr IV .Bolus ONE


   Stop: 20 17:45


   Last Admin: 20 17:07 Dose:  999 mls/hr


Levofloxacin/Dextrose 750 mg/ (Premix)  150 mls @ 100 mls/hr IV ONETIME ONE


   Stop: 20 18:36


   Last Admin: 20 17:46 Dose:  100 mls/hr


Piperacillin Sod/Tazobactam (Sod 3.375 gm/ Sodium Chloride)  50 mls @ 100 mls/

hr IV Q8H [facility]


Sodium Chloride (Normal Saline)  1,000 mls @ 125 mls/hr IV CONTINUOUS [facility]


   Last Infusion: 20 09:15 Dose:  100 mls/hr


Pantoprazole Sodium 40 mg/ (Sodium Chloride)  10 mls @ 300 mls/hr IV DAILY [facility]


   Last Admin: 20 08:56 Dose:  300 mls/hr


Sodium Chloride (Normal Saline)  1,000 mls @ 999 mls/hr IV .Bolus ONE


   Stop: 20 20:24


   Last Admin: 20 19:27 Dose:  999 mls/hr


Ertapenem 1 gm/ Sodium (Chloride)  50 mls @ 100 mls/hr IV Q24H [facility]


   Last Admin: 20 19:47 Dose:  100 mls/hr


Vancomycin HCl 1 gm/ Sodium (Chloride)  250 mls @ 166 mls/hr IV Q24H [facility]


   Last Admin: 20 20:49 Dose:  166 mls/hr


Vasopressin 100 units/ Sodium (Chloride)  100 mls @ 1.8 mls/hr IV TITRATE [facility]


Vasopressin 100 units/ Sodium (Chloride)  100 mls @ 1.8 mls/hr IV CONTINUOUS [facility]


   Last Admin: 20 22:40 Dose:  0.03 units/min, 1.8 mls/hr


Magnesium Sulfate 4 gm/ Premix  100 mls @ 50 mls/hr IV ONETIME ONE


   Stop: 20 08:37


   Last Admin: 20 06:48 Dose:  50 mls/hr


Levofloxacin/Dextrose 750 mg/ (Premix)  150 mls @ 100 mls/hr IV Q48H [facility]


Sodium Chloride (Normal Saline)  1,000 mls @ 100 mls/hr IV Q10H [facility]


   Last Admin: 20 20:56 Dose:  Not Given


Sodium Chloride (Normal Saline)  1,000 mls @ 100 mls/hr IV ASDIRECTED [facility]


   Last Admin: 02/20/20 02:18 Dose:  100 mls/hr


Ceftriaxone Sodium/Dextrose 1 (gm/ Premix)  50 mls @ 100 mls/hr IV Q24H [facility]


   Last Admin: 20 13:25 Dose:  100 mls/hr


Azithromycin 250 mg/ Sodium (Chloride)  250 mls @ 250 mls/hr IV Q24H [facility]


   Last Admin: 20 12:00 Dose:  250 mls/hr


Lactated Ringer's (Ringers, Lactated)  1,000 mls @ 75 mls/hr IV ASDIRECTED [facility]


   Last Admin: 20 06:05 Dose:  75 mls/hr


Sodium Chloride (Normal Saline)  1,000 mls @ 75 mls/hr IV ASDIRECTED [facility]


   Last Admin: 20 10:54 Dose:  75 mls/hr


Sodium Chloride (Normal Saline)  1,000 mls @ 999 mls/hr IV STAT ONE


   Stop: 20 10:14


   Last Admin: 20 10:14 Dose:  999 mls/hr


Meropenem 1 gm/ Sodium (Chloride)  50 mls @ 100 mls/hr IV Q12H [facility]


   Last Admin: 20 10:52 Dose:  Not Given


Levofloxacin/Dextrose 750 mg/ (Premix)  150 mls @ 100 mls/hr IV Q48H [facility]


   Last Admin: 20 11:44 Dose:  100 mls/hr


Meropenem/Sodium Chloride 1 gm (/ Premix)  50 mls @ 100 mls/hr IV Q12H [facility]


   Last Admin: 20 10:33 Dose:  100 mls/hr


Vancomycin HCl 0.75 gm/ Sodium (Chloride)  250 mls @ 250 mls/hr IV Q24H [facility]


   Last Admin: 20 11:56 Dose:  250 mls/hr


Dextrose/Sodium Chloride (Dextrose 5%-1/2 Ns)  1,000 mls @ 75 mls/hr IV 

ASDIRECTED [facility]


   Last Admin: 20 13:18 Dose:  75 mls/hr


Sodium Chloride (Normal Saline)  500 mls @ 999 mls/hr IV .BOLUS ONE


   Stop: 20 04:04


   Last Admin: 20 03:43 Dose:  999 mls/hr


Ibuprofen (Motrin)  800 mg PO Q6H PRN


   PRN Reason: Pain (mild 1-3)


Ketorolac Tromethamine (Toradol)  15 mg IV Q6H PRN


   PRN Reason: Pain (moderate 4-6)


Levothyroxine Sodium (Synthroid)  75 mcg PO ACBREAKFAST [facility]


Levothyroxine Sodium (Levothyroxine)  75 mcg PO ACBREAKFAST [facility]


   Last Admin: 20 06:34 Dose:  75 mcg


Lidocaine (Xylocaine-Mpf 2%) Confirm Administered Dose 5 ml .ROUTE .STK-MED ONE


   Stop: 20 09:43


   Last Admin: 20 10:35 Dose:  Not Given


Lidocaine HCl (Xylocaine 1%) Confirm Administered Dose 20 ml .ROUTE .STK-MED ONE


   Stop: 20 21:44


   Last Admin: 20 22:15 Dose:  20 ml


Lorazepam (Ativan)  1 mg IVPUSH Q4H PRN


   PRN Reason: Anxiety


   Last Admin: 20 13:09 Dose:  1 mg


Megestrol Acetate (Megace)  40 mg PO DAILY [facility]


   Last Admin: 20 10:33 Dose:  40 mg


Metoprolol Tartrate (Lopressor)  2.5 mg IVPUSH ONETIME ONE


   Stop: 20 03:13


   Last Admin: 20 03:23 Dose:  2.5 mg


Midodrine (Midodrine)  10 mg PO TIDAC [facility]


   Last Admin: 20 10:58 Dose:  10 mg


Rivaroxaban (Xarelto)  10 mg PO DAILY [facility]


   Last Admin: 20 08:11 Dose:  10 mg


Tramadol HCl (Ultram)  100 mg PO Q8H PRN


   PRN Reason: Pain


   Last Admin: 20 13:42 Dose:  100 mg


Vancomycin HCl (Pharmacy To Dose - Vancomycin)  1 dose .XX ASDIRECTED [facility]


Vancomycin HCl (Pharmacy To Dose - Vancomycin)  1 dose .XX ASDIRECTED [facility]











<Amparo Mayo - Last Filed: 20 11:44>





**Discharge Summary





- Hospital Course


Free Text/Narrative:: 





I have seen and examined the patient with the resident.  I have discussed the 

findings and treatment plan with the resident.  I agree with the assessment and 

plan as outlined in the following note.








- Referral to Home Health


Primary Care Physician: 


PCP None








- Discharge Diagnosis/Problem(s)


(1) Acute respiratory failure with hypoxia


SNOMED Code(s): 37414515, [id number]


   ICD Code: J96.01 - ACUTE RESPIRATORY FAILURE WITH HYPOXIA   Status: Acute   





(2) Pneumonia


SNOMED Code(s): 983111358


   ICD Code: J18.9 - PNEUMONIA, UNSPECIFIED ORGANISM   Status: Acute   


Qualifiers: 


   Pneumonia type: due to unspecified organism   Laterality: unspecified 

laterality   Lung location: unspecified part of lung   Qualified Code(s): J18.9 

- Pneumonia, unspecified organism   





(3) Septic shock


SNOMED Code(s): 47835317


   ICD Code: A41.9 - SEPSIS, UNSPECIFIED ORGANISM; R65.21 - SEVERE SEPSIS WITH 

SEPTIC SHOCK   Status: Acute   





- Patient Summary/Data


Consults: 


 Consultations





20 12:15


Consult to Hospice [CONS] Routine 














- Patient Data


Vitals - Most Recent: 


 Last Vital Signs











Temp  36.4 C   20 14:00


 


Pulse  105 H  20 14:00


 


Resp  40 H  20 14:00


 


BP  139/79   20 14:00


 


Pulse Ox  92 L  20 14:00











Med Orders - Current: 


 Current Medications








Discontinued Medications





Acetaminophen (Tylenol)  650 mg PO Q4H PRN


   PRN Reason: Pain (Mild 1-3)/fever


   Last Admin: 20 14:20 Dose:  650 mg


Hydrocodone Bitart/Acetaminophen (Norco 325-5 Mg)  1 tab PO Q6H PRN


   PRN Reason: Pain


   Last Admin: 20 06:10 Dose:  1 tab


Albuterol/Ipratropium (Duoneb 3.0-0.5 Mg/3 Ml)  3 ml NEB ONETIME ONE


   Stop: 20 11:52


   Last Admin: 20 12:20 Dose:  3 ml


Albuterol/Ipratropium (Duoneb 3.0-0.5 Mg/3 Ml)  3 ml NEB Q4HRRT PRN


   PRN Reason: Shortness of Breath


   Last Admin: 20 14:20 Dose:  3 ml


Aspirin (Aspirin)  325 mg PO ONETIME ONE


   Stop: 20 17:23


   Last Admin: 20 17:45 Dose:  325 mg


Atropine Sulfate (Atropine 1% Ophth Soln)  0 ml SL Q1H PRN


   PRN Reason: Other


Dextrose/Water (Dextrose 50% In Water)  50 ml IVPUSH ONETIME STA


   Stop: 20 22:03


   Last Admin: 20 22:08 Dose:  50 ml


Docusate Sodium (Colace)  100 mg PO BID PRN


   PRN Reason: Constipation


Enoxaparin Sodium (Lovenox)  40 mg SUBCUT Q24H [facility]


   Last Admin: 20 21:02 Dose:  Not Given


Famotidine (Pepcid)  20 mg PO BID [facility]


   Last Admin: 20 11:05 Dose:  Not Given


Fentanyl (Sublimaze)  25 mcg IVPUSH Q4HR PRN


   PRN Reason: Severe pain


   Last Admin: 20 03:30 Dose:  25 mcg


Fentanyl (Fentanyl)  25 mcg IVPUSH ONETIME ONE


   Stop: 20 01:40


   Last Admin: 20 02:01 Dose:  Not Given


Fentanyl (Sublimaze)  25 mcg IVPUSH ONETIME ONE


   Stop: 20 02:01


   Last Admin: 20 02:01 Dose:  25 mcg


Hydrocortisone Sodium Succinate (Solu-Cortef)  100 mg IVPUSH Q8H [facility]


   Last Admin: 20 11:45 Dose:  Not Given


Hydrocortisone Sodium Succinate (Solu-Cortef)  100 mg IVPUSH ONETIME ONE


   Stop: 20 03:31


   Last Admin: 20 03:23 Dose:  100 mg


Hydrocortisone Sodium Succinate (Solu-Cortef)  100 mg IVPUSH Q12H [facility]


   Last Admin: 20 08:11 Dose:  100 mg


Sodium Chloride (Normal Saline)  1,000 mls @ 999 mls/hr IV .Bolus ONE


   Stop: 20 14:18


   Last Admin: 20 14:06 Dose:  999 mls/hr


Sodium Chloride (Normal Saline)  1,000 mls @ 999 mls/hr IV .Bolus ONE


   Stop: 20 14:30


   Last Admin: 20 14:06 Dose:  999 mls/hr


Norepinephrine Bitartrate (Norepinephr-0.9% Nacl 4 Mg/250)  4 mg in 250 mls @ 

7.5 mls/hr IV TITRATE [facility]; Protocol


   Last Titration: 20 07:45 Dose:  0 mcg/min, 0 mls/hr


Meropenem 1 gm/ Sodium (Chloride)  100 mls @ 200 mls/hr IV ONETIME ONE


   Stop: 20 15:39


   Last Admin: 20 17:30 Dose:  Not Given


Sodium Chloride (Normal Saline)  1,000 mls @ 999 mls/hr IV .Bolus ONE


   Stop: 20 17:45


   Last Admin: 20 17:07 Dose:  999 mls/hr


Levofloxacin/Dextrose 750 mg/ (Premix)  150 mls @ 100 mls/hr IV ONETIME ONE


   Stop: 20 18:36


   Last Admin: 20 17:46 Dose:  100 mls/hr


Piperacillin Sod/Tazobactam (Sod 3.375 gm/ Sodium Chloride)  50 mls @ 100 mls/

hr IV Q8H [facility]


Sodium Chloride (Normal Saline)  1,000 mls @ 125 mls/hr IV CONTINUOUS [facility]


   Last Infusion: 20 09:15 Dose:  100 mls/hr


Pantoprazole Sodium 40 mg/ (Sodium Chloride)  10 mls @ 300 mls/hr IV DAILY [facility]


   Last Admin: 20 08:56 Dose:  300 mls/hr


Sodium Chloride (Normal Saline)  1,000 mls @ 999 mls/hr IV .Bolus ONE


   Stop: 20 20:24


   Last Admin: 20 19:27 Dose:  999 mls/hr


Ertapenem 1 gm/ Sodium (Chloride)  50 mls @ 100 mls/hr IV Q24H [facility]


   Last Admin: 20 19:47 Dose:  100 mls/hr


Vancomycin HCl 1 gm/ Sodium (Chloride)  250 mls @ 166 mls/hr IV Q24H [facility]


   Last Admin: 20 20:49 Dose:  166 mls/hr


Vasopressin 100 units/ Sodium (Chloride)  100 mls @ 1.8 mls/hr IV TITRATE [facility]


Vasopressin 100 units/ Sodium (Chloride)  100 mls @ 1.8 mls/hr IV CONTINUOUS [facility]


   Last Admin: 20 22:40 Dose:  0.03 units/min, 1.8 mls/hr


Magnesium Sulfate 4 gm/ Premix  100 mls @ 50 mls/hr IV ONETIME ONE


   Stop: 20 08:37


   Last Admin: 20 06:48 Dose:  50 mls/hr


Levofloxacin/Dextrose 750 mg/ (Premix)  150 mls @ 100 mls/hr IV Q48H [facility]


Sodium Chloride (Normal Saline)  1,000 mls @ 100 mls/hr IV Q10H [facility]


   Last Admin: 20 20:56 Dose:  Not Given


Sodium Chloride (Normal Saline)  1,000 mls @ 100 mls/hr IV ASDIRECTED [facility]


   Last Admin: 20 02:18 Dose:  100 mls/hr


Ceftriaxone Sodium/Dextrose 1 (gm/ Premix)  50 mls @ 100 mls/hr IV Q24H [facility]


   Last Admin: 20 13:25 Dose:  100 mls/hr


Azithromycin 250 mg/ Sodium (Chloride)  250 mls @ 250 mls/hr IV Q24H [facility]


   Last Admin: 20 12:00 Dose:  250 mls/hr


Lactated Ringer's (Ringers, Lactated)  1,000 mls @ 75 mls/hr IV ASDIRECTED [facility]


   Last Admin: 20 06:05 Dose:  75 mls/hr


Sodium Chloride (Normal Saline)  1,000 mls @ 75 mls/hr IV ASDIRECTED [facility]


   Last Admin: 20 10:54 Dose:  75 mls/hr


Sodium Chloride (Normal Saline)  1,000 mls @ 999 mls/hr IV STAT ONE


   Stop: 20 10:14


   Last Admin: 20 10:14 Dose:  999 mls/hr


Meropenem 1 gm/ Sodium (Chloride)  50 mls @ 100 mls/hr IV Q12H [facility]


   Last Admin: 20 10:52 Dose:  Not Given


Levofloxacin/Dextrose 750 mg/ (Premix)  150 mls @ 100 mls/hr IV Q48H [facility]


   Last Admin: 20 11:44 Dose:  100 mls/hr


Meropenem/Sodium Chloride 1 gm (/ Premix)  50 mls @ 100 mls/hr IV Q12H [facility]


   Last Admin: 20 10:33 Dose:  100 mls/hr


Vancomycin HCl 0.75 gm/ Sodium (Chloride)  250 mls @ 250 mls/hr IV Q24H [facility]


   Last Admin: 20 11:56 Dose:  250 mls/hr


Dextrose/Sodium Chloride (Dextrose 5%-1/2 Ns)  1,000 mls @ 75 mls/hr IV 

ASDIRECTED [facility]


   Last Admin: 20 13:18 Dose:  75 mls/hr


Sodium Chloride (Normal Saline)  500 mls @ 999 mls/hr IV .BOLUS ONE


   Stop: 20 04:04


   Last Admin: 20 03:43 Dose:  999 mls/hr


Ibuprofen (Motrin)  800 mg PO Q6H PRN


   PRN Reason: Pain (mild 1-3)


Ketorolac Tromethamine (Toradol)  15 mg IV Q6H PRN


   PRN Reason: Pain (moderate 4-6)


Levothyroxine Sodium (Synthroid)  75 mcg PO ACBREAKFAST [facility]


Levothyroxine Sodium (Levothyroxine)  75 mcg PO ACBREAKFAST [facility]


   Last Admin: 20 06:34 Dose:  75 mcg


Lidocaine (Xylocaine-Mpf 2%) Confirm Administered Dose 5 ml .ROUTE .STK-MED ONE


   Stop: 20 09:43


   Last Admin: 20 10:35 Dose:  Not Given


Lidocaine HCl (Xylocaine 1%) Confirm Administered Dose 20 ml .ROUTE .STK-MED ONE


   Stop: 20 21:44


   Last Admin: 20 22:15 Dose:  20 ml


Lorazepam (Ativan)  1 mg IVPUSH Q4H PRN


   PRN Reason: Anxiety


   Last Admin: 20 13:09 Dose:  1 mg


Lorazepam (Ativan)  1 mg IVPUSH Q1H PRN


   PRN Reason: Agitation


   Last Admin: 20 06:16 Dose:  1 mg


Megestrol Acetate (Megace)  40 mg PO DAILY [facility]


   Last Admin: 20 10:33 Dose:  40 mg


Metoprolol Tartrate (Lopressor)  2.5 mg IVPUSH ONETIME ONE


   Stop: 20 03:13


   Last Admin: 20 03:23 Dose:  2.5 mg


Midodrine (Midodrine)  10 mg PO TIDAC [facility]


   Last Admin: 20 10:58 Dose:  10 mg


Morphine Sulfate (Morphine)  2 mg IVPUSH Q30M PRN


   PRN Reason: Pain


   Last Admin: 20 06:16 Dose:  2 mg


Ondansetron HCl (Zofran Odt)  4 mg PO Q4H PRN


   PRN Reason: nausea, able to take PO


Ondansetron HCl (Zofran)  4 mg IVPUSH Q4H PRN


   PRN Reason: Nausea


   Last Admin: 20 13:09 Dose:  4 mg


Polyethylene Glycol (Miralax)  17 gm PO DAILY PRN


   PRN Reason: Constipation


Promethazine HCl (Phenergan)  25 mg PO Q8H PRN


   PRN Reason: Nausea/Vomiting


   Last Admin: 20 11:00 Dose:  25 mg


Promethazine HCl (Phenergan)  12.5 mg IM Q6H PRN


   PRN Reason: Nausea


   Last Admin: 20 19:57 Dose:  12.5 mg


Rivaroxaban (Xarelto)  10 mg PO DAILY [facility]


   Last Admin: 20 08:11 Dose:  10 mg


Sodium Chloride (Saline Flush)  10 ml FLUSH ASDIRECTED PRN


   PRN Reason: Keep Vein Open


Sodium Chloride (Saline Flush)  2.5 ml FLUSH ASDIRECTED PRN


   PRN Reason: Keep Vein Open


Sodium Chloride (Ocean Nasal Spray)  5 ml PEDRO Q4H PRN


   PRN Reason: Congestion


   Last Admin: 20 02:50 Dose:  5 ml


Tramadol HCl (Ultram)  100 mg PO Q8H PRN


   PRN Reason: Pain


   Last Admin: 20 13:42 Dose:  100 mg


Vancomycin HCl (Pharmacy To Dose - Vancomycin)  1 dose .XX ASDIRECTED [facility]


Vancomycin HCl (Pharmacy To Dose - Vancomycin)  1 dose .XX ASDIRECTED [facility]